# Patient Record
Sex: MALE | Race: WHITE | NOT HISPANIC OR LATINO
[De-identification: names, ages, dates, MRNs, and addresses within clinical notes are randomized per-mention and may not be internally consistent; named-entity substitution may affect disease eponyms.]

---

## 2017-01-04 ENCOUNTER — CHART COPY (OUTPATIENT)
Age: 67
End: 2017-01-04

## 2017-01-08 ENCOUNTER — FORM ENCOUNTER (OUTPATIENT)
Age: 67
End: 2017-01-08

## 2017-01-09 ENCOUNTER — OUTPATIENT (OUTPATIENT)
Dept: OUTPATIENT SERVICES | Facility: HOSPITAL | Age: 67
LOS: 1 days | End: 2017-01-09
Payer: MEDICARE

## 2017-01-09 ENCOUNTER — APPOINTMENT (OUTPATIENT)
Dept: CT IMAGING | Facility: HOSPITAL | Age: 67
End: 2017-01-09

## 2017-01-09 DIAGNOSIS — R91.1 SOLITARY PULMONARY NODULE: ICD-10-CM

## 2017-01-09 DIAGNOSIS — C18.9 MALIGNANT NEOPLASM OF COLON, UNSPECIFIED: ICD-10-CM

## 2017-01-09 DIAGNOSIS — C18.9 MALIGNANT NEOPLASM OF COLON, UNSPECIFIED: Chronic | ICD-10-CM

## 2017-01-09 DIAGNOSIS — Z98.89 OTHER SPECIFIED POSTPROCEDURAL STATES: Chronic | ICD-10-CM

## 2017-01-09 DIAGNOSIS — Z90.49 ACQUIRED ABSENCE OF OTHER SPECIFIED PARTS OF DIGESTIVE TRACT: Chronic | ICD-10-CM

## 2017-01-09 PROCEDURE — 71260 CT THORAX DX C+: CPT

## 2017-01-18 ENCOUNTER — APPOINTMENT (OUTPATIENT)
Dept: THORACIC SURGERY | Facility: CLINIC | Age: 67
End: 2017-01-18

## 2017-01-19 ENCOUNTER — OUTPATIENT (OUTPATIENT)
Dept: OUTPATIENT SERVICES | Facility: HOSPITAL | Age: 67
LOS: 1 days | End: 2017-01-19
Payer: MEDICARE

## 2017-01-19 ENCOUNTER — APPOINTMENT (OUTPATIENT)
Dept: NUCLEAR MEDICINE | Facility: CLINIC | Age: 67
End: 2017-01-19

## 2017-01-19 DIAGNOSIS — Z90.49 ACQUIRED ABSENCE OF OTHER SPECIFIED PARTS OF DIGESTIVE TRACT: Chronic | ICD-10-CM

## 2017-01-19 DIAGNOSIS — Z98.89 OTHER SPECIFIED POSTPROCEDURAL STATES: Chronic | ICD-10-CM

## 2017-01-19 DIAGNOSIS — Z00.8 ENCOUNTER FOR OTHER GENERAL EXAMINATION: ICD-10-CM

## 2017-01-19 DIAGNOSIS — C18.9 MALIGNANT NEOPLASM OF COLON, UNSPECIFIED: Chronic | ICD-10-CM

## 2017-01-19 PROCEDURE — 78815 PET IMAGE W/CT SKULL-THIGH: CPT | Mod: 26,PI

## 2017-01-19 PROCEDURE — 78815 PET IMAGE W/CT SKULL-THIGH: CPT

## 2017-01-19 PROCEDURE — A9552: CPT

## 2017-01-30 ENCOUNTER — APPOINTMENT (OUTPATIENT)
Dept: CARDIOLOGY | Facility: CLINIC | Age: 67
End: 2017-01-30

## 2017-02-06 ENCOUNTER — APPOINTMENT (OUTPATIENT)
Dept: CARDIOLOGY | Facility: CLINIC | Age: 67
End: 2017-02-06

## 2017-02-06 VITALS
SYSTOLIC BLOOD PRESSURE: 180 MMHG | BODY MASS INDEX: 22.33 KG/M2 | RESPIRATION RATE: 17 BRPM | HEIGHT: 65 IN | WEIGHT: 134 LBS | HEART RATE: 66 BPM | OXYGEN SATURATION: 95 % | DIASTOLIC BLOOD PRESSURE: 73 MMHG

## 2017-02-08 ENCOUNTER — APPOINTMENT (OUTPATIENT)
Dept: CARDIOLOGY | Facility: CLINIC | Age: 67
End: 2017-02-08

## 2017-03-06 ENCOUNTER — APPOINTMENT (OUTPATIENT)
Dept: HEMATOLOGY ONCOLOGY | Facility: CLINIC | Age: 67
End: 2017-03-06

## 2017-03-06 ENCOUNTER — OUTPATIENT (OUTPATIENT)
Dept: OUTPATIENT SERVICES | Facility: HOSPITAL | Age: 67
LOS: 1 days | End: 2017-03-06
Payer: MEDICARE

## 2017-03-06 ENCOUNTER — APPOINTMENT (OUTPATIENT)
Age: 67
End: 2017-03-06

## 2017-03-06 VITALS
BODY MASS INDEX: 22.8 KG/M2 | SYSTOLIC BLOOD PRESSURE: 124 MMHG | OXYGEN SATURATION: 97 % | WEIGHT: 137 LBS | HEART RATE: 58 BPM | TEMPERATURE: 97.5 F | DIASTOLIC BLOOD PRESSURE: 54 MMHG

## 2017-03-06 DIAGNOSIS — Z98.89 OTHER SPECIFIED POSTPROCEDURAL STATES: Chronic | ICD-10-CM

## 2017-03-06 DIAGNOSIS — Z90.49 ACQUIRED ABSENCE OF OTHER SPECIFIED PARTS OF DIGESTIVE TRACT: Chronic | ICD-10-CM

## 2017-03-06 DIAGNOSIS — C18.9 MALIGNANT NEOPLASM OF COLON, UNSPECIFIED: Chronic | ICD-10-CM

## 2017-03-06 DIAGNOSIS — C34.90 MALIGNANT NEOPLASM OF UNSPECIFIED PART OF UNSPECIFIED BRONCHUS OR LUNG: ICD-10-CM

## 2017-03-06 PROCEDURE — 96523 IRRIG DRUG DELIVERY DEVICE: CPT

## 2017-03-06 RX ORDER — ACETAMINOPHEN AND CODEINE 300; 30 MG/1; MG/1
300-30 TABLET ORAL
Qty: 30 | Refills: 0 | Status: COMPLETED | COMMUNITY
Start: 2017-02-14

## 2017-03-06 RX ORDER — PRAMOXINE HYDROCHLORIDE AND HYDROCORTISONE ACETATE 10; 25 MG/G; MG/G
2.5-1 CREAM TOPICAL
Qty: 30 | Refills: 0 | Status: COMPLETED | COMMUNITY
Start: 2017-01-13

## 2017-04-05 ENCOUNTER — RX RENEWAL (OUTPATIENT)
Age: 67
End: 2017-04-05

## 2017-04-12 ENCOUNTER — APPOINTMENT (OUTPATIENT)
Dept: MRI IMAGING | Facility: HOSPITAL | Age: 67
End: 2017-04-12

## 2017-04-12 ENCOUNTER — OUTPATIENT (OUTPATIENT)
Dept: OUTPATIENT SERVICES | Facility: HOSPITAL | Age: 67
LOS: 1 days | End: 2017-04-12
Payer: MEDICARE

## 2017-04-12 DIAGNOSIS — C18.9 MALIGNANT NEOPLASM OF COLON, UNSPECIFIED: Chronic | ICD-10-CM

## 2017-04-12 DIAGNOSIS — M51.26 OTHER INTERVERTEBRAL DISC DISPLACEMENT, LUMBAR REGION: ICD-10-CM

## 2017-04-12 DIAGNOSIS — Z90.49 ACQUIRED ABSENCE OF OTHER SPECIFIED PARTS OF DIGESTIVE TRACT: Chronic | ICD-10-CM

## 2017-04-12 DIAGNOSIS — Z98.89 OTHER SPECIFIED POSTPROCEDURAL STATES: Chronic | ICD-10-CM

## 2017-04-12 DIAGNOSIS — M51.37 OTHER INTERVERTEBRAL DISC DEGENERATION, LUMBOSACRAL REGION: ICD-10-CM

## 2017-04-12 PROCEDURE — 72158 MRI LUMBAR SPINE W/O & W/DYE: CPT

## 2017-05-13 ENCOUNTER — LABORATORY RESULT (OUTPATIENT)
Age: 67
End: 2017-05-13

## 2017-05-15 ENCOUNTER — APPOINTMENT (OUTPATIENT)
Dept: INFUSION THERAPY | Facility: HOSPITAL | Age: 67
End: 2017-05-15

## 2017-05-15 ENCOUNTER — OUTPATIENT (OUTPATIENT)
Dept: OUTPATIENT SERVICES | Facility: HOSPITAL | Age: 67
LOS: 1 days | End: 2017-05-15
Payer: MEDICARE

## 2017-05-15 ENCOUNTER — APPOINTMENT (OUTPATIENT)
Dept: HEMATOLOGY ONCOLOGY | Facility: CLINIC | Age: 67
End: 2017-05-15

## 2017-05-15 VITALS
DIASTOLIC BLOOD PRESSURE: 60 MMHG | BODY MASS INDEX: 22.99 KG/M2 | HEART RATE: 60 BPM | HEIGHT: 65 IN | WEIGHT: 138 LBS | RESPIRATION RATE: 16 BRPM | TEMPERATURE: 98.2 F | SYSTOLIC BLOOD PRESSURE: 148 MMHG

## 2017-05-15 DIAGNOSIS — C34.90 MALIGNANT NEOPLASM OF UNSPECIFIED PART OF UNSPECIFIED BRONCHUS OR LUNG: ICD-10-CM

## 2017-05-15 DIAGNOSIS — Z90.49 ACQUIRED ABSENCE OF OTHER SPECIFIED PARTS OF DIGESTIVE TRACT: Chronic | ICD-10-CM

## 2017-05-15 DIAGNOSIS — Z98.89 OTHER SPECIFIED POSTPROCEDURAL STATES: Chronic | ICD-10-CM

## 2017-05-15 DIAGNOSIS — C18.9 MALIGNANT NEOPLASM OF COLON, UNSPECIFIED: Chronic | ICD-10-CM

## 2017-05-15 PROCEDURE — 96523 IRRIG DRUG DELIVERY DEVICE: CPT

## 2017-06-01 ENCOUNTER — FORM ENCOUNTER (OUTPATIENT)
Age: 67
End: 2017-06-01

## 2017-06-02 ENCOUNTER — APPOINTMENT (OUTPATIENT)
Dept: CT IMAGING | Facility: HOSPITAL | Age: 67
End: 2017-06-02

## 2017-06-02 ENCOUNTER — APPOINTMENT (OUTPATIENT)
Dept: ULTRASOUND IMAGING | Facility: HOSPITAL | Age: 67
End: 2017-06-02

## 2017-06-02 ENCOUNTER — OUTPATIENT (OUTPATIENT)
Dept: OUTPATIENT SERVICES | Facility: HOSPITAL | Age: 67
LOS: 1 days | End: 2017-06-02
Payer: MEDICARE

## 2017-06-02 DIAGNOSIS — Z98.89 OTHER SPECIFIED POSTPROCEDURAL STATES: Chronic | ICD-10-CM

## 2017-06-02 DIAGNOSIS — C18.9 MALIGNANT NEOPLASM OF COLON, UNSPECIFIED: Chronic | ICD-10-CM

## 2017-06-02 DIAGNOSIS — K76.9 LIVER DISEASE, UNSPECIFIED: ICD-10-CM

## 2017-06-02 DIAGNOSIS — Z90.49 ACQUIRED ABSENCE OF OTHER SPECIFIED PARTS OF DIGESTIVE TRACT: Chronic | ICD-10-CM

## 2017-06-02 PROCEDURE — 71260 CT THORAX DX C+: CPT

## 2017-06-02 PROCEDURE — 76700 US EXAM ABDOM COMPLETE: CPT

## 2017-06-27 ENCOUNTER — OUTPATIENT (OUTPATIENT)
Dept: OUTPATIENT SERVICES | Facility: HOSPITAL | Age: 67
LOS: 1 days | End: 2017-06-27
Payer: MEDICARE

## 2017-06-27 ENCOUNTER — APPOINTMENT (OUTPATIENT)
Dept: INFUSION THERAPY | Facility: HOSPITAL | Age: 67
End: 2017-06-27

## 2017-06-27 ENCOUNTER — APPOINTMENT (OUTPATIENT)
Dept: HEMATOLOGY ONCOLOGY | Facility: CLINIC | Age: 67
End: 2017-06-27

## 2017-06-27 VITALS
DIASTOLIC BLOOD PRESSURE: 60 MMHG | TEMPERATURE: 98.4 F | HEART RATE: 68 BPM | BODY MASS INDEX: 22.8 KG/M2 | WEIGHT: 137 LBS | SYSTOLIC BLOOD PRESSURE: 122 MMHG

## 2017-06-27 DIAGNOSIS — C34.90 MALIGNANT NEOPLASM OF UNSPECIFIED PART OF UNSPECIFIED BRONCHUS OR LUNG: ICD-10-CM

## 2017-06-27 DIAGNOSIS — E55.9 VITAMIN D DEFICIENCY, UNSPECIFIED: ICD-10-CM

## 2017-06-27 DIAGNOSIS — C18.9 MALIGNANT NEOPLASM OF COLON, UNSPECIFIED: Chronic | ICD-10-CM

## 2017-06-27 DIAGNOSIS — Z98.89 OTHER SPECIFIED POSTPROCEDURAL STATES: Chronic | ICD-10-CM

## 2017-06-27 DIAGNOSIS — Z90.49 ACQUIRED ABSENCE OF OTHER SPECIFIED PARTS OF DIGESTIVE TRACT: Chronic | ICD-10-CM

## 2017-06-27 PROCEDURE — 96523 IRRIG DRUG DELIVERY DEVICE: CPT

## 2017-09-08 ENCOUNTER — OUTPATIENT (OUTPATIENT)
Dept: OUTPATIENT SERVICES | Facility: HOSPITAL | Age: 67
LOS: 1 days | End: 2017-09-08
Payer: MEDICARE

## 2017-09-08 ENCOUNTER — APPOINTMENT (OUTPATIENT)
Dept: INFUSION THERAPY | Facility: HOSPITAL | Age: 67
End: 2017-09-08

## 2017-09-08 DIAGNOSIS — Z90.49 ACQUIRED ABSENCE OF OTHER SPECIFIED PARTS OF DIGESTIVE TRACT: Chronic | ICD-10-CM

## 2017-09-08 DIAGNOSIS — Z98.89 OTHER SPECIFIED POSTPROCEDURAL STATES: Chronic | ICD-10-CM

## 2017-09-08 DIAGNOSIS — C34.90 MALIGNANT NEOPLASM OF UNSPECIFIED PART OF UNSPECIFIED BRONCHUS OR LUNG: ICD-10-CM

## 2017-09-08 DIAGNOSIS — C18.9 MALIGNANT NEOPLASM OF COLON, UNSPECIFIED: Chronic | ICD-10-CM

## 2017-09-08 PROCEDURE — 96523 IRRIG DRUG DELIVERY DEVICE: CPT

## 2017-10-07 ENCOUNTER — LABORATORY RESULT (OUTPATIENT)
Age: 67
End: 2017-10-07

## 2017-10-27 ENCOUNTER — APPOINTMENT (OUTPATIENT)
Dept: INFUSION THERAPY | Facility: HOSPITAL | Age: 67
End: 2017-10-27

## 2017-10-27 ENCOUNTER — OUTPATIENT (OUTPATIENT)
Dept: OUTPATIENT SERVICES | Facility: HOSPITAL | Age: 67
LOS: 1 days | End: 2017-10-27
Payer: MEDICARE

## 2017-10-27 DIAGNOSIS — Z98.89 OTHER SPECIFIED POSTPROCEDURAL STATES: Chronic | ICD-10-CM

## 2017-10-27 DIAGNOSIS — C18.9 MALIGNANT NEOPLASM OF COLON, UNSPECIFIED: Chronic | ICD-10-CM

## 2017-10-27 DIAGNOSIS — C34.90 MALIGNANT NEOPLASM OF UNSPECIFIED PART OF UNSPECIFIED BRONCHUS OR LUNG: ICD-10-CM

## 2017-10-27 DIAGNOSIS — Z90.49 ACQUIRED ABSENCE OF OTHER SPECIFIED PARTS OF DIGESTIVE TRACT: Chronic | ICD-10-CM

## 2017-11-09 ENCOUNTER — APPOINTMENT (OUTPATIENT)
Dept: HEMATOLOGY ONCOLOGY | Facility: CLINIC | Age: 67
End: 2017-11-09
Payer: MEDICARE

## 2017-11-09 VITALS
WEIGHT: 140 LBS | SYSTOLIC BLOOD PRESSURE: 140 MMHG | BODY MASS INDEX: 23.3 KG/M2 | HEART RATE: 68 BPM | DIASTOLIC BLOOD PRESSURE: 60 MMHG | TEMPERATURE: 97.8 F

## 2017-11-09 PROCEDURE — 96523 IRRIG DRUG DELIVERY DEVICE: CPT

## 2017-11-09 PROCEDURE — 99215 OFFICE O/P EST HI 40 MIN: CPT

## 2018-01-03 ENCOUNTER — APPOINTMENT (OUTPATIENT)
Dept: FAMILY MEDICINE | Facility: CLINIC | Age: 68
End: 2018-01-03
Payer: MEDICARE

## 2018-01-03 VITALS
DIASTOLIC BLOOD PRESSURE: 68 MMHG | WEIGHT: 138 LBS | SYSTOLIC BLOOD PRESSURE: 170 MMHG | OXYGEN SATURATION: 98 % | HEART RATE: 75 BPM | HEIGHT: 64 IN | BODY MASS INDEX: 23.56 KG/M2

## 2018-01-03 VITALS — SYSTOLIC BLOOD PRESSURE: 140 MMHG | DIASTOLIC BLOOD PRESSURE: 70 MMHG

## 2018-01-03 VITALS — RESPIRATION RATE: 16 BRPM

## 2018-01-03 PROCEDURE — 99214 OFFICE O/P EST MOD 30 MIN: CPT | Mod: 25

## 2018-01-03 PROCEDURE — 36415 COLL VENOUS BLD VENIPUNCTURE: CPT

## 2018-01-04 ENCOUNTER — APPOINTMENT (OUTPATIENT)
Dept: INFUSION THERAPY | Facility: HOSPITAL | Age: 68
End: 2018-01-04

## 2018-01-04 LAB
24R-OH-CALCIDIOL SERPL-MCNC: 62.3 PG/ML
25(OH)D3 SERPL-MCNC: 33.2 NG/ML
ALBUMIN SERPL ELPH-MCNC: 4.4 G/DL
ALP BLD-CCNC: 76 U/L
ALT SERPL-CCNC: 21 U/L
ANION GAP SERPL CALC-SCNC: 16 MMOL/L
AST SERPL-CCNC: 26 U/L
BASOPHILS # BLD AUTO: 0.01 K/UL
BASOPHILS NFR BLD AUTO: 0.1 %
BILIRUB SERPL-MCNC: 0.3 MG/DL
BUN SERPL-MCNC: 19 MG/DL
CALCIUM SERPL-MCNC: 9.7 MG/DL
CEA SERPL-MCNC: 1.4 NG/ML
CHLORIDE SERPL-SCNC: 103 MMOL/L
CHOLEST SERPL-MCNC: 154 MG/DL
CHOLEST/HDLC SERPL: 2.2 RATIO
CO2 SERPL-SCNC: 24 MMOL/L
CREAT SERPL-MCNC: 0.77 MG/DL
EOSINOPHIL # BLD AUTO: 0.26 K/UL
EOSINOPHIL NFR BLD AUTO: 3.3
GLUCOSE SERPL-MCNC: 85 MG/DL
HBA1C MFR BLD HPLC: 5.6 %
HCT VFR BLD CALC: 42.9 %
HDLC SERPL-MCNC: 71 MG/DL
HGB BLD-MCNC: 13.9 G/DL
IMM GRANULOCYTES NFR BLD AUTO: 0.3 %
LDLC SERPL CALC-MCNC: 61 MG/DL
LYMPHOCYTES # BLD AUTO: 2.22 K/UL
LYMPHOCYTES NFR BLD AUTO: 28.2 %
MAN DIFF?: NORMAL
MCHC RBC-ENTMCNC: 31.7 PG
MCHC RBC-ENTMCNC: 32.4 GM/DL
MCV RBC AUTO: 97.9 FL
MONOCYTES # BLD AUTO: 0.83 K/UL
MONOCYTES NFR BLD AUTO: 10.5 %
NEUTROPHILS # BLD AUTO: 4.54 K/UL
NEUTROPHILS NFR BLD AUTO: 57.6 %
PLATELET # BLD AUTO: 209 K/UL
POTASSIUM SERPL-SCNC: 4.1 MMOL/L
PROT SERPL-MCNC: 7.5 G/DL
PSA SERPL-MCNC: 0.34 NG/ML
RBC # BLD: 4.38 M/UL
RBC # FLD: 13.5 %
SODIUM SERPL-SCNC: 143 MMOL/L
TRIGL SERPL-MCNC: 111 MG/DL
WBC # FLD AUTO: 7.88 K/UL

## 2018-01-09 ENCOUNTER — OUTPATIENT (OUTPATIENT)
Dept: OUTPATIENT SERVICES | Facility: HOSPITAL | Age: 68
LOS: 1 days | End: 2018-01-09
Payer: MEDICARE

## 2018-01-09 ENCOUNTER — APPOINTMENT (OUTPATIENT)
Dept: INFUSION THERAPY | Facility: HOSPITAL | Age: 68
End: 2018-01-09

## 2018-01-09 DIAGNOSIS — C18.9 MALIGNANT NEOPLASM OF COLON, UNSPECIFIED: Chronic | ICD-10-CM

## 2018-01-09 DIAGNOSIS — C34.90 MALIGNANT NEOPLASM OF UNSPECIFIED PART OF UNSPECIFIED BRONCHUS OR LUNG: ICD-10-CM

## 2018-01-09 DIAGNOSIS — Z90.49 ACQUIRED ABSENCE OF OTHER SPECIFIED PARTS OF DIGESTIVE TRACT: Chronic | ICD-10-CM

## 2018-01-09 DIAGNOSIS — Z98.89 OTHER SPECIFIED POSTPROCEDURAL STATES: Chronic | ICD-10-CM

## 2018-01-09 PROCEDURE — 96523 IRRIG DRUG DELIVERY DEVICE: CPT

## 2018-02-03 ENCOUNTER — LABORATORY RESULT (OUTPATIENT)
Age: 68
End: 2018-02-03

## 2018-02-05 ENCOUNTER — APPOINTMENT (OUTPATIENT)
Dept: FAMILY MEDICINE | Facility: CLINIC | Age: 68
End: 2018-02-05
Payer: MEDICARE

## 2018-02-05 VITALS
SYSTOLIC BLOOD PRESSURE: 152 MMHG | HEIGHT: 64 IN | BODY MASS INDEX: 23.39 KG/M2 | OXYGEN SATURATION: 98 % | HEART RATE: 64 BPM | DIASTOLIC BLOOD PRESSURE: 70 MMHG | WEIGHT: 137 LBS

## 2018-02-05 VITALS — RESPIRATION RATE: 14 BRPM

## 2018-02-05 PROCEDURE — 99213 OFFICE O/P EST LOW 20 MIN: CPT

## 2018-02-13 ENCOUNTER — OUTPATIENT (OUTPATIENT)
Dept: OUTPATIENT SERVICES | Facility: HOSPITAL | Age: 68
LOS: 1 days | End: 2018-02-13
Payer: MEDICARE

## 2018-02-13 DIAGNOSIS — C34.90 MALIGNANT NEOPLASM OF UNSPECIFIED PART OF UNSPECIFIED BRONCHUS OR LUNG: ICD-10-CM

## 2018-02-13 DIAGNOSIS — C18.9 MALIGNANT NEOPLASM OF COLON, UNSPECIFIED: Chronic | ICD-10-CM

## 2018-02-13 DIAGNOSIS — Z98.89 OTHER SPECIFIED POSTPROCEDURAL STATES: Chronic | ICD-10-CM

## 2018-02-13 DIAGNOSIS — Z90.49 ACQUIRED ABSENCE OF OTHER SPECIFIED PARTS OF DIGESTIVE TRACT: Chronic | ICD-10-CM

## 2018-02-14 ENCOUNTER — FORM ENCOUNTER (OUTPATIENT)
Age: 68
End: 2018-02-14

## 2018-02-15 ENCOUNTER — OUTPATIENT (OUTPATIENT)
Dept: OUTPATIENT SERVICES | Facility: HOSPITAL | Age: 68
LOS: 1 days | End: 2018-02-15
Payer: MEDICARE

## 2018-02-15 ENCOUNTER — APPOINTMENT (OUTPATIENT)
Dept: CT IMAGING | Facility: HOSPITAL | Age: 68
End: 2018-02-15
Payer: MEDICARE

## 2018-02-15 DIAGNOSIS — C18.9 MALIGNANT NEOPLASM OF COLON, UNSPECIFIED: Chronic | ICD-10-CM

## 2018-02-15 DIAGNOSIS — Z85.038 PERSONAL HISTORY OF OTHER MALIGNANT NEOPLASM OF LARGE INTESTINE: ICD-10-CM

## 2018-02-15 DIAGNOSIS — Z98.89 OTHER SPECIFIED POSTPROCEDURAL STATES: Chronic | ICD-10-CM

## 2018-02-15 DIAGNOSIS — C18.9 MALIGNANT NEOPLASM OF COLON, UNSPECIFIED: ICD-10-CM

## 2018-02-15 DIAGNOSIS — C78.00 SECONDARY MALIGNANT NEOPLASM OF UNSPECIFIED LUNG: ICD-10-CM

## 2018-02-15 DIAGNOSIS — Z90.49 ACQUIRED ABSENCE OF OTHER SPECIFIED PARTS OF DIGESTIVE TRACT: Chronic | ICD-10-CM

## 2018-02-15 DIAGNOSIS — C78.7 SECONDARY MALIGNANT NEOPLASM OF LIVER AND INTRAHEPATIC BILE DUCT: ICD-10-CM

## 2018-02-15 PROCEDURE — 74177 CT ABD & PELVIS W/CONTRAST: CPT

## 2018-02-15 PROCEDURE — 71260 CT THORAX DX C+: CPT

## 2018-02-15 PROCEDURE — 74177 CT ABD & PELVIS W/CONTRAST: CPT | Mod: 26

## 2018-02-15 PROCEDURE — 71260 CT THORAX DX C+: CPT | Mod: 26

## 2018-02-22 ENCOUNTER — APPOINTMENT (OUTPATIENT)
Dept: INFUSION THERAPY | Facility: HOSPITAL | Age: 68
End: 2018-02-22

## 2018-02-22 ENCOUNTER — APPOINTMENT (OUTPATIENT)
Dept: HEMATOLOGY ONCOLOGY | Facility: CLINIC | Age: 68
End: 2018-02-22
Payer: MEDICARE

## 2018-02-22 VITALS
DIASTOLIC BLOOD PRESSURE: 60 MMHG | TEMPERATURE: 98.1 F | RESPIRATION RATE: 14 BRPM | SYSTOLIC BLOOD PRESSURE: 146 MMHG | BODY MASS INDEX: 23.69 KG/M2 | OXYGEN SATURATION: 99 % | WEIGHT: 138 LBS | HEART RATE: 60 BPM

## 2018-02-22 PROCEDURE — 96523 IRRIG DRUG DELIVERY DEVICE: CPT

## 2018-02-22 PROCEDURE — 99215 OFFICE O/P EST HI 40 MIN: CPT

## 2018-03-02 ENCOUNTER — MEDICATION RENEWAL (OUTPATIENT)
Age: 68
End: 2018-03-02

## 2018-04-12 ENCOUNTER — APPOINTMENT (OUTPATIENT)
Dept: FAMILY MEDICINE | Facility: CLINIC | Age: 68
End: 2018-04-12
Payer: MEDICARE

## 2018-04-12 VITALS
HEART RATE: 68 BPM | DIASTOLIC BLOOD PRESSURE: 66 MMHG | WEIGHT: 138 LBS | BODY MASS INDEX: 23.56 KG/M2 | OXYGEN SATURATION: 98 % | SYSTOLIC BLOOD PRESSURE: 154 MMHG | HEIGHT: 64 IN

## 2018-04-12 VITALS — DIASTOLIC BLOOD PRESSURE: 70 MMHG | RESPIRATION RATE: 14 BRPM | SYSTOLIC BLOOD PRESSURE: 138 MMHG

## 2018-04-12 PROCEDURE — 99213 OFFICE O/P EST LOW 20 MIN: CPT

## 2018-04-26 ENCOUNTER — OUTPATIENT (OUTPATIENT)
Dept: OUTPATIENT SERVICES | Facility: HOSPITAL | Age: 68
LOS: 1 days | End: 2018-04-26
Payer: MEDICARE

## 2018-04-26 ENCOUNTER — APPOINTMENT (OUTPATIENT)
Dept: INFUSION THERAPY | Facility: HOSPITAL | Age: 68
End: 2018-04-26

## 2018-04-26 DIAGNOSIS — Z98.89 OTHER SPECIFIED POSTPROCEDURAL STATES: Chronic | ICD-10-CM

## 2018-04-26 DIAGNOSIS — C18.9 MALIGNANT NEOPLASM OF COLON, UNSPECIFIED: Chronic | ICD-10-CM

## 2018-04-26 DIAGNOSIS — C34.90 MALIGNANT NEOPLASM OF UNSPECIFIED PART OF UNSPECIFIED BRONCHUS OR LUNG: ICD-10-CM

## 2018-04-26 DIAGNOSIS — Z90.49 ACQUIRED ABSENCE OF OTHER SPECIFIED PARTS OF DIGESTIVE TRACT: Chronic | ICD-10-CM

## 2018-04-26 PROCEDURE — 96523 IRRIG DRUG DELIVERY DEVICE: CPT

## 2018-06-14 DIAGNOSIS — C34.90 MALIGNANT NEOPLASM OF UNSPECIFIED PART OF UNSPECIFIED BRONCHUS OR LUNG: ICD-10-CM

## 2018-07-11 ENCOUNTER — APPOINTMENT (OUTPATIENT)
Dept: INFUSION THERAPY | Facility: HOSPITAL | Age: 68
End: 2018-07-11

## 2018-07-11 ENCOUNTER — OUTPATIENT (OUTPATIENT)
Dept: OUTPATIENT SERVICES | Facility: HOSPITAL | Age: 68
LOS: 1 days | End: 2018-07-11
Payer: MEDICARE

## 2018-07-11 DIAGNOSIS — C18.9 MALIGNANT NEOPLASM OF COLON, UNSPECIFIED: Chronic | ICD-10-CM

## 2018-07-11 DIAGNOSIS — Z98.89 OTHER SPECIFIED POSTPROCEDURAL STATES: Chronic | ICD-10-CM

## 2018-07-11 DIAGNOSIS — C34.90 MALIGNANT NEOPLASM OF UNSPECIFIED PART OF UNSPECIFIED BRONCHUS OR LUNG: ICD-10-CM

## 2018-07-11 DIAGNOSIS — Z90.49 ACQUIRED ABSENCE OF OTHER SPECIFIED PARTS OF DIGESTIVE TRACT: Chronic | ICD-10-CM

## 2018-07-11 PROCEDURE — 96523 IRRIG DRUG DELIVERY DEVICE: CPT

## 2018-07-12 ENCOUNTER — APPOINTMENT (OUTPATIENT)
Dept: FAMILY MEDICINE | Facility: CLINIC | Age: 68
End: 2018-07-12

## 2018-07-12 ENCOUNTER — RX RENEWAL (OUTPATIENT)
Age: 68
End: 2018-07-12

## 2018-08-04 ENCOUNTER — LABORATORY RESULT (OUTPATIENT)
Age: 68
End: 2018-08-04

## 2018-08-12 ENCOUNTER — FORM ENCOUNTER (OUTPATIENT)
Age: 68
End: 2018-08-12

## 2018-08-13 ENCOUNTER — OUTPATIENT (OUTPATIENT)
Dept: OUTPATIENT SERVICES | Facility: HOSPITAL | Age: 68
LOS: 1 days | End: 2018-08-13
Payer: MEDICARE

## 2018-08-13 ENCOUNTER — APPOINTMENT (OUTPATIENT)
Dept: CT IMAGING | Facility: HOSPITAL | Age: 68
End: 2018-08-13
Payer: MEDICARE

## 2018-08-13 DIAGNOSIS — Z90.49 ACQUIRED ABSENCE OF OTHER SPECIFIED PARTS OF DIGESTIVE TRACT: Chronic | ICD-10-CM

## 2018-08-13 DIAGNOSIS — Z98.89 OTHER SPECIFIED POSTPROCEDURAL STATES: Chronic | ICD-10-CM

## 2018-08-13 DIAGNOSIS — C18.9 MALIGNANT NEOPLASM OF COLON, UNSPECIFIED: Chronic | ICD-10-CM

## 2018-08-13 DIAGNOSIS — C78.02 SECONDARY MALIGNANT NEOPLASM OF LEFT LUNG: ICD-10-CM

## 2018-08-13 PROCEDURE — 74177 CT ABD & PELVIS W/CONTRAST: CPT

## 2018-08-13 PROCEDURE — 74177 CT ABD & PELVIS W/CONTRAST: CPT | Mod: 26

## 2018-08-13 PROCEDURE — 71260 CT THORAX DX C+: CPT

## 2018-08-13 PROCEDURE — 71260 CT THORAX DX C+: CPT | Mod: 26

## 2018-08-20 ENCOUNTER — APPOINTMENT (OUTPATIENT)
Dept: HEMATOLOGY ONCOLOGY | Facility: CLINIC | Age: 68
End: 2018-08-20
Payer: MEDICARE

## 2018-08-20 ENCOUNTER — APPOINTMENT (OUTPATIENT)
Dept: INFUSION THERAPY | Facility: HOSPITAL | Age: 68
End: 2018-08-20

## 2018-08-20 ENCOUNTER — OUTPATIENT (OUTPATIENT)
Dept: OUTPATIENT SERVICES | Facility: HOSPITAL | Age: 68
LOS: 1 days | End: 2018-08-20
Payer: MEDICARE

## 2018-08-20 VITALS
HEART RATE: 80 BPM | DIASTOLIC BLOOD PRESSURE: 60 MMHG | SYSTOLIC BLOOD PRESSURE: 170 MMHG | BODY MASS INDEX: 23.86 KG/M2 | WEIGHT: 139 LBS | TEMPERATURE: 96.8 F

## 2018-08-20 DIAGNOSIS — C78.02 SECONDARY MALIGNANT NEOPLASM OF LEFT LUNG: ICD-10-CM

## 2018-08-20 DIAGNOSIS — Z98.89 OTHER SPECIFIED POSTPROCEDURAL STATES: Chronic | ICD-10-CM

## 2018-08-20 DIAGNOSIS — Z90.49 ACQUIRED ABSENCE OF OTHER SPECIFIED PARTS OF DIGESTIVE TRACT: Chronic | ICD-10-CM

## 2018-08-20 DIAGNOSIS — C18.9 MALIGNANT NEOPLASM OF COLON, UNSPECIFIED: Chronic | ICD-10-CM

## 2018-08-20 PROCEDURE — 99215 OFFICE O/P EST HI 40 MIN: CPT

## 2018-08-20 PROCEDURE — 96523 IRRIG DRUG DELIVERY DEVICE: CPT

## 2018-10-09 ENCOUNTER — MEDICATION RENEWAL (OUTPATIENT)
Age: 68
End: 2018-10-09

## 2018-10-15 ENCOUNTER — OUTPATIENT (OUTPATIENT)
Dept: OUTPATIENT SERVICES | Facility: HOSPITAL | Age: 68
LOS: 1 days | End: 2018-10-15
Payer: MEDICARE

## 2018-10-15 ENCOUNTER — APPOINTMENT (OUTPATIENT)
Dept: INFUSION THERAPY | Facility: HOSPITAL | Age: 68
End: 2018-10-15

## 2018-10-15 DIAGNOSIS — C78.02 SECONDARY MALIGNANT NEOPLASM OF LEFT LUNG: ICD-10-CM

## 2018-10-15 DIAGNOSIS — Z98.89 OTHER SPECIFIED POSTPROCEDURAL STATES: Chronic | ICD-10-CM

## 2018-10-15 DIAGNOSIS — C18.9 MALIGNANT NEOPLASM OF COLON, UNSPECIFIED: Chronic | ICD-10-CM

## 2018-10-15 DIAGNOSIS — Z90.49 ACQUIRED ABSENCE OF OTHER SPECIFIED PARTS OF DIGESTIVE TRACT: Chronic | ICD-10-CM

## 2018-10-15 PROCEDURE — 96523 IRRIG DRUG DELIVERY DEVICE: CPT

## 2018-10-15 RX ADMIN — Medication 500 UNIT(S): at 15:28

## 2018-11-09 ENCOUNTER — RX RENEWAL (OUTPATIENT)
Age: 68
End: 2018-11-09

## 2018-11-09 ENCOUNTER — APPOINTMENT (OUTPATIENT)
Dept: FAMILY MEDICINE | Facility: CLINIC | Age: 68
End: 2018-11-09
Payer: MEDICARE

## 2018-11-09 VITALS
BODY MASS INDEX: 23.73 KG/M2 | SYSTOLIC BLOOD PRESSURE: 150 MMHG | OXYGEN SATURATION: 98 % | HEIGHT: 64 IN | WEIGHT: 139 LBS | HEART RATE: 76 BPM | DIASTOLIC BLOOD PRESSURE: 70 MMHG | TEMPERATURE: 98.2 F | RESPIRATION RATE: 14 BRPM

## 2018-11-09 DIAGNOSIS — R01.1 CARDIAC MURMUR, UNSPECIFIED: ICD-10-CM

## 2018-11-09 PROCEDURE — 99214 OFFICE O/P EST MOD 30 MIN: CPT

## 2018-11-09 NOTE — PHYSICAL EXAM
[No Acute Distress] : no acute distress [Well Nourished] : well nourished [Well Developed] : well developed [Well-Appearing] : well-appearing [Normal Sclera/Conjunctiva] : normal sclera/conjunctiva [PERRL] : pupils equal round and reactive to light [EOMI] : extraocular movements intact [Normal Outer Ear/Nose] : the outer ears and nose were normal in appearance [Normal Oropharynx] : the oropharynx was normal [No JVD] : no jugular venous distention [Supple] : supple [No Lymphadenopathy] : no lymphadenopathy [Thyroid Normal, No Nodules] : the thyroid was normal and there were no nodules present [No Respiratory Distress] : no respiratory distress  [Clear to Auscultation] : lungs were clear to auscultation bilaterally [No Accessory Muscle Use] : no accessory muscle use [Normal Rate] : normal rate  [Regular Rhythm] : with a regular rhythm [Normal S1, S2] : normal S1 and S2 [No Carotid Bruits] : no carotid bruits [No Abdominal Bruit] : a ~M bruit was not heard ~T in the abdomen [No Varicosities] : no varicosities [Pedal Pulses Present] : the pedal pulses are present [No Edema] : there was no peripheral edema [No Extremity Clubbing/Cyanosis] : no extremity clubbing/cyanosis [No Palpable Aorta] : no palpable aorta [Soft] : abdomen soft [Non Tender] : non-tender [Non-distended] : non-distended [No Masses] : no abdominal mass palpated [No HSM] : no HSM [Normal Bowel Sounds] : normal bowel sounds [Normal Supraclavicular Nodes] : no supraclavicular lymphadenopathy [Normal Posterior Cervical Nodes] : no posterior cervical lymphadenopathy [Normal Anterior Cervical Nodes] : no anterior cervical lymphadenopathy [No CVA Tenderness] : no CVA  tenderness [No Spinal Tenderness] : no spinal tenderness [No Joint Swelling] : no joint swelling [Grossly Normal Strength/Tone] : grossly normal strength/tone [No Rash] : no rash [Normal Gait] : normal gait [Coordination Grossly Intact] : coordination grossly intact [No Focal Deficits] : no focal deficits [Deep Tendon Reflexes (DTR)] : deep tendon reflexes were 2+ and symmetric [Speech Grossly Normal] : speech grossly normal [Memory Grossly Normal] : memory grossly normal [Normal Affect] : the affect was normal [Alert and Oriented x3] : oriented to person, place, and time [Normal Mood] : the mood was normal [Normal Insight/Judgement] : insight and judgment were intact [Comprehensive Foot Exam Normal] : Right and left foot were examined and both feet are normal. No ulcers in either foot. Toes are normal and with full ROM.  Normal tactile sensation with monofilament testing throughout both feet [de-identified] : Bilateral carotid bruit [de-identified] : Systolic ejection murmur 2/6 radiating to the neck

## 2018-11-09 NOTE — HISTORY OF PRESENT ILLNESS
[FreeTextEntry1] : Please see history of present illness [de-identified] : This is a 68-year-old gentleman, who presents today for followup disease management. Patient's medical history is significant for adenocarcinoma of colon, metastatic, hypertension, history of CVA, hyperlipidemia. Patient states it is in his usual state of health. Presently, the patient denies any chest pain, shortness of breath, nausea, vomiting

## 2018-11-09 NOTE — HEALTH RISK ASSESSMENT
[No falls in past year] : Patient reported no falls in the past year [0] : 2) Feeling down, depressed, or hopeless: Not at all (0) [] : No [VLX1Hfanm] : 0

## 2018-11-09 NOTE — ASSESSMENT
[FreeTextEntry1] : Assessment and plan:\par \par 1. Hypertension at times difficult to control. I will continue present medical management without change.\par \par 2. Systolic ejection murmur 2/6 and bilateral carotid bruit. Most likely transmitted murmur for completeness sake. Detailed discussion with patient. I would prefer cardiology evaluation 2-D echo and carotid ultrasound.\par \par 3. History of adeno CA of colon recommend followup colonoscopy. Referral given at\par \par 4. Influenza vaccine offered patient declined

## 2018-11-19 ENCOUNTER — APPOINTMENT (OUTPATIENT)
Dept: INFUSION THERAPY | Facility: HOSPITAL | Age: 68
End: 2018-11-19

## 2018-11-19 ENCOUNTER — OUTPATIENT (OUTPATIENT)
Dept: OUTPATIENT SERVICES | Facility: HOSPITAL | Age: 68
LOS: 1 days | End: 2018-11-19
Payer: MEDICARE

## 2018-11-19 DIAGNOSIS — Z90.49 ACQUIRED ABSENCE OF OTHER SPECIFIED PARTS OF DIGESTIVE TRACT: Chronic | ICD-10-CM

## 2018-11-19 DIAGNOSIS — C78.02 SECONDARY MALIGNANT NEOPLASM OF LEFT LUNG: ICD-10-CM

## 2018-11-19 DIAGNOSIS — Z98.89 OTHER SPECIFIED POSTPROCEDURAL STATES: Chronic | ICD-10-CM

## 2018-11-19 DIAGNOSIS — C18.9 MALIGNANT NEOPLASM OF COLON, UNSPECIFIED: Chronic | ICD-10-CM

## 2018-11-19 PROCEDURE — 96523 IRRIG DRUG DELIVERY DEVICE: CPT

## 2018-11-19 RX ADMIN — Medication 500 UNIT(S): at 15:07

## 2018-12-07 ENCOUNTER — NON-APPOINTMENT (OUTPATIENT)
Age: 68
End: 2018-12-07

## 2018-12-07 ENCOUNTER — APPOINTMENT (OUTPATIENT)
Dept: CARDIOLOGY | Facility: CLINIC | Age: 68
End: 2018-12-07
Payer: MEDICARE

## 2018-12-07 VITALS
WEIGHT: 137 LBS | HEART RATE: 63 BPM | SYSTOLIC BLOOD PRESSURE: 197 MMHG | OXYGEN SATURATION: 96 % | BODY MASS INDEX: 23.39 KG/M2 | DIASTOLIC BLOOD PRESSURE: 75 MMHG | TEMPERATURE: 97.9 F | HEIGHT: 64 IN

## 2018-12-07 VITALS — DIASTOLIC BLOOD PRESSURE: 70 MMHG | SYSTOLIC BLOOD PRESSURE: 180 MMHG

## 2018-12-07 PROCEDURE — 99215 OFFICE O/P EST HI 40 MIN: CPT

## 2018-12-07 PROCEDURE — 93000 ELECTROCARDIOGRAM COMPLETE: CPT

## 2018-12-07 NOTE — CARDIOLOGY SUMMARY
[No Ischemia] : no Ischemia [___] : [unfilled] [None] : normal LV function [Normal] : normal LA size [Moderate] : moderate mitral regurgitation

## 2018-12-07 NOTE — PHYSICAL EXAM
[General Appearance - Well Developed] : well developed [Normal Appearance] : normal appearance [Well Groomed] : well groomed [General Appearance - Well Nourished] : well nourished [No Deformities] : no deformities [General Appearance - In No Acute Distress] : no acute distress [Normal Conjunctiva] : the conjunctiva exhibited no abnormalities [Eyelids - No Xanthelasma] : the eyelids demonstrated no xanthelasmas [Normal Oral Mucosa] : normal oral mucosa [No Oral Pallor] : no oral pallor [No Oral Cyanosis] : no oral cyanosis [Normal Jugular Venous A Waves Present] : normal jugular venous A waves present [Normal Jugular Venous V Waves Present] : normal jugular venous V waves present [No Jugular Venous Rosado A Waves] : no jugular venous rosado A waves [Respiration, Rhythm And Depth] : normal respiratory rhythm and effort [Exaggerated Use Of Accessory Muscles For Inspiration] : no accessory muscle use [Auscultation Breath Sounds / Voice Sounds] : lungs were clear to auscultation bilaterally [Abdomen Soft] : soft [Abdomen Tenderness] : non-tender [Abdomen Mass (___ Cm)] : no abdominal mass palpated [Abnormal Walk] : normal gait [Gait - Sufficient For Exercise Testing] : the gait was sufficient for exercise testing [Nail Clubbing] : no clubbing of the fingernails [Cyanosis, Localized] : no localized cyanosis [Petechial Hemorrhages (___cm)] : no petechial hemorrhages [Skin Color & Pigmentation] : normal skin color and pigmentation [] : no rash [No Venous Stasis] : no venous stasis [Skin Lesions] : no skin lesions [No Skin Ulcers] : no skin ulcer [No Xanthoma] : no  xanthoma was observed [Oriented To Time, Place, And Person] : oriented to person, place, and time [Affect] : the affect was normal [Mood] : the mood was normal [No Anxiety] : not feeling anxious [Normal Rate] : normal [Normal S1] : normal S1 [Normal S2] : normal S2 [S3] : no S3 [S4] : no S4 [No Murmur] : no murmurs heard [II] : a grade 2 [Right Carotid Bruit] : right carotid bruit heard [Left Carotid Bruit] : left carotid bruit heard [Right Femoral Bruit] : no bruit heard over the right femoral artery [Left Femoral Bruit] : no bruit heard over the left femoral artery [2+] : left 2+ [No Abnormalities] : the abdominal aorta was not enlarged and no bruit was heard [No Pitting Edema] : no pitting edema present

## 2018-12-07 NOTE — REASON FOR VISIT
[Follow-Up - Clinic] : a clinic follow-up of [FreeTextEntry2] : pt last seen 2/17, now for f/u [FreeTextEntry1] : denies sscp,sob, palps or dizziness

## 2018-12-07 NOTE — DISCUSSION/SUMMARY
[Hypertension] : hypertension [Not Responding to Treatment] : not responding to treatment [Outpatient Evaluation] : outpatient evaluation [Ambulatory BP Monitoring] : ambulatory blood pressure monitoring [Medication Changes Per Orders] : as documented in orders [Echocardiogram] : echocardiogram [de-identified] : resume norvasc 5 [de-identified] : s/p left CEA, has bruits, has not followed with vascular [de-identified] : carotid dopplers

## 2018-12-18 ENCOUNTER — APPOINTMENT (OUTPATIENT)
Dept: CARDIOLOGY | Facility: CLINIC | Age: 68
End: 2018-12-18
Payer: MEDICARE

## 2018-12-18 PROCEDURE — 93880 EXTRACRANIAL BILAT STUDY: CPT

## 2018-12-19 ENCOUNTER — APPOINTMENT (OUTPATIENT)
Dept: CARDIOLOGY | Facility: CLINIC | Age: 68
End: 2018-12-19
Payer: MEDICARE

## 2018-12-19 VITALS
WEIGHT: 137 LBS | RESPIRATION RATE: 17 BRPM | SYSTOLIC BLOOD PRESSURE: 164 MMHG | HEART RATE: 58 BPM | OXYGEN SATURATION: 98 % | HEIGHT: 64 IN | BODY MASS INDEX: 23.39 KG/M2 | DIASTOLIC BLOOD PRESSURE: 76 MMHG

## 2018-12-19 PROCEDURE — 93306 TTE W/DOPPLER COMPLETE: CPT

## 2018-12-19 PROCEDURE — 99214 OFFICE O/P EST MOD 30 MIN: CPT

## 2018-12-20 NOTE — PHYSICAL EXAM
[General Appearance - Well Developed] : well developed [Normal Appearance] : normal appearance [Well Groomed] : well groomed [General Appearance - Well Nourished] : well nourished [No Deformities] : no deformities [General Appearance - In No Acute Distress] : no acute distress [Normal Conjunctiva] : the conjunctiva exhibited no abnormalities [Eyelids - No Xanthelasma] : the eyelids demonstrated no xanthelasmas [Normal Oral Mucosa] : normal oral mucosa [No Oral Pallor] : no oral pallor [No Oral Cyanosis] : no oral cyanosis [Normal Jugular Venous A Waves Present] : normal jugular venous A waves present [Normal Jugular Venous V Waves Present] : normal jugular venous V waves present [No Jugular Venous Rosado A Waves] : no jugular venous rosado A waves [Respiration, Rhythm And Depth] : normal respiratory rhythm and effort [Exaggerated Use Of Accessory Muscles For Inspiration] : no accessory muscle use [Auscultation Breath Sounds / Voice Sounds] : lungs were clear to auscultation bilaterally [Abdomen Soft] : soft [Abdomen Tenderness] : non-tender [Abdomen Mass (___ Cm)] : no abdominal mass palpated [Abnormal Walk] : normal gait [Gait - Sufficient For Exercise Testing] : the gait was sufficient for exercise testing [Nail Clubbing] : no clubbing of the fingernails [Cyanosis, Localized] : no localized cyanosis [Petechial Hemorrhages (___cm)] : no petechial hemorrhages [Skin Color & Pigmentation] : normal skin color and pigmentation [] : no rash [No Venous Stasis] : no venous stasis [Skin Lesions] : no skin lesions [No Skin Ulcers] : no skin ulcer [No Xanthoma] : no  xanthoma was observed [Oriented To Time, Place, And Person] : oriented to person, place, and time [Affect] : the affect was normal [Mood] : the mood was normal [No Anxiety] : not feeling anxious [Normal Rate] : normal [Normal S1] : normal S1 [Normal S2] : normal S2 [No Murmur] : no murmurs heard [II] : a grade 2 [Right Carotid Bruit] : right carotid bruit heard [Left Carotid Bruit] : left carotid bruit heard [2+] : left 2+ [No Abnormalities] : the abdominal aorta was not enlarged and no bruit was heard [No Pitting Edema] : no pitting edema present [S3] : no S3 [S4] : no S4 [Right Femoral Bruit] : no bruit heard over the right femoral artery [Left Femoral Bruit] : no bruit heard over the left femoral artery

## 2018-12-20 NOTE — DISCUSSION/SUMMARY
[Hypertension] : hypertension [Outpatient Evaluation] : outpatient evaluation [Ambulatory BP Monitoring] : ambulatory blood pressure monitoring [Medication Changes Per Orders] : as documented in orders [Echocardiogram] : echocardiogram [Responding to Treatment] : responding to treatment [de-identified] : inc norvasc 10 [de-identified] : s/p left CEA, has bruits, has not followed with vascular [de-identified] : carotid dopplers

## 2019-01-29 ENCOUNTER — APPOINTMENT (OUTPATIENT)
Age: 69
End: 2019-01-29
Payer: MEDICARE

## 2019-01-29 ENCOUNTER — LABORATORY RESULT (OUTPATIENT)
Age: 69
End: 2019-01-29

## 2019-01-29 VITALS — SYSTOLIC BLOOD PRESSURE: 173 MMHG | DIASTOLIC BLOOD PRESSURE: 68 MMHG | HEART RATE: 73 BPM | TEMPERATURE: 98.4 F

## 2019-01-29 DIAGNOSIS — Z87.898 PERSONAL HISTORY OF OTHER SPECIFIED CONDITIONS: ICD-10-CM

## 2019-01-29 DIAGNOSIS — R91.8 OTHER NONSPECIFIC ABNORMAL FINDING OF LUNG FIELD: ICD-10-CM

## 2019-01-29 DIAGNOSIS — Z87.09 PERSONAL HISTORY OF OTHER DISEASES OF THE RESPIRATORY SYSTEM: ICD-10-CM

## 2019-01-29 LAB
HCT VFR BLD CALC: 40.8 %
HGB BLD-MCNC: 13.5 G/DL
MCHC RBC-ENTMCNC: 32.1 PG
MCHC RBC-ENTMCNC: 32.9 GM/DL
MCV RBC AUTO: 97.5 FL
PLATELET # BLD AUTO: 164 K/UL
RBC # BLD: 4.19 M/UL
RBC # FLD: 12.5 %
WBC # FLD AUTO: 8.4 K/UL

## 2019-01-29 PROCEDURE — 96523 IRRIG DRUG DELIVERY DEVICE: CPT

## 2019-01-30 LAB
ALBUMIN SERPL ELPH-MCNC: 4.4 G/DL
ALP BLD-CCNC: 76 U/L
ALT SERPL-CCNC: 21 U/L
ANION GAP SERPL CALC-SCNC: 15 MMOL/L
AST SERPL-CCNC: 25 U/L
BILIRUB SERPL-MCNC: 0.4 MG/DL
BUN SERPL-MCNC: 17 MG/DL
CALCIUM SERPL-MCNC: 9.6 MG/DL
CEA SERPL-MCNC: 1.4 NG/ML
CHLORIDE SERPL-SCNC: 103 MMOL/L
CO2 SERPL-SCNC: 23 MMOL/L
CREAT SERPL-MCNC: 0.85 MG/DL
GLUCOSE SERPL-MCNC: 97 MG/DL
POTASSIUM SERPL-SCNC: 3.8 MMOL/L
PROT SERPL-MCNC: 7 G/DL
SODIUM SERPL-SCNC: 141 MMOL/L

## 2019-03-11 ENCOUNTER — APPOINTMENT (OUTPATIENT)
Dept: FAMILY MEDICINE | Facility: CLINIC | Age: 69
End: 2019-03-11
Payer: MEDICARE

## 2019-03-11 VITALS
RESPIRATION RATE: 16 BRPM | TEMPERATURE: 97.8 F | DIASTOLIC BLOOD PRESSURE: 70 MMHG | HEIGHT: 64 IN | OXYGEN SATURATION: 97 % | HEART RATE: 70 BPM | BODY MASS INDEX: 23.39 KG/M2 | WEIGHT: 137 LBS | SYSTOLIC BLOOD PRESSURE: 130 MMHG

## 2019-03-11 DIAGNOSIS — I65.29 OCCLUSION AND STENOSIS OF UNSPECIFIED CAROTID ARTERY: ICD-10-CM

## 2019-03-11 PROCEDURE — 99496 TRANSJ CARE MGMT HIGH F2F 7D: CPT

## 2019-03-11 RX ORDER — CYCLOBENZAPRINE HYDROCHLORIDE 5 MG/1
5 TABLET, FILM COATED ORAL
Qty: 30 | Refills: 3 | Status: ACTIVE | COMMUNITY
Start: 2019-03-11 | End: 1900-01-01

## 2019-03-11 NOTE — REVIEW OF SYSTEMS
[Joint Pain] : joint pain [Back Pain] : back pain [Negative] : Heme/Lymph [FreeTextEntry9] : shoulder pain

## 2019-03-11 NOTE — COUNSELING
[Behavioral health counseling provided] : behavioral health  [Fall prevention counseling provided] : fall prevention  [Sleep ___ hours/day] : Sleep [unfilled] hours/day [Engage in a relaxing activity] : Engage in a relaxing activity [Plan in advance] : Plan in advance [None] : None [Good understanding] : Patient has a good understanding of lifestyle changes and the steps needed to achieve self management goals

## 2019-03-11 NOTE — ASSESSMENT
[FreeTextEntry1] : Assessment and plan:\par \par Status post endarterectomy. Continue Plavix 75 mg p.o. daily. Hypertension is under good control. Continue present medications without change. Followup with cardiology and vascular surgery. Patient also has history of colon CA. Has been treated surgically and with chemotherapy. Patient still has port will be seeing hematology oncology for consultation regarding port removal

## 2019-03-11 NOTE — HISTORY OF PRESENT ILLNESS
[Post-hospitalization from ___ Hospital] : Post-hospitalization from [unfilled] Hospital [Admitted on: ___] : The patient was admitted on [unfilled] [Discharged on ___] : discharged on [unfilled] [Discharge Summary] : discharge summary [Pertinent Labs] : pertinent labs [Radiology Findings] : radiology findings [Discharge Med List] : discharge medication list [Med Reconciliation] : medication reconciliation has been completed [Patient Contacted By: ____] : and contacted by [unfilled] [FreeTextEntry2] : Patient is a 69-year-old gentleman, who presents today status post hospitalization at [facility]. Patient had an emergent admission subsequently required carotid endarterectomy, which was done on March 1, 2019 by Dr. Carcamo, patient subsequently discharged on March 2, 2019 oral medications remain the same except clopidogrel 75 mg daily was added, which the patient will be taken for the next 3 months.\par \par Patient is awake, alert, and oriented x3. He is in no acute distress. He is calm, cooperative, well-groomed, and nourished. Presently denies any chest pain, shortness of breath, nausea, vomiting, but does admit to residual right shoulder pain postop. Which may be secondary to position. The patient was placed and during surgery.

## 2019-03-11 NOTE — PHYSICAL EXAM
[No Acute Distress] : no acute distress [Well Nourished] : well nourished [Well Developed] : well developed [Well-Appearing] : well-appearing [Normal Sclera/Conjunctiva] : normal sclera/conjunctiva [PERRL] : pupils equal round and reactive to light [EOMI] : extraocular movements intact [Normal Outer Ear/Nose] : the outer ears and nose were normal in appearance [Normal Oropharynx] : the oropharynx was normal [No JVD] : no jugular venous distention [Supple] : supple [No Lymphadenopathy] : no lymphadenopathy [Thyroid Normal, No Nodules] : the thyroid was normal and there were no nodules present [No Respiratory Distress] : no respiratory distress  [Clear to Auscultation] : lungs were clear to auscultation bilaterally [No Accessory Muscle Use] : no accessory muscle use [Normal Rate] : normal rate  [Regular Rhythm] : with a regular rhythm [Normal S1, S2] : normal S1 and S2 [No Carotid Bruits] : no carotid bruits [No Abdominal Bruit] : a ~M bruit was not heard ~T in the abdomen [No Varicosities] : no varicosities [Pedal Pulses Present] : the pedal pulses are present [No Edema] : there was no peripheral edema [No Extremity Clubbing/Cyanosis] : no extremity clubbing/cyanosis [No Palpable Aorta] : no palpable aorta [Soft] : abdomen soft [Non Tender] : non-tender [Non-distended] : non-distended [No Masses] : no abdominal mass palpated [No HSM] : no HSM [Normal Bowel Sounds] : normal bowel sounds [Normal Supraclavicular Nodes] : no supraclavicular lymphadenopathy [Normal Posterior Cervical Nodes] : no posterior cervical lymphadenopathy [Normal Anterior Cervical Nodes] : no anterior cervical lymphadenopathy [No CVA Tenderness] : no CVA  tenderness [No Spinal Tenderness] : no spinal tenderness [No Joint Swelling] : no joint swelling [Grossly Normal Strength/Tone] : grossly normal strength/tone [No Rash] : no rash [Normal Gait] : normal gait [Coordination Grossly Intact] : coordination grossly intact [No Focal Deficits] : no focal deficits [Deep Tendon Reflexes (DTR)] : deep tendon reflexes were 2+ and symmetric [Speech Grossly Normal] : speech grossly normal [Memory Grossly Normal] : memory grossly normal [Normal Affect] : the affect was normal [Alert and Oriented x3] : oriented to person, place, and time [Normal Mood] : the mood was normal [Normal Insight/Judgement] : insight and judgment were intact [Comprehensive Foot Exam Normal] : Right and left foot were examined and both feet are normal. No ulcers in either foot. Toes are normal and with full ROM.  Normal tactile sensation with monofilament testing throughout both feet [de-identified] : Bilateral carotid ,Bruit,Status post right carotid endarterectomy healing well [de-identified] : Systolic ejection murmur 2/6 radiating to the neck [de-identified] : Right shoulder pain appears to be purely muscular

## 2019-03-11 NOTE — HEALTH RISK ASSESSMENT
[No falls in past year] : Patient reported no falls in the past year [0] : 2) Feeling down, depressed, or hopeless: Not at all (0) [HIV test declined] : HIV test declined [None] : None [With Significant Other] : lives with significant other [Employed] : employed [Less Than High School] : less than high school [] :  [Sexually Active] : sexually active [Feels Safe at Home] : Feels safe at home [Fully functional (bathing, dressing, toileting, transferring, walking, feeding)] : Fully functional (bathing, dressing, toileting, transferring, walking, feeding) [Fully functional (using the telephone, shopping, preparing meals, housekeeping, doing laundry, using] : Fully functional and needs no help or supervision to perform IADLs (using the telephone, shopping, preparing meals, housekeeping, doing laundry, using transportation, managing medications and managing finances) [Carbon Monoxide Detector] : carbon monoxide detector [Safety elements used in home] : safety elements used in home [Seat Belt] :  uses seat belt [Sunscreen] : uses sunscreen [Reviewed updated] : Reviewed updated [Designated Healthcare Proxy] : Designated healthcare proxy [Name: ___] : Health Care Proxy's Name: [unfilled]  [Relationship: ___] : Relationship: [unfilled] [Aggressive treatment] : aggressive treatment [I will adhere to the patient's wishes as expressed in the advance directive except as noted below.] : I will adhere to the patient's wishes as expressed in the advance directive except as noted below [] : No [de-identified] : Wine with meals [[id number]] : 0 [Change in mental status noted] : No change in mental status noted [Language] : denies difficulty with language [Behavior] : denies difficulty with behavior [Learning/Retaining New Information] : denies difficulty learning/retaining new information [Handling Complex Tasks] : denies difficulty handling complex tasks [Reasoning] : denies difficulty with reasoning [Spatial Ability and Orientation] : denies difficulty with spatial ability and orientation [High Risk Behavior] : no high risk behavior [Reports changes in hearing] : Reports no changes in hearing [Reports changes in vision] : Reports no changes in vision [Reports changes in dental health] : Reports no changes in dental health [Travel to Developing Areas] : does not  travel to developing areas [TB Exposure] : is not being exposed to tuberculosis [Caregiver Concerns] : does not have caregiver concerns [AdvancecareDate] : 03/19

## 2019-03-20 ENCOUNTER — RX CHANGE (OUTPATIENT)
Age: 69
End: 2019-03-20

## 2019-03-28 ENCOUNTER — APPOINTMENT (OUTPATIENT)
Age: 69
End: 2019-03-28
Payer: MEDICARE

## 2019-03-28 VITALS
BODY MASS INDEX: 23.56 KG/M2 | WEIGHT: 138 LBS | HEIGHT: 64 IN | DIASTOLIC BLOOD PRESSURE: 69 MMHG | TEMPERATURE: 98.1 F | HEART RATE: 86 BPM | SYSTOLIC BLOOD PRESSURE: 164 MMHG

## 2019-03-28 DIAGNOSIS — Z78.9 OTHER SPECIFIED HEALTH STATUS: ICD-10-CM

## 2019-03-28 PROCEDURE — 99214 OFFICE O/P EST MOD 30 MIN: CPT

## 2019-03-30 PROBLEM — Z78.9 CENTRAL VENOUS CATHETER IN PLACE: Status: ACTIVE | Noted: 2018-08-20

## 2019-03-30 NOTE — ASSESSMENT
[FreeTextEntry1] : Mr. MORILLO and his son 's questions were answered to their satisfaction. He  expressed his  understanding and willingness to comply with the above recommendations, and  will return to the office to review the results of the blood tests in 4 months.\par \par \par

## 2019-03-30 NOTE — PHYSICAL EXAM
[Restricted in physically strenuous activity but ambulatory and able to carry out work of a light or sedentary nature] : Status 1- Restricted in physically strenuous activity but ambulatory and able to carry out work of a light or sedentary nature, e.g., light house work, office work [Normal] : grossly intact [de-identified] : right side scar, s/p  right carotid thrombarterectomy

## 2019-03-30 NOTE — PHYSICAL EXAM
[Restricted in physically strenuous activity but ambulatory and able to carry out work of a light or sedentary nature] : Status 1- Restricted in physically strenuous activity but ambulatory and able to carry out work of a light or sedentary nature, e.g., light house work, office work [Normal] : grossly intact [de-identified] : right side scar, s/p  right carotid thrombarterectomy

## 2019-03-30 NOTE — REVIEW OF SYSTEMS
[Negative] : Heme/Lymph [FreeTextEntry9] : right arm pain radiating from neck, post endarterectomy procedure

## 2019-03-30 NOTE — HISTORY OF PRESENT ILLNESS
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 [de-identified] : Adenocarcinoma [de-identified] : CEA [FreeTextEntry1] : \par -status post left lung metastasectomy performed February 9, 2017\par -last chemotherapy with Capecitabine -  August 2015 [de-identified] : Patient last seen in office in August 2018; in interim, he continued to have [id number] flushed every 2 months. Last CT from August 2018 - showed no evidence of recurrent disease. In interim he followed up with cardiology, and had right carotid endarterectomy at [facility] ( Dr. Carcamo), and was started on Clopidogrel. He has recovered well post procedure, and has minimal discomfort at the site of incision. Remains active, and appears non-toxic. Appetite and weight preserved. Denies B symptoms. Off chemotherapy since August 2015.Accompanied by son, Wilberto.

## 2019-03-30 NOTE — HISTORY OF PRESENT ILLNESS
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 [de-identified] : Adenocarcinoma [de-identified] : CEA [FreeTextEntry1] : \par -status post left lung metastasectomy performed February 9, 2017\par -last chemotherapy with Capecitabine -  August 2015 [de-identified] : Patient last seen in office in August 2018; in interim, he continued to have [id number] flushed every 2 months. Last CT from August 2018 - showed no evidence of recurrent disease. In interim he followed up with cardiology, and had right carotid endarterectomy at [facility] ( Dr. Carcamo), and was started on Clopidogrel. He has recovered well post procedure, and has minimal discomfort at the site of incision. Remains active, and appears non-toxic. Appetite and weight preserved. Denies B symptoms. Off chemotherapy since August 2015.Accompanied by son, Wilberto.

## 2019-04-23 ENCOUNTER — RX RENEWAL (OUTPATIENT)
Age: 69
End: 2019-04-23

## 2019-05-02 ENCOUNTER — APPOINTMENT (OUTPATIENT)
Age: 69
End: 2019-05-02
Payer: MEDICARE

## 2019-05-02 VITALS
DIASTOLIC BLOOD PRESSURE: 64 MMHG | HEART RATE: 77 BPM | WEIGHT: 134 LBS | TEMPERATURE: 98 F | SYSTOLIC BLOOD PRESSURE: 115 MMHG | BODY MASS INDEX: 22.88 KG/M2 | HEIGHT: 64 IN

## 2019-05-02 PROCEDURE — 96523 IRRIG DRUG DELIVERY DEVICE: CPT

## 2019-05-23 ENCOUNTER — RX RENEWAL (OUTPATIENT)
Age: 69
End: 2019-05-23

## 2019-06-13 ENCOUNTER — APPOINTMENT (OUTPATIENT)
Age: 69
End: 2019-06-13
Payer: MEDICARE

## 2019-06-13 VITALS
SYSTOLIC BLOOD PRESSURE: 135 MMHG | HEART RATE: 67 BPM | DIASTOLIC BLOOD PRESSURE: 64 MMHG | RESPIRATION RATE: 16 BRPM | TEMPERATURE: 98 F

## 2019-06-13 PROCEDURE — 96523 IRRIG DRUG DELIVERY DEVICE: CPT

## 2019-06-20 ENCOUNTER — APPOINTMENT (OUTPATIENT)
Dept: FAMILY MEDICINE | Facility: CLINIC | Age: 69
End: 2019-06-20
Payer: MEDICARE

## 2019-06-20 VITALS
TEMPERATURE: 97.9 F | HEART RATE: 61 BPM | OXYGEN SATURATION: 97 % | SYSTOLIC BLOOD PRESSURE: 140 MMHG | DIASTOLIC BLOOD PRESSURE: 52 MMHG | WEIGHT: 130 LBS | BODY MASS INDEX: 22.2 KG/M2 | HEIGHT: 64 IN

## 2019-06-20 DIAGNOSIS — Z98.890 OTHER SPECIFIED POSTPROCEDURAL STATES: ICD-10-CM

## 2019-06-20 PROCEDURE — 36415 COLL VENOUS BLD VENIPUNCTURE: CPT

## 2019-06-20 PROCEDURE — 99214 OFFICE O/P EST MOD 30 MIN: CPT | Mod: 25

## 2019-06-20 NOTE — PHYSICAL EXAM
[Well Nourished] : well nourished [No Acute Distress] : no acute distress [Well Developed] : well developed [Well-Appearing] : well-appearing [Normal Sclera/Conjunctiva] : normal sclera/conjunctiva [PERRL] : pupils equal round and reactive to light [EOMI] : extraocular movements intact [Normal Outer Ear/Nose] : the outer ears and nose were normal in appearance [No JVD] : no jugular venous distention [Normal Oropharynx] : the oropharynx was normal [Supple] : supple [Thyroid Normal, No Nodules] : the thyroid was normal and there were no nodules present [No Lymphadenopathy] : no lymphadenopathy [No Respiratory Distress] : no respiratory distress  [Clear to Auscultation] : lungs were clear to auscultation bilaterally [No Accessory Muscle Use] : no accessory muscle use [Normal S1, S2] : normal S1 and S2 [Regular Rhythm] : with a regular rhythm [Normal Rate] : normal rate  [No Carotid Bruits] : no carotid bruits [No Abdominal Bruit] : a ~M bruit was not heard ~T in the abdomen [Pedal Pulses Present] : the pedal pulses are present [No Varicosities] : no varicosities [No Extremity Clubbing/Cyanosis] : no extremity clubbing/cyanosis [No Edema] : there was no peripheral edema [Soft] : abdomen soft [No Palpable Aorta] : no palpable aorta [Non Tender] : non-tender [Non-distended] : non-distended [No HSM] : no HSM [No Masses] : no abdominal mass palpated [Normal Bowel Sounds] : normal bowel sounds [Normal Supraclavicular Nodes] : no supraclavicular lymphadenopathy [Normal Posterior Cervical Nodes] : no posterior cervical lymphadenopathy [Normal Anterior Cervical Nodes] : no anterior cervical lymphadenopathy [No Joint Swelling] : no joint swelling [No Spinal Tenderness] : no spinal tenderness [No CVA Tenderness] : no CVA  tenderness [Grossly Normal Strength/Tone] : grossly normal strength/tone [Normal Gait] : normal gait [No Rash] : no rash [Coordination Grossly Intact] : coordination grossly intact [No Focal Deficits] : no focal deficits [Memory Grossly Normal] : memory grossly normal [Speech Grossly Normal] : speech grossly normal [Deep Tendon Reflexes (DTR)] : deep tendon reflexes were 2+ and symmetric [Normal Affect] : the affect was normal [Alert and Oriented x3] : oriented to person, place, and time [Normal Mood] : the mood was normal [Normal Insight/Judgement] : insight and judgment were intact [Comprehensive Foot Exam Normal] : Right and left foot were examined and both feet are normal. No ulcers in either foot. Toes are normal and with full ROM.  Normal tactile sensation with monofilament testing throughout both feet [de-identified] : Systolic ejection murmur 2/6 radiating to the neck [de-identified] : Bilateral carotid bruit

## 2019-06-20 NOTE — HISTORY OF PRESENT ILLNESS
[Spouse] : spouse [FreeTextEntry1] : Please see history of present illness [de-identified] : Patient is a 69-year-old gentleman accompanied by his wife, who presents for followup appointment. Patient states it is in his usual state of health. He admits to weight loss, but states that he is feeling well. Patient has been worked up for cancer because he does have a history of colon cancer status post resection status post chemotherapy in fact patient has had PET scan CAT scans and also followup colonoscopy. All of which report that he is doing well. Medical history is significant for ischemic heart disease, hypertension, hyperlipidemia, and status post carotid endarterectomy.\par \par Patient is awake, alert, and oriented x3 in no acute distress. He is calm and cooperative. He denies any chest pain, shortness of breath, nausea, or vomiting.

## 2019-06-20 NOTE — COUNSELING
[Behavioral health counseling provided] : behavioral health  [Fall prevention counseling provided] : fall prevention  [Sleep ___ hours/day] : Sleep [unfilled] hours/day [Engage in a relaxing activity] : Engage in a relaxing activity [Plan in advance] : Plan in advance [No throw rugs] : No throw rugs [Adequate lighting] : Adequate lighting [None] : None [Use proper foot wear] : Use proper foot wear [Good understanding] : Patient has a good understanding of lifestyle changes and the steps needed to achieve self management goals

## 2019-06-20 NOTE — HEALTH RISK ASSESSMENT
[Yes] : Yes [4 or more  times a week (4 pts)] : 4 or more  times a week (4 points) [Never (0 pts)] : Never (0 points) [1 or 2 (0 pts)] : 1 or 2 (0 points) [No falls in past year] : Patient reported no falls in the past year [No] : In the past 12 months have you used drugs other than those required for medical reasons? No [0] : 2) Feeling down, depressed, or hopeless: Not at all (0) [] : No [Audit-CScore] : 4 [EGI4Bopfw] : 0

## 2019-06-20 NOTE — REVIEW OF SYSTEMS
[Recent Change In Weight] : ~T recent weight change [Negative] : Heme/Lymph [Fever] : no fever [Fatigue] : no fatigue [Chills] : no chills [Night Sweats] : no night sweats [Hot Flashes] : no hot flashes

## 2019-06-20 NOTE — ASSESSMENT
[FreeTextEntry1] : Assessment and plan:\par \par 1. Hypertension. Continue amlodipine 5 mg with dialysis on hydrochlorothiazide and metoprolol tartrate 25 mg\par \par 2. Ischemic heart disease, excellent control. Patient doing well. Chest pain-free.\par \par 3. Carotid stenosis, status post bilateral carotid endarterectomy. Continue aspirin and Plavix.\par \par 4. Weight loss. Patient has had comprehensive workup by hematology, oncology recommended supplements.\par \par 5. Comprehensive blood work obtained

## 2019-06-21 LAB
ALBUMIN SERPL ELPH-MCNC: 4.7 G/DL
ALP BLD-CCNC: 65 U/L
ALT SERPL-CCNC: 20 U/L
ANION GAP SERPL CALC-SCNC: 13 MMOL/L
AST SERPL-CCNC: 26 U/L
BASOPHILS # BLD AUTO: 0.03 K/UL
BASOPHILS NFR BLD AUTO: 0.4 %
BILIRUB SERPL-MCNC: 0.4 MG/DL
BUN SERPL-MCNC: 19 MG/DL
CALCIUM SERPL-MCNC: 9.8 MG/DL
CHLORIDE SERPL-SCNC: 103 MMOL/L
CHOLEST SERPL-MCNC: 161 MG/DL
CHOLEST/HDLC SERPL: 2.5 RATIO
CO2 SERPL-SCNC: 25 MMOL/L
CREAT SERPL-MCNC: 1 MG/DL
EOSINOPHIL # BLD AUTO: 0.29 K/UL
EOSINOPHIL NFR BLD AUTO: 3.8 %
ESTIMATED AVERAGE GLUCOSE: 108 MG/DL
GLUCOSE SERPL-MCNC: 89 MG/DL
HBA1C MFR BLD HPLC: 5.4 %
HCT VFR BLD CALC: 43.1 %
HDLC SERPL-MCNC: 64 MG/DL
HGB BLD-MCNC: 13.4 G/DL
IMM GRANULOCYTES NFR BLD AUTO: 0.4 %
LDLC SERPL CALC-MCNC: 62 MG/DL
LYMPHOCYTES # BLD AUTO: 1.53 K/UL
LYMPHOCYTES NFR BLD AUTO: 20.2 %
MAN DIFF?: NORMAL
MCHC RBC-ENTMCNC: 31.1 GM/DL
MCHC RBC-ENTMCNC: 31.8 PG
MCV RBC AUTO: 102.4 FL
MONOCYTES # BLD AUTO: 0.79 K/UL
MONOCYTES NFR BLD AUTO: 10.4 %
NEUTROPHILS # BLD AUTO: 4.89 K/UL
NEUTROPHILS NFR BLD AUTO: 64.8 %
PLATELET # BLD AUTO: 194 K/UL
POTASSIUM SERPL-SCNC: 4.1 MMOL/L
PROT SERPL-MCNC: 7.2 G/DL
RBC # BLD: 4.21 M/UL
RBC # FLD: 13.8 %
SODIUM SERPL-SCNC: 141 MMOL/L
T4 FREE SERPL-MCNC: 1.3 NG/DL
TRIGL SERPL-MCNC: 175 MG/DL
TSH SERPL-ACNC: 1.46 UIU/ML
WBC # FLD AUTO: 7.56 K/UL

## 2019-07-23 PROBLEM — D75.89 MACROCYTOSIS: Status: ACTIVE | Noted: 2019-07-23

## 2019-07-24 ENCOUNTER — MEDICATION RENEWAL (OUTPATIENT)
Age: 69
End: 2019-07-24

## 2019-07-25 ENCOUNTER — APPOINTMENT (OUTPATIENT)
Age: 69
End: 2019-07-25
Payer: MEDICARE

## 2019-07-25 ENCOUNTER — LABORATORY RESULT (OUTPATIENT)
Age: 69
End: 2019-07-25

## 2019-07-25 VITALS — SYSTOLIC BLOOD PRESSURE: 148 MMHG | TEMPERATURE: 98.5 F | DIASTOLIC BLOOD PRESSURE: 54 MMHG | HEART RATE: 69 BPM

## 2019-07-25 DIAGNOSIS — D75.89 OTHER SPECIFIED DISEASES OF BLOOD AND BLOOD-FORMING ORGANS: ICD-10-CM

## 2019-07-25 LAB
HCT VFR BLD CALC: 38.6 %
HGB BLD-MCNC: 12.8 G/DL
MCHC RBC-ENTMCNC: 32.1 PG
MCHC RBC-ENTMCNC: 33.1 GM/DL
MCV RBC AUTO: 97 FL
PLATELET # BLD AUTO: 160 K/UL
RBC # BLD: 3.98 M/UL
RBC # FLD: 12.5 %
WBC # FLD AUTO: 7.4 K/UL

## 2019-07-25 PROCEDURE — 96523 IRRIG DRUG DELIVERY DEVICE: CPT

## 2019-07-26 LAB
ALBUMIN SERPL ELPH-MCNC: 4.5 G/DL
ALP BLD-CCNC: 58 U/L
ALT SERPL-CCNC: 14 U/L
ANION GAP SERPL CALC-SCNC: 14 MMOL/L
AST SERPL-CCNC: 25 U/L
BILIRUB SERPL-MCNC: 0.3 MG/DL
BUN SERPL-MCNC: 15 MG/DL
CALCIUM SERPL-MCNC: 9.3 MG/DL
CEA SERPL-MCNC: 1.5 NG/ML
CHLORIDE SERPL-SCNC: 104 MMOL/L
CO2 SERPL-SCNC: 22 MMOL/L
CREAT SERPL-MCNC: 0.88 MG/DL
FOLATE SERPL-MCNC: 12 NG/ML
GLUCOSE SERPL-MCNC: 92 MG/DL
POTASSIUM SERPL-SCNC: 4.1 MMOL/L
PROT SERPL-MCNC: 6.9 G/DL
SODIUM SERPL-SCNC: 140 MMOL/L
VIT B12 SERPL-MCNC: 876 PG/ML

## 2019-10-23 ENCOUNTER — OUTPATIENT (OUTPATIENT)
Dept: OUTPATIENT SERVICES | Facility: HOSPITAL | Age: 69
LOS: 1 days | End: 2019-10-23
Payer: MEDICARE

## 2019-10-23 VITALS
SYSTOLIC BLOOD PRESSURE: 153 MMHG | DIASTOLIC BLOOD PRESSURE: 68 MMHG | HEART RATE: 73 BPM | TEMPERATURE: 98 F | WEIGHT: 138.01 LBS

## 2019-10-23 DIAGNOSIS — Z98.89 OTHER SPECIFIED POSTPROCEDURAL STATES: Chronic | ICD-10-CM

## 2019-10-23 DIAGNOSIS — Z90.49 ACQUIRED ABSENCE OF OTHER SPECIFIED PARTS OF DIGESTIVE TRACT: Chronic | ICD-10-CM

## 2019-10-23 DIAGNOSIS — R69 ILLNESS, UNSPECIFIED: ICD-10-CM

## 2019-10-23 DIAGNOSIS — C18.9 MALIGNANT NEOPLASM OF COLON, UNSPECIFIED: Chronic | ICD-10-CM

## 2019-10-23 PROCEDURE — 96523 IRRIG DRUG DELIVERY DEVICE: CPT

## 2019-10-23 RX ORDER — SODIUM CHLORIDE 9 MG/ML
10 INJECTION INTRAMUSCULAR; INTRAVENOUS; SUBCUTANEOUS ONCE
Refills: 0 | Status: COMPLETED | OUTPATIENT
Start: 2019-10-23 | End: 2019-10-23

## 2019-10-23 RX ADMIN — Medication 5 MILLILITER(S): at 14:45

## 2019-10-23 RX ADMIN — SODIUM CHLORIDE 10 MILLILITER(S): 9 INJECTION INTRAMUSCULAR; INTRAVENOUS; SUBCUTANEOUS at 14:45

## 2019-10-24 DIAGNOSIS — Z45.2 ENCOUNTER FOR ADJUSTMENT AND MANAGEMENT OF VASCULAR ACCESS DEVICE: ICD-10-CM

## 2019-10-24 DIAGNOSIS — R69 ILLNESS, UNSPECIFIED: ICD-10-CM

## 2019-10-24 DIAGNOSIS — C18.9 MALIGNANT NEOPLASM OF COLON, UNSPECIFIED: ICD-10-CM

## 2019-10-28 ENCOUNTER — APPOINTMENT (OUTPATIENT)
Dept: FAMILY MEDICINE | Facility: CLINIC | Age: 69
End: 2019-10-28
Payer: MEDICARE

## 2019-10-28 VITALS
WEIGHT: 133 LBS | OXYGEN SATURATION: 96 % | DIASTOLIC BLOOD PRESSURE: 66 MMHG | TEMPERATURE: 98.2 F | SYSTOLIC BLOOD PRESSURE: 152 MMHG | HEART RATE: 59 BPM | BODY MASS INDEX: 22.71 KG/M2 | HEIGHT: 64 IN

## 2019-10-28 VITALS — RESPIRATION RATE: 14 BRPM

## 2019-10-28 DIAGNOSIS — H26.9 UNSPECIFIED CATARACT: ICD-10-CM

## 2019-10-28 PROCEDURE — 99214 OFFICE O/P EST MOD 30 MIN: CPT

## 2019-10-28 NOTE — PLAN
[FreeTextEntry1] : Assessment and plan:\par \par There is no medical contraindication for the proposed procedure.

## 2019-10-28 NOTE — ASSESSMENT
[High Risk Surgery - Intraperitoneal, Intrathoracic or Supringuinal Vascular Procedures] : High Risk Surgery - Intraperitoneal, Intrathoracic or Supringuinal Vascular Procedures - No (0) [Ischemic Heart Disease] : Ischemic Heart Disease  - Yes (1) [Congestive Heart Failure] : Congestive Heart Failure - No (0) [Prior Cerebrovascular Accident or TIA] : Prior Cerebrovascular Accident or TIA - Yes (1) [Creatinine >= 2mg/dL (1 Point)] : Creatinine >= 2mg/dL - No (0) [Insulin-dependent Diabetic (1 Point)] : Insulin-dependent Diabetic - No (0) [2] : 2 , RCRI Class: III, Risk of Post-Op Cardiac Complications: 6.6%, Procedure Risk: Elevated-Risk [Patient Optimized for Surgery] : Patient optimized for surgery [No Further Testing Recommended] : no further testing recommended [Continue medications as is] : Continue current medications [FreeTextEntry4] : Cardiac risk index. for  patient class III, procedure, itself, was low risk

## 2019-10-28 NOTE — HISTORY OF PRESENT ILLNESS
[Aortic Stenosis] : no aortic stenosis [Atrial Fibrillation] : no atrial fibrillation [Coronary Artery Disease] : coronary artery disease [Recent Myocardial Infarction] : no recent myocardial infarction [Implantable Device/Pacemaker] : no implantable device/pacemaker [No Pertinent Pulmonary History] : no history of asthma, COPD, sleep apnea, or smoking [Asthma] : no asthma [COPD] : no COPD [Sleep Apnea] : no sleep apnea [Smoker] : not a smoker [No Adverse Anesthesia Reaction] : no adverse anesthesia reaction in self or family member [Family Member] : no family member with adverse anesthesia reaction/sudden death [Self] : no previous adverse anesthesia reaction [Chronic Anticoagulation] : no chronic anticoagulation [Chronic Kidney Disease] : no chronic kidney disease [Diabetes] : no diabetes [(Patient denies any chest pain, claudication, dyspnea on exertion, orthopnea, palpitations or syncope)] : Patient denies any chest pain, claudication, dyspnea on exertion, orthopnea, palpitations or syncope [FreeTextEntry1] : Left cataract [FreeTextEntry2] : November 6, 2019 [FreeTextEntry3] : Dr. Kwame Munoz [FreeTextEntry4] : Patient is a 69-year-old gentleman, who presents today for preoperative clearance. He will be having cataract surgery of left eye on Wednesday, November 6, 2019, which will be done by Dr. Kwame Munoz. Patient's medical history is significant for hypertension, atherosclerosis, history of CVA, hyperlipidemia, history of colon cancer, status post resection and chemotherapy. Presently, the patient is awake, alert, and oriented x3. He is in no acute distress. He is calm, cooperative, well-groomed, and nourished. He denies any chest pain, shortness of breath, nausea, or vomiting. [FreeTextEntry7] : Patient had echocardiogram done in December of 2018, which was basically normal

## 2019-10-28 NOTE — PHYSICAL EXAM
[No Acute Distress] : no acute distress [Well Nourished] : well nourished [Well Developed] : well developed [Well-Appearing] : well-appearing [Normal Voice/Communication] : normal voice/communication [Normal Sclera/Conjunctiva] : normal sclera/conjunctiva [PERRL] : pupils equal round and reactive to light [EOMI] : extraocular movements intact [Normal Outer Ear/Nose] : the outer ears and nose were normal in appearance [Normal Oropharynx] : the oropharynx was normal [No JVD] : no jugular venous distention [No Lymphadenopathy] : no lymphadenopathy [Supple] : supple [Thyroid Normal, No Nodules] : the thyroid was normal and there were no nodules present [No Respiratory Distress] : no respiratory distress  [No Accessory Muscle Use] : no accessory muscle use [Clear to Auscultation] : lungs were clear to auscultation bilaterally [Normal Rate] : normal rate  [Regular Rhythm] : with a regular rhythm [Normal S1, S2] : normal S1 and S2 [No Murmur] : no murmur heard [No Carotid Bruits] : no carotid bruits [No Abdominal Bruit] : a ~M bruit was not heard ~T in the abdomen [No Varicosities] : no varicosities [Pedal Pulses Present] : the pedal pulses are present [No Edema] : there was no peripheral edema [No Palpable Aorta] : no palpable aorta [No Extremity Clubbing/Cyanosis] : no extremity clubbing/cyanosis [Soft] : abdomen soft [Non Tender] : non-tender [Non-distended] : non-distended [No Masses] : no abdominal mass palpated [No HSM] : no HSM [Normal Bowel Sounds] : normal bowel sounds [No Hernias] : no hernias [Normal Posterior Cervical Nodes] : no posterior cervical lymphadenopathy [Normal Anterior Cervical Nodes] : no anterior cervical lymphadenopathy [No CVA Tenderness] : no CVA  tenderness [No Spinal Tenderness] : no spinal tenderness [No Joint Swelling] : no joint swelling [Grossly Normal Strength/Tone] : grossly normal strength/tone [No Rash] : no rash [Coordination Grossly Intact] : coordination grossly intact [No Focal Deficits] : no focal deficits [Normal Gait] : normal gait [Deep Tendon Reflexes (DTR)] : deep tendon reflexes were 2+ and symmetric [Speech Grossly Normal] : speech grossly normal [Memory Grossly Normal] : memory grossly normal [Normal Affect] : the affect was normal [Alert and Oriented x3] : oriented to person, place, and time [Normal Mood] : the mood was normal [Normal Insight/Judgement] : insight and judgment were intact [de-identified] : Well-healed endarterectomy scars bilaterally

## 2019-11-03 RX ORDER — ACETAMINOPHEN 500 MG
650 TABLET ORAL EVERY 6 HOURS
Refills: 0 | Status: DISCONTINUED | OUTPATIENT
Start: 2019-11-06 | End: 2019-11-22

## 2019-11-05 ENCOUNTER — TRANSCRIPTION ENCOUNTER (OUTPATIENT)
Age: 69
End: 2019-11-05

## 2019-11-05 NOTE — ASU PATIENT PROFILE, ADULT - PSH
Colon cancer  [facility] 2014 for chemotherapy due to colon cancer with METS to liver  Colon cancer  diagnosed in March 2014 with subsequent colon cancer followed with chemotherapy  H/O resection of liver  2015  H/O skin graft  1984  S/P carotid endarterectomy  11/2013  left side

## 2019-11-05 NOTE — ASU PATIENT PROFILE, ADULT - PMH
Arthropathy  left hip > right hip  Carotid artery occlusion  left side in November 2013  Left CEA 11/2013  Clostridium difficile infection  on short term vancomyacin  Colon cancer  had surgery in March 2014 with mets to liver-- received chemotherapy  CVA (cerebral vascular accident)  2005 -- wife and patient state no residual  Depression  sts was depressed "I lost my brother while going to this"  Hyperlipidemia    Hypertension    Language barrier  native language Surinamese; comprehends and verbalizes English but needs assistance with any explainations that are complicated  Liver cancer  METS from the colon -- has received chemotherapy  Osteoarthritis    Reflux    Snoring  DREW precautions -- responds affirmatively to STOP BANG questionnaire -- admits to loud snoring; age > 50; h/o htn; gender, male; h/o htn Arthropathy  left hip > right hip  Carotid artery occlusion  left side in November 2013  Left CEA 11/2013  Clostridium difficile infection  on short term vancomyacin  Colon cancer  had surgery in March 2014 with mets to liver-- received chemotherapy  CVA (cerebral vascular accident)  2005 -- wife and patient state no residual  Depression  sts was depressed "I lost my brother while going to this"  Hyperlipidemia    Hypertension    Liver cancer  METS from the colon -- has received chemotherapy  Osteoarthritis    Reflux    Snoring  DREW precautions -- responds affirmatively to STOP BANG questionnaire -- admits to loud snoring; age > 50; h/o htn; gender, male; h/o htn Arthropathy  left hip > right hip  Atherosclerosis    Carotid artery occlusion  left side in November 2013  Left CEA 11/2013  Clostridium difficile infection  on short term vancomyacin  Colon cancer  had surgery in March 2014 with mets to liver-- received chemotherapy  CVA (cerebral vascular accident)  2005 -- wife and patient state no residual  Depression  sts was depressed "I lost my brother while going to this"  DJD (degenerative joint disease)    Hyperlipidemia    Hypertension    Liver cancer  METS from the colon -- has received chemotherapy  Lumbar stenosis    Osteoarthritis    Reflux    Snoring  DREW precautions -- responds affirmatively to STOP BANG questionnaire -- admits to loud snoring; age > 50; h/o htn; gender, male; h/o htn

## 2019-11-06 ENCOUNTER — OUTPATIENT (OUTPATIENT)
Dept: OUTPATIENT SERVICES | Facility: HOSPITAL | Age: 69
LOS: 1 days | Discharge: HOME | End: 2019-11-06
Payer: MEDICARE

## 2019-11-06 VITALS
HEART RATE: 60 BPM | RESPIRATION RATE: 14 BRPM | DIASTOLIC BLOOD PRESSURE: 60 MMHG | SYSTOLIC BLOOD PRESSURE: 130 MMHG | OXYGEN SATURATION: 97 % | TEMPERATURE: 97 F | HEIGHT: 64 IN | WEIGHT: 132.94 LBS

## 2019-11-06 VITALS
DIASTOLIC BLOOD PRESSURE: 64 MMHG | SYSTOLIC BLOOD PRESSURE: 106 MMHG | TEMPERATURE: 98 F | OXYGEN SATURATION: 97 % | RESPIRATION RATE: 14 BRPM | HEART RATE: 73 BPM

## 2019-11-06 DIAGNOSIS — C18.9 MALIGNANT NEOPLASM OF COLON, UNSPECIFIED: Chronic | ICD-10-CM

## 2019-11-06 DIAGNOSIS — H25.12 AGE-RELATED NUCLEAR CATARACT, LEFT EYE: ICD-10-CM

## 2019-11-06 DIAGNOSIS — Z98.89 OTHER SPECIFIED POSTPROCEDURAL STATES: Chronic | ICD-10-CM

## 2019-11-06 DIAGNOSIS — Z90.49 ACQUIRED ABSENCE OF OTHER SPECIFIED PARTS OF DIGESTIVE TRACT: Chronic | ICD-10-CM

## 2019-11-06 PROCEDURE — 66984 XCAPSL CTRC RMVL W/O ECP: CPT | Mod: LT

## 2019-11-06 PROCEDURE — V2632: CPT

## 2019-11-06 RX ORDER — ACETAMINOPHEN 500 MG
650 TABLET ORAL EVERY 6 HOURS
Refills: 0 | Status: DISCONTINUED | OUTPATIENT
Start: 2019-11-06 | End: 2019-11-22

## 2019-11-06 RX ORDER — CYCLOPENTOLATE HYDROCHLORIDE 10 MG/ML
1 SOLUTION/ DROPS OPHTHALMIC
Refills: 0 | Status: COMPLETED | OUTPATIENT
Start: 2019-11-06 | End: 2019-11-06

## 2019-11-06 RX ORDER — TROPICAMIDE 1 %
1 DROPS OPHTHALMIC (EYE)
Refills: 0 | Status: COMPLETED | OUTPATIENT
Start: 2019-11-06 | End: 2019-11-06

## 2019-11-06 RX ORDER — SODIUM CHLORIDE 9 MG/ML
1000 INJECTION, SOLUTION INTRAVENOUS
Refills: 0 | Status: DISCONTINUED | OUTPATIENT
Start: 2019-11-06 | End: 2019-11-06

## 2019-11-06 RX ORDER — CYCLOBENZAPRINE HYDROCHLORIDE 10 MG/1
1 TABLET, FILM COATED ORAL
Qty: 0 | Refills: 0 | DISCHARGE

## 2019-11-06 RX ORDER — KETOROLAC TROMETHAMINE 0.5 %
1 DROPS OPHTHALMIC (EYE)
Refills: 0 | Status: COMPLETED | OUTPATIENT
Start: 2019-11-06 | End: 2019-11-06

## 2019-11-06 RX ORDER — PHENYLEPHRINE HCL 2.5 %
1 DROPS OPHTHALMIC (EYE)
Refills: 0 | Status: COMPLETED | OUTPATIENT
Start: 2019-11-06 | End: 2019-11-06

## 2019-11-06 RX ORDER — CLOPIDOGREL BISULFATE 75 MG/1
1 TABLET, FILM COATED ORAL
Qty: 0 | Refills: 0 | DISCHARGE

## 2019-11-06 RX ADMIN — CYCLOPENTOLATE HYDROCHLORIDE 1 DROP(S): 10 SOLUTION/ DROPS OPHTHALMIC at 09:01

## 2019-11-06 RX ADMIN — CYCLOPENTOLATE HYDROCHLORIDE 1 DROP(S): 10 SOLUTION/ DROPS OPHTHALMIC at 08:57

## 2019-11-06 RX ADMIN — Medication 1 DROP(S): at 08:51

## 2019-11-06 RX ADMIN — CYCLOPENTOLATE HYDROCHLORIDE 1 DROP(S): 10 SOLUTION/ DROPS OPHTHALMIC at 08:52

## 2019-11-06 RX ADMIN — Medication 1 DROP(S): at 08:56

## 2019-11-06 RX ADMIN — Medication 1 DROP(S): at 08:52

## 2019-11-06 RX ADMIN — Medication 1 DROP(S): at 09:00

## 2019-11-06 RX ADMIN — Medication 1 DROP(S): at 09:01

## 2019-11-06 RX ADMIN — Medication 1 DROP(S): at 08:57

## 2019-11-06 RX ADMIN — SODIUM CHLORIDE 40 MILLILITER(S): 9 INJECTION, SOLUTION INTRAVENOUS at 09:11

## 2019-11-06 NOTE — ASU DISCHARGE PLAN (ADULT/PEDIATRIC) - ASU DC SPECIAL INSTRUCTIONSFT
Keep shield on eye until office visit today at 2:15 pm  Any problems call office at [phone]    OFFICE VISIT TODAY AT 2:15 pm  Bring Drops

## 2019-11-06 NOTE — ASU DISCHARGE PLAN (ADULT/PEDIATRIC) - NURSING INSTRUCTIONS
All discharge information reviewed with patient and wife including pain, safety, medication and follow up care

## 2019-12-03 ENCOUNTER — MEDICATION RENEWAL (OUTPATIENT)
Age: 69
End: 2019-12-03

## 2019-12-10 PROBLEM — I70.90 UNSPECIFIED ATHEROSCLEROSIS: Chronic | Status: ACTIVE | Noted: 2019-11-06

## 2019-12-10 PROBLEM — M48.061 SPINAL STENOSIS, LUMBAR REGION WITHOUT NEUROGENIC CLAUDICATION: Chronic | Status: ACTIVE | Noted: 2019-11-06

## 2019-12-10 PROBLEM — M19.90 UNSPECIFIED OSTEOARTHRITIS, UNSPECIFIED SITE: Chronic | Status: ACTIVE | Noted: 2019-11-06

## 2019-12-12 ENCOUNTER — OUTPATIENT (OUTPATIENT)
Dept: OUTPATIENT SERVICES | Facility: HOSPITAL | Age: 69
LOS: 1 days | End: 2019-12-12
Payer: MEDICARE

## 2019-12-12 ENCOUNTER — APPOINTMENT (OUTPATIENT)
Age: 69
End: 2019-12-12

## 2019-12-12 VITALS
TEMPERATURE: 98 F | WEIGHT: 136.69 LBS | SYSTOLIC BLOOD PRESSURE: 168 MMHG | HEART RATE: 67 BPM | DIASTOLIC BLOOD PRESSURE: 73 MMHG

## 2019-12-12 DIAGNOSIS — C18.9 MALIGNANT NEOPLASM OF COLON, UNSPECIFIED: Chronic | ICD-10-CM

## 2019-12-12 DIAGNOSIS — Z98.89 OTHER SPECIFIED POSTPROCEDURAL STATES: Chronic | ICD-10-CM

## 2019-12-12 DIAGNOSIS — C18.9 MALIGNANT NEOPLASM OF COLON, UNSPECIFIED: ICD-10-CM

## 2019-12-12 DIAGNOSIS — Z90.49 ACQUIRED ABSENCE OF OTHER SPECIFIED PARTS OF DIGESTIVE TRACT: Chronic | ICD-10-CM

## 2019-12-12 DIAGNOSIS — R69 ILLNESS, UNSPECIFIED: ICD-10-CM

## 2019-12-12 LAB
ALBUMIN SERPL ELPH-MCNC: 4 G/DL — SIGNIFICANT CHANGE UP (ref 3.3–5)
ALP SERPL-CCNC: 74 U/L — SIGNIFICANT CHANGE UP (ref 40–120)
ALT FLD-CCNC: 31 U/L — SIGNIFICANT CHANGE UP (ref 12–78)
ANION GAP SERPL CALC-SCNC: 6 MMOL/L — SIGNIFICANT CHANGE UP (ref 5–17)
AST SERPL-CCNC: 26 U/L — SIGNIFICANT CHANGE UP (ref 15–37)
BASOPHILS # BLD AUTO: 0.02 K/UL — SIGNIFICANT CHANGE UP (ref 0–0.2)
BASOPHILS NFR BLD AUTO: 0.2 % — SIGNIFICANT CHANGE UP (ref 0–2)
BILIRUB SERPL-MCNC: 0.4 MG/DL — SIGNIFICANT CHANGE UP (ref 0.2–1.2)
BUN SERPL-MCNC: 19 MG/DL — SIGNIFICANT CHANGE UP (ref 7–23)
CALCIUM SERPL-MCNC: 9.1 MG/DL — SIGNIFICANT CHANGE UP (ref 8.5–10.1)
CEA SERPL-MCNC: 1.2 NG/ML — SIGNIFICANT CHANGE UP (ref 0–3.8)
CHLORIDE SERPL-SCNC: 107 MMOL/L — SIGNIFICANT CHANGE UP (ref 96–108)
CO2 SERPL-SCNC: 26 MMOL/L — SIGNIFICANT CHANGE UP (ref 22–31)
CREAT SERPL-MCNC: 0.9 MG/DL — SIGNIFICANT CHANGE UP (ref 0.5–1.3)
EOSINOPHIL # BLD AUTO: 0.14 K/UL — SIGNIFICANT CHANGE UP (ref 0–0.5)
EOSINOPHIL NFR BLD AUTO: 1.7 % — SIGNIFICANT CHANGE UP (ref 0–6)
GLUCOSE SERPL-MCNC: 94 MG/DL — SIGNIFICANT CHANGE UP (ref 70–99)
HCT VFR BLD CALC: 41.4 % — SIGNIFICANT CHANGE UP (ref 39–50)
HGB BLD-MCNC: 13.9 G/DL — SIGNIFICANT CHANGE UP (ref 13–17)
IMM GRANULOCYTES NFR BLD AUTO: 0.2 % — SIGNIFICANT CHANGE UP (ref 0–1.5)
LYMPHOCYTES # BLD AUTO: 1.6 K/UL — SIGNIFICANT CHANGE UP (ref 1–3.3)
LYMPHOCYTES # BLD AUTO: 19.9 % — SIGNIFICANT CHANGE UP (ref 13–44)
MCHC RBC-ENTMCNC: 32.6 PG — SIGNIFICANT CHANGE UP (ref 27–34)
MCHC RBC-ENTMCNC: 33.6 GM/DL — SIGNIFICANT CHANGE UP (ref 32–36)
MCV RBC AUTO: 97.2 FL — SIGNIFICANT CHANGE UP (ref 80–100)
MONOCYTES # BLD AUTO: 0.85 K/UL — SIGNIFICANT CHANGE UP (ref 0–0.9)
MONOCYTES NFR BLD AUTO: 10.6 % — SIGNIFICANT CHANGE UP (ref 2–14)
NEUTROPHILS # BLD AUTO: 5.41 K/UL — SIGNIFICANT CHANGE UP (ref 1.8–7.4)
NEUTROPHILS NFR BLD AUTO: 67.4 % — SIGNIFICANT CHANGE UP (ref 43–77)
PLATELET # BLD AUTO: 210 K/UL — SIGNIFICANT CHANGE UP (ref 150–400)
POTASSIUM SERPL-MCNC: 3.8 MMOL/L — SIGNIFICANT CHANGE UP (ref 3.5–5.3)
POTASSIUM SERPL-SCNC: 3.8 MMOL/L — SIGNIFICANT CHANGE UP (ref 3.5–5.3)
PROT SERPL-MCNC: 7.6 GM/DL — SIGNIFICANT CHANGE UP (ref 6–8.3)
RBC # BLD: 4.26 M/UL — SIGNIFICANT CHANGE UP (ref 4.2–5.8)
RBC # FLD: 13.1 % — SIGNIFICANT CHANGE UP (ref 10.3–14.5)
SODIUM SERPL-SCNC: 139 MMOL/L — SIGNIFICANT CHANGE UP (ref 135–145)
WBC # BLD: 8.04 K/UL — SIGNIFICANT CHANGE UP (ref 3.8–10.5)
WBC # FLD AUTO: 8.04 K/UL — SIGNIFICANT CHANGE UP (ref 3.8–10.5)

## 2019-12-12 PROCEDURE — 96523 IRRIG DRUG DELIVERY DEVICE: CPT

## 2019-12-12 PROCEDURE — 36415 COLL VENOUS BLD VENIPUNCTURE: CPT

## 2019-12-12 PROCEDURE — 80053 COMPREHEN METABOLIC PANEL: CPT

## 2019-12-12 PROCEDURE — 82378 CARCINOEMBRYONIC ANTIGEN: CPT

## 2019-12-12 PROCEDURE — 85025 COMPLETE CBC W/AUTO DIFF WBC: CPT

## 2019-12-12 RX ORDER — SODIUM CHLORIDE 9 MG/ML
10 INJECTION INTRAMUSCULAR; INTRAVENOUS; SUBCUTANEOUS ONCE
Refills: 0 | Status: COMPLETED | OUTPATIENT
Start: 2019-12-12 | End: 2019-12-12

## 2019-12-12 RX ADMIN — SODIUM CHLORIDE 10 MILLILITER(S): 9 INJECTION INTRAMUSCULAR; INTRAVENOUS; SUBCUTANEOUS at 14:25

## 2019-12-13 DIAGNOSIS — Z45.2 ENCOUNTER FOR ADJUSTMENT AND MANAGEMENT OF VASCULAR ACCESS DEVICE: ICD-10-CM

## 2020-01-23 ENCOUNTER — OUTPATIENT (OUTPATIENT)
Dept: OUTPATIENT SERVICES | Facility: HOSPITAL | Age: 70
LOS: 1 days | Discharge: HOME | End: 2020-01-23

## 2020-01-23 DIAGNOSIS — Z90.49 ACQUIRED ABSENCE OF OTHER SPECIFIED PARTS OF DIGESTIVE TRACT: Chronic | ICD-10-CM

## 2020-01-23 DIAGNOSIS — Z98.89 OTHER SPECIFIED POSTPROCEDURAL STATES: Chronic | ICD-10-CM

## 2020-01-23 DIAGNOSIS — C18.9 MALIGNANT NEOPLASM OF COLON, UNSPECIFIED: Chronic | ICD-10-CM

## 2020-01-23 DIAGNOSIS — C18.9 MALIGNANT NEOPLASM OF COLON, UNSPECIFIED: ICD-10-CM

## 2020-01-24 DIAGNOSIS — Z01.818 ENCOUNTER FOR OTHER PREPROCEDURAL EXAMINATION: ICD-10-CM

## 2020-01-28 ENCOUNTER — APPOINTMENT (OUTPATIENT)
Age: 70
End: 2020-01-28
Payer: MEDICARE

## 2020-01-28 VITALS
SYSTOLIC BLOOD PRESSURE: 170 MMHG | WEIGHT: 139 LBS | DIASTOLIC BLOOD PRESSURE: 65 MMHG | BODY MASS INDEX: 23.73 KG/M2 | HEART RATE: 73 BPM | TEMPERATURE: 97.5 F | RESPIRATION RATE: 14 BRPM | HEIGHT: 64 IN

## 2020-01-28 PROCEDURE — 99214 OFFICE O/P EST MOD 30 MIN: CPT

## 2020-01-28 NOTE — REASON FOR VISIT
[Follow-Up Visit] : a follow-up [Family Member] : family member [Spouse] : spouse [FreeTextEntry2] : colon cancer

## 2020-01-28 NOTE — HISTORY OF PRESENT ILLNESS
[Disease: _____________________] : Disease: [unfilled] [M: ___] : M[unfilled] [AJCC Stage: ____] : AJCC Stage: [unfilled] [de-identified] : 69 M diagnosed with colon adenocarcinoma, oligo- metastatic to liver since March 2014 and metastatic to lung since 2016. [FreeTextEntry1] : \par -status post left lung metastasectomy performed February 9, 2017\par -last chemotherapy with Capecitabine -  August 2015 [de-identified] : Adenocarcinoma [de-identified] : CEA [de-identified] : Here for follow up; last seen in the office in March 2019. It has been almost 6 years since he was diagnosed with metastatic disease to the liver (March 2014) and 4 years since diagnosis of metastases to the lung. Last restaging scan from August 13, 2018 showed no evidence of recurrent disease. He continues to work, maintaining normal appetite and weight, off systemic chemotherapy since August 2015. Accompanied by his spouse, and daughter Talia

## 2020-01-28 NOTE — PHYSICAL EXAM
[Restricted in physically strenuous activity but ambulatory and able to carry out work of a light or sedentary nature] : Status 1- Restricted in physically strenuous activity but ambulatory and able to carry out work of a light or sedentary nature, e.g., light house work, office work [Normal] : grossly intact [de-identified] : right side scar, s/p  right carotid thrombarterectomy

## 2020-01-28 NOTE — ASSESSMENT
[FreeTextEntry1] : Mr. MORILLO 's questions were answered to his satisfaction. He  expressed his  understanding and willingness to comply with the above recommendations, and  will return to the office in  months.\par \par \par \par

## 2020-03-17 ENCOUNTER — OUTPATIENT (OUTPATIENT)
Dept: OUTPATIENT SERVICES | Facility: HOSPITAL | Age: 70
LOS: 1 days | Discharge: HOME | End: 2020-03-17

## 2020-03-17 DIAGNOSIS — Z98.89 OTHER SPECIFIED POSTPROCEDURAL STATES: Chronic | ICD-10-CM

## 2020-03-17 DIAGNOSIS — C18.9 MALIGNANT NEOPLASM OF COLON, UNSPECIFIED: Chronic | ICD-10-CM

## 2020-03-17 DIAGNOSIS — C18.9 MALIGNANT NEOPLASM OF COLON, UNSPECIFIED: ICD-10-CM

## 2020-03-17 DIAGNOSIS — Z90.49 ACQUIRED ABSENCE OF OTHER SPECIFIED PARTS OF DIGESTIVE TRACT: Chronic | ICD-10-CM

## 2020-03-20 ENCOUNTER — APPOINTMENT (OUTPATIENT)
Dept: INFUSION THERAPY | Facility: CLINIC | Age: 70
End: 2020-03-20

## 2020-05-14 ENCOUNTER — OUTPATIENT (OUTPATIENT)
Dept: OUTPATIENT SERVICES | Facility: HOSPITAL | Age: 70
LOS: 1 days | Discharge: HOME | End: 2020-05-14

## 2020-05-14 DIAGNOSIS — C18.9 MALIGNANT NEOPLASM OF COLON, UNSPECIFIED: ICD-10-CM

## 2020-05-14 DIAGNOSIS — C18.9 MALIGNANT NEOPLASM OF COLON, UNSPECIFIED: Chronic | ICD-10-CM

## 2020-05-14 DIAGNOSIS — Z98.89 OTHER SPECIFIED POSTPROCEDURAL STATES: Chronic | ICD-10-CM

## 2020-05-14 DIAGNOSIS — Z90.49 ACQUIRED ABSENCE OF OTHER SPECIFIED PARTS OF DIGESTIVE TRACT: Chronic | ICD-10-CM

## 2020-05-20 ENCOUNTER — RESULT REVIEW (OUTPATIENT)
Age: 70
End: 2020-05-20

## 2020-05-20 ENCOUNTER — APPOINTMENT (OUTPATIENT)
Age: 70
End: 2020-05-20

## 2020-05-20 VITALS
HEART RATE: 65 BPM | BODY MASS INDEX: 23.46 KG/M2 | WEIGHT: 136.7 LBS | SYSTOLIC BLOOD PRESSURE: 169 MMHG | TEMPERATURE: 97.8 F | DIASTOLIC BLOOD PRESSURE: 66 MMHG

## 2020-05-20 LAB
BASOPHILS # BLD AUTO: 0.04 K/UL — SIGNIFICANT CHANGE UP (ref 0–0.2)
BASOPHILS NFR BLD AUTO: 0.5 % — SIGNIFICANT CHANGE UP (ref 0–2)
EOSINOPHIL # BLD AUTO: 0.15 K/UL — SIGNIFICANT CHANGE UP (ref 0–0.5)
EOSINOPHIL NFR BLD AUTO: 2 % — SIGNIFICANT CHANGE UP (ref 0–6)
HCT VFR BLD CALC: 40.4 % — SIGNIFICANT CHANGE UP (ref 39–50)
HGB BLD-MCNC: 13.5 G/DL — SIGNIFICANT CHANGE UP (ref 13–17)
IMM GRANULOCYTES NFR BLD AUTO: 0.5 % — SIGNIFICANT CHANGE UP (ref 0–1.5)
LYMPHOCYTES # BLD AUTO: 1.77 K/UL — SIGNIFICANT CHANGE UP (ref 1–3.3)
LYMPHOCYTES # BLD AUTO: 23.8 % — SIGNIFICANT CHANGE UP (ref 13–44)
MCHC RBC-ENTMCNC: 31.9 PG — SIGNIFICANT CHANGE UP (ref 27–34)
MCHC RBC-ENTMCNC: 33.4 GM/DL — SIGNIFICANT CHANGE UP (ref 32–36)
MCV RBC AUTO: 95.5 FL — SIGNIFICANT CHANGE UP (ref 80–100)
MONOCYTES # BLD AUTO: 0.79 K/UL — SIGNIFICANT CHANGE UP (ref 0–0.9)
MONOCYTES NFR BLD AUTO: 10.6 % — SIGNIFICANT CHANGE UP (ref 2–14)
NEUTROPHILS # BLD AUTO: 4.66 K/UL — SIGNIFICANT CHANGE UP (ref 1.8–7.4)
NEUTROPHILS NFR BLD AUTO: 62.6 % — SIGNIFICANT CHANGE UP (ref 43–77)
NRBC # BLD: 0 /100 WBCS — SIGNIFICANT CHANGE UP (ref 0–0)
PLATELET # BLD AUTO: 182 K/UL — SIGNIFICANT CHANGE UP (ref 150–400)
RBC # BLD: 4.23 M/UL — SIGNIFICANT CHANGE UP (ref 4.2–5.8)
RBC # FLD: 13.2 % — SIGNIFICANT CHANGE UP (ref 10.3–14.5)
WBC # BLD: 7.45 K/UL — SIGNIFICANT CHANGE UP (ref 3.8–10.5)
WBC # FLD AUTO: 7.45 K/UL — SIGNIFICANT CHANGE UP (ref 3.8–10.5)

## 2020-05-21 ENCOUNTER — RX RENEWAL (OUTPATIENT)
Age: 70
End: 2020-05-21

## 2020-05-21 DIAGNOSIS — C78.7 SECONDARY MALIGNANT NEOPLASM OF LIVER AND INTRAHEPATIC BILE DUCT: ICD-10-CM

## 2020-05-21 LAB
ALBUMIN SERPL ELPH-MCNC: 4.5 G/DL — SIGNIFICANT CHANGE UP (ref 3.3–5)
ALP SERPL-CCNC: 68 U/L — SIGNIFICANT CHANGE UP (ref 40–120)
ALT FLD-CCNC: 21 U/L — SIGNIFICANT CHANGE UP (ref 10–45)
ANION GAP SERPL CALC-SCNC: 13 MMOL/L — SIGNIFICANT CHANGE UP (ref 5–17)
AST SERPL-CCNC: 28 U/L — SIGNIFICANT CHANGE UP (ref 10–40)
BILIRUB SERPL-MCNC: 0.4 MG/DL — SIGNIFICANT CHANGE UP (ref 0.2–1.2)
BUN SERPL-MCNC: 16 MG/DL — SIGNIFICANT CHANGE UP (ref 7–23)
CALCIUM SERPL-MCNC: 9.5 MG/DL — SIGNIFICANT CHANGE UP (ref 8.4–10.5)
CEA SERPL-MCNC: 1.3 NG/ML — SIGNIFICANT CHANGE UP (ref 0–3.8)
CHLORIDE SERPL-SCNC: 105 MMOL/L — SIGNIFICANT CHANGE UP (ref 96–108)
CO2 SERPL-SCNC: 25 MMOL/L — SIGNIFICANT CHANGE UP (ref 22–31)
CREAT SERPL-MCNC: 0.85 MG/DL — SIGNIFICANT CHANGE UP (ref 0.5–1.3)
GLUCOSE SERPL-MCNC: 94 MG/DL — SIGNIFICANT CHANGE UP (ref 70–99)
POTASSIUM SERPL-MCNC: 4.1 MMOL/L — SIGNIFICANT CHANGE UP (ref 3.5–5.3)
POTASSIUM SERPL-SCNC: 4.1 MMOL/L — SIGNIFICANT CHANGE UP (ref 3.5–5.3)
PROT SERPL-MCNC: 6.8 G/DL — SIGNIFICANT CHANGE UP (ref 6–8.3)
SODIUM SERPL-SCNC: 143 MMOL/L — SIGNIFICANT CHANGE UP (ref 135–145)

## 2020-06-23 ENCOUNTER — APPOINTMENT (OUTPATIENT)
Dept: CT IMAGING | Facility: HOSPITAL | Age: 70
End: 2020-06-23
Payer: MEDICARE

## 2020-06-23 ENCOUNTER — RESULT REVIEW (OUTPATIENT)
Age: 70
End: 2020-06-23

## 2020-06-23 ENCOUNTER — OUTPATIENT (OUTPATIENT)
Dept: OUTPATIENT SERVICES | Facility: HOSPITAL | Age: 70
LOS: 1 days | End: 2020-06-23
Payer: MEDICARE

## 2020-06-23 DIAGNOSIS — C18.9 MALIGNANT NEOPLASM OF COLON, UNSPECIFIED: ICD-10-CM

## 2020-06-23 DIAGNOSIS — C18.9 MALIGNANT NEOPLASM OF COLON, UNSPECIFIED: Chronic | ICD-10-CM

## 2020-06-23 DIAGNOSIS — Z98.89 OTHER SPECIFIED POSTPROCEDURAL STATES: Chronic | ICD-10-CM

## 2020-06-23 DIAGNOSIS — Z90.49 ACQUIRED ABSENCE OF OTHER SPECIFIED PARTS OF DIGESTIVE TRACT: Chronic | ICD-10-CM

## 2020-06-23 PROCEDURE — 74177 CT ABD & PELVIS W/CONTRAST: CPT | Mod: 26

## 2020-06-23 PROCEDURE — 71260 CT THORAX DX C+: CPT

## 2020-06-23 PROCEDURE — 71260 CT THORAX DX C+: CPT | Mod: 26

## 2020-06-23 PROCEDURE — 74177 CT ABD & PELVIS W/CONTRAST: CPT

## 2020-07-11 ENCOUNTER — OUTPATIENT (OUTPATIENT)
Dept: OUTPATIENT SERVICES | Facility: HOSPITAL | Age: 70
LOS: 1 days | Discharge: HOME | End: 2020-07-11

## 2020-07-11 DIAGNOSIS — C18.9 MALIGNANT NEOPLASM OF COLON, UNSPECIFIED: ICD-10-CM

## 2020-07-11 DIAGNOSIS — Z98.89 OTHER SPECIFIED POSTPROCEDURAL STATES: Chronic | ICD-10-CM

## 2020-07-11 DIAGNOSIS — C18.9 MALIGNANT NEOPLASM OF COLON, UNSPECIFIED: Chronic | ICD-10-CM

## 2020-07-11 DIAGNOSIS — C78.7 SECONDARY MALIGNANT NEOPLASM OF LIVER AND INTRAHEPATIC BILE DUCT: ICD-10-CM

## 2020-07-11 DIAGNOSIS — Z90.49 ACQUIRED ABSENCE OF OTHER SPECIFIED PARTS OF DIGESTIVE TRACT: Chronic | ICD-10-CM

## 2020-07-13 DIAGNOSIS — Z86.79 PERSONAL HISTORY OF OTHER DISEASES OF THE CIRCULATORY SYSTEM: ICD-10-CM

## 2020-07-13 DIAGNOSIS — M62.838 OTHER MUSCLE SPASM: ICD-10-CM

## 2020-07-14 ENCOUNTER — APPOINTMENT (OUTPATIENT)
Age: 70
End: 2020-07-14

## 2020-07-14 ENCOUNTER — APPOINTMENT (OUTPATIENT)
Age: 70
End: 2020-07-14
Payer: MEDICARE

## 2020-07-14 VITALS
RESPIRATION RATE: 16 BRPM | HEART RATE: 63 BPM | WEIGHT: 139.13 LBS | TEMPERATURE: 98 F | BODY MASS INDEX: 23.75 KG/M2 | HEIGHT: 64 IN | DIASTOLIC BLOOD PRESSURE: 71 MMHG | SYSTOLIC BLOOD PRESSURE: 167 MMHG

## 2020-07-14 PROCEDURE — 99214 OFFICE O/P EST MOD 30 MIN: CPT

## 2020-07-14 NOTE — ASSESSMENT
[FreeTextEntry1] : Mr. MORILLO 's questions were answered to his satisfaction. He  expressed his  understanding and willingness to comply with the above recommendations, and  will return to the office in  6 months.\par \par \par \par

## 2020-07-14 NOTE — REASON FOR VISIT
[Follow-Up Visit] : a follow-up [Spouse] : spouse [Family Member] : family member [FreeTextEntry2] : colon cancer

## 2020-07-14 NOTE — HISTORY OF PRESENT ILLNESS
[Disease: _____________________] : Disease: [unfilled] [M: ___] : M[unfilled] [AJCC Stage: ____] : AJCC Stage: [unfilled] [de-identified] : 70 M diagnosed with colon adenocarcinoma, oligo- metastatic to liver since March 2014 and metastatic to lung since 2016. [de-identified] : Adenocarcinoma [de-identified] : CEA [FreeTextEntry1] : \par -status post left lung metastasectomy performed February 9, 2017\par -last chemotherapy with Capecitabine -  August 2015 [de-identified] : Returning for follow up; last seen in office in January 2020. Mr. MORILLO has been clinically stable during this interval, and reports no new constitutional symptoms. Denies recent hospitalizations. Appears non toxic. Medication list reviewed. Reports no recent infections, fevers, no changes in weight; appetite preserved. Last CT C/A/P performed 6/23/2020 consistent with stable exam, and a slightly enlarged 3 mm left upper lobe nodule.Hematologic picture normal; CEA 1.3. Reports no recent infections, fevers, no changes in weight; appetite preserved. Continues to work 80 hours a week. He is here to have [id number] flushed; considering removal. Accompanied by spouse.

## 2020-07-14 NOTE — PHYSICAL EXAM
[Restricted in physically strenuous activity but ambulatory and able to carry out work of a light or sedentary nature] : Status 1- Restricted in physically strenuous activity but ambulatory and able to carry out work of a light or sedentary nature, e.g., light house work, office work [Normal] : normal appearance, no rash, nodules, vesicles, ulcers, erythema [de-identified] : right side scar, s/p  right carotid thrombarterectomy

## 2020-08-07 ENCOUNTER — RX RENEWAL (OUTPATIENT)
Age: 70
End: 2020-08-07

## 2020-08-26 ENCOUNTER — RX RENEWAL (OUTPATIENT)
Age: 70
End: 2020-08-26

## 2020-09-21 ENCOUNTER — APPOINTMENT (OUTPATIENT)
Dept: FAMILY MEDICINE | Facility: CLINIC | Age: 70
End: 2020-09-21
Payer: MEDICARE

## 2020-09-21 VITALS
HEART RATE: 63 BPM | SYSTOLIC BLOOD PRESSURE: 154 MMHG | DIASTOLIC BLOOD PRESSURE: 62 MMHG | RESPIRATION RATE: 16 BRPM | WEIGHT: 140 LBS | BODY MASS INDEX: 23.9 KG/M2 | HEIGHT: 64 IN | TEMPERATURE: 96.2 F | OXYGEN SATURATION: 98 %

## 2020-09-21 PROCEDURE — 99214 OFFICE O/P EST MOD 30 MIN: CPT

## 2020-09-21 NOTE — COUNSELING
[Fall prevention counseling provided] : Fall prevention counseling provided [Adequate lighting] : Adequate lighting [No throw rugs] : No throw rugs [Use proper foot wear] : Use proper foot wear [Behavioral health counseling provided] : Behavioral health counseling provided [Sleep ___ hours/day] : Sleep [unfilled] hours/day [Engage in a relaxing activity] : Engage in a relaxing activity [Plan in advance] : Plan in advance [AUDIT-C Screening administered and reviewed] : AUDIT-C Screening administered and reviewed

## 2020-09-23 NOTE — HEALTH RISK ASSESSMENT
[Yes] : Yes [4 or more  times a week (4 pts)] : 4 or more  times a week (4 points) [1 or 2 (0 pts)] : 1 or 2 (0 points) [Never (0 pts)] : Never (0 points) [No] : In the past 12 months have you used drugs other than those required for medical reasons? No [No falls in past year] : Patient reported no falls in the past year [0] : 2) Feeling down, depressed, or hopeless: Not at all (0) [] : No [YearQuit] : 2005 [Audit-CScore] : 4 [YIS0Yzldj] : 0

## 2020-09-23 NOTE — HISTORY OF PRESENT ILLNESS
[FreeTextEntry1] : Please see HPI. [de-identified] : Is a 70-year-old gentleman who presents today for follow-up and also for disease management.  Patient will be having in the near future extraction of [id number].  Medical history is significant for ischemic heart disease, hypertension, carotid stenosis status post carotid endarterectomy, CVA without residuals, hyperlipidemia and history of colon CA.\par \par Patient is awake alert and oriented x3 in no acute distress calm and cooperative.

## 2020-09-23 NOTE — ASSESSMENT
[FreeTextEntry1] : Assessment and plan:\par \par 1.  Patient will be having extraction of [id number] which is no longer needed there is no medical contraindication for the proposed procedure and there is no medical contraindication to hold anticoagulation for 5 days prior to the procedure.\par \par 2.  Hypertension patient does have blood pressure which is slightly difficult to control he is presently on amlodipine 5 mg twice a day, metoprolol tartrate 25 mg p.o. twice a day and valsartan hydrochlorothiazide daily.\par \par 3.  Ischemic heart disease patient is chest pain-free continue present medical management.\par \par 4.  Influenza and Pneumovax offered patient declines.

## 2020-10-03 ENCOUNTER — OUTPATIENT (OUTPATIENT)
Dept: OUTPATIENT SERVICES | Facility: HOSPITAL | Age: 70
LOS: 1 days | End: 2020-10-03
Payer: MEDICARE

## 2020-10-03 DIAGNOSIS — Z98.89 OTHER SPECIFIED POSTPROCEDURAL STATES: Chronic | ICD-10-CM

## 2020-10-03 DIAGNOSIS — Z11.59 ENCOUNTER FOR SCREENING FOR OTHER VIRAL DISEASES: ICD-10-CM

## 2020-10-03 DIAGNOSIS — C18.9 MALIGNANT NEOPLASM OF COLON, UNSPECIFIED: Chronic | ICD-10-CM

## 2020-10-03 DIAGNOSIS — Z90.49 ACQUIRED ABSENCE OF OTHER SPECIFIED PARTS OF DIGESTIVE TRACT: Chronic | ICD-10-CM

## 2020-10-03 PROCEDURE — U0003: CPT

## 2020-10-04 DIAGNOSIS — Z11.59 ENCOUNTER FOR SCREENING FOR OTHER VIRAL DISEASES: ICD-10-CM

## 2020-10-05 RX ORDER — LOSARTAN/HYDROCHLOROTHIAZIDE 100MG-25MG
1 TABLET ORAL
Qty: 0 | Refills: 0 | DISCHARGE

## 2020-10-05 RX ORDER — AMLODIPINE BESYLATE 2.5 MG/1
1 TABLET ORAL
Qty: 0 | Refills: 0 | DISCHARGE

## 2020-10-05 NOTE — ASU PATIENT PROFILE, ADULT - PMH
Arthropathy  left hip > right hip  Atherosclerosis    Carotid artery occlusion  left side in November 2013  Left CEA 11/2013  Cataract of left eye    Clostridium difficile infection  on short term vancomyacin  Colon cancer  had surgery in March 2014 with mets to liver-- received chemotherapy  CVA (cerebral vascular accident)  2005 -- wife and patient state no residual  Depression  sts was depressed "I lost my brother while going to this"  DJD (degenerative joint disease)    Hyperlipidemia    Hypertension    Liver cancer  METS from the colon -- has received chemotherapy  Lumbar stenosis    Lung metastasis    Osteoarthritis    Reflux    Snoring  DREW precautions -- responds affirmatively to STOP BANG questionnaire -- admits to loud snoring; age > 50; h/o htn; gender, male; h/o htn

## 2020-10-05 NOTE — ASU PATIENT PROFILE, ADULT - PSH
Colon cancer  [facility] 2014 for chemotherapy due to colon cancer with METS to liver  Colon cancer  diagnosed in March 2014 with subsequent colon cancer followed with chemotherapy  H/O resection of liver  2015  H/O skin graft  1984  S/P carotid endarterectomy  11/2013  left side

## 2020-10-06 ENCOUNTER — OUTPATIENT (OUTPATIENT)
Dept: INPATIENT UNIT | Facility: HOSPITAL | Age: 70
LOS: 1 days | Discharge: HOME | End: 2020-10-06
Payer: MEDICARE

## 2020-10-06 ENCOUNTER — RESULT REVIEW (OUTPATIENT)
Age: 70
End: 2020-10-06

## 2020-10-06 VITALS
RESPIRATION RATE: 17 BRPM | HEART RATE: 89 BPM | DIASTOLIC BLOOD PRESSURE: 67 MMHG | TEMPERATURE: 97 F | SYSTOLIC BLOOD PRESSURE: 153 MMHG | OXYGEN SATURATION: 99 %

## 2020-10-06 VITALS
DIASTOLIC BLOOD PRESSURE: 59 MMHG | SYSTOLIC BLOOD PRESSURE: 157 MMHG | HEIGHT: 66 IN | OXYGEN SATURATION: 100 % | HEART RATE: 59 BPM | TEMPERATURE: 98 F | WEIGHT: 139.99 LBS | RESPIRATION RATE: 16 BRPM

## 2020-10-06 DIAGNOSIS — Z98.89 OTHER SPECIFIED POSTPROCEDURAL STATES: Chronic | ICD-10-CM

## 2020-10-06 DIAGNOSIS — Z90.49 ACQUIRED ABSENCE OF OTHER SPECIFIED PARTS OF DIGESTIVE TRACT: Chronic | ICD-10-CM

## 2020-10-06 DIAGNOSIS — C18.9 MALIGNANT NEOPLASM OF COLON, UNSPECIFIED: Chronic | ICD-10-CM

## 2020-10-06 DIAGNOSIS — C18.9 MALIGNANT NEOPLASM OF COLON, UNSPECIFIED: ICD-10-CM

## 2020-10-06 LAB
ANION GAP SERPL CALC-SCNC: 5 MMOL/L — SIGNIFICANT CHANGE UP (ref 5–17)
BUN SERPL-MCNC: 19 MG/DL — SIGNIFICANT CHANGE UP (ref 7–23)
CALCIUM SERPL-MCNC: 9 MG/DL — SIGNIFICANT CHANGE UP (ref 8.5–10.1)
CHLORIDE SERPL-SCNC: 110 MMOL/L — HIGH (ref 96–108)
CO2 SERPL-SCNC: 27 MMOL/L — SIGNIFICANT CHANGE UP (ref 22–31)
CREAT SERPL-MCNC: 1.01 MG/DL — SIGNIFICANT CHANGE UP (ref 0.5–1.3)
GLUCOSE SERPL-MCNC: 111 MG/DL — HIGH (ref 70–99)
HCT VFR BLD CALC: 43.2 % — SIGNIFICANT CHANGE UP (ref 39–50)
HGB BLD-MCNC: 14.1 G/DL — SIGNIFICANT CHANGE UP (ref 13–17)
INR BLD: 1.1 RATIO — SIGNIFICANT CHANGE UP (ref 0.88–1.16)
MCHC RBC-ENTMCNC: 32 PG — SIGNIFICANT CHANGE UP (ref 27–34)
MCHC RBC-ENTMCNC: 32.6 GM/DL — SIGNIFICANT CHANGE UP (ref 32–36)
MCV RBC AUTO: 98.2 FL — SIGNIFICANT CHANGE UP (ref 80–100)
PLATELET # BLD AUTO: 186 K/UL — SIGNIFICANT CHANGE UP (ref 150–400)
POTASSIUM SERPL-MCNC: 4 MMOL/L — SIGNIFICANT CHANGE UP (ref 3.5–5.3)
POTASSIUM SERPL-SCNC: 4 MMOL/L — SIGNIFICANT CHANGE UP (ref 3.5–5.3)
PROTHROM AB SERPL-ACNC: 12.8 SEC — SIGNIFICANT CHANGE UP (ref 10.6–13.6)
RBC # BLD: 4.4 M/UL — SIGNIFICANT CHANGE UP (ref 4.2–5.8)
RBC # FLD: 13.1 % — SIGNIFICANT CHANGE UP (ref 10.3–14.5)
SODIUM SERPL-SCNC: 142 MMOL/L — SIGNIFICANT CHANGE UP (ref 135–145)
WBC # BLD: 6.45 K/UL — SIGNIFICANT CHANGE UP (ref 3.8–10.5)
WBC # FLD AUTO: 6.45 K/UL — SIGNIFICANT CHANGE UP (ref 3.8–10.5)

## 2020-10-06 PROCEDURE — 80048 BASIC METABOLIC PNL TOTAL CA: CPT

## 2020-10-06 PROCEDURE — 36415 COLL VENOUS BLD VENIPUNCTURE: CPT

## 2020-10-06 PROCEDURE — 85027 COMPLETE CBC AUTOMATED: CPT

## 2020-10-06 PROCEDURE — 93005 ELECTROCARDIOGRAM TRACING: CPT | Mod: XU

## 2020-10-06 PROCEDURE — 85610 PROTHROMBIN TIME: CPT

## 2020-10-06 PROCEDURE — 36590 REMOVAL TUNNELED CV CATH: CPT

## 2020-10-06 PROCEDURE — 93010 ELECTROCARDIOGRAM REPORT: CPT

## 2020-10-06 RX ORDER — CLOPIDOGREL BISULFATE 75 MG/1
1 TABLET, FILM COATED ORAL
Qty: 0 | Refills: 0 | DISCHARGE

## 2020-10-07 DIAGNOSIS — C18.9 MALIGNANT NEOPLASM OF COLON, UNSPECIFIED: ICD-10-CM

## 2020-10-12 DIAGNOSIS — I10 ESSENTIAL (PRIMARY) HYPERTENSION: ICD-10-CM

## 2020-10-12 DIAGNOSIS — Z86.73 PERSONAL HISTORY OF TRANSIENT ISCHEMIC ATTACK (TIA), AND CEREBRAL INFARCTION WITHOUT RESIDUAL DEFICITS: ICD-10-CM

## 2020-10-12 DIAGNOSIS — Z45.2 ENCOUNTER FOR ADJUSTMENT AND MANAGEMENT OF VASCULAR ACCESS DEVICE: ICD-10-CM

## 2020-10-12 DIAGNOSIS — Z79.82 LONG TERM (CURRENT) USE OF ASPIRIN: ICD-10-CM

## 2020-10-12 DIAGNOSIS — Z85.038 PERSONAL HISTORY OF OTHER MALIGNANT NEOPLASM OF LARGE INTESTINE: ICD-10-CM

## 2020-10-12 DIAGNOSIS — E78.5 HYPERLIPIDEMIA, UNSPECIFIED: ICD-10-CM

## 2020-10-22 DIAGNOSIS — Z82.49 FAMILY HISTORY OF ISCHEMIC HEART DISEASE AND OTHER DISEASES OF THE CIRCULATORY SYSTEM: ICD-10-CM

## 2020-11-29 ENCOUNTER — RX RENEWAL (OUTPATIENT)
Age: 70
End: 2020-11-29

## 2020-12-13 ENCOUNTER — RX RENEWAL (OUTPATIENT)
Age: 70
End: 2020-12-13

## 2020-12-21 ENCOUNTER — APPOINTMENT (OUTPATIENT)
Dept: FAMILY MEDICINE | Facility: CLINIC | Age: 70
End: 2020-12-21
Payer: MEDICARE

## 2020-12-21 VITALS
BODY MASS INDEX: 23.73 KG/M2 | HEIGHT: 64 IN | SYSTOLIC BLOOD PRESSURE: 142 MMHG | TEMPERATURE: 98.3 F | DIASTOLIC BLOOD PRESSURE: 62 MMHG | WEIGHT: 139 LBS | OXYGEN SATURATION: 96 % | RESPIRATION RATE: 16 BRPM | HEART RATE: 66 BPM

## 2020-12-21 DIAGNOSIS — Z00.00 ENCOUNTER FOR GENERAL ADULT MEDICAL EXAMINATION W/OUT ABNORMAL FINDINGS: ICD-10-CM

## 2020-12-21 PROBLEM — C78.00 SECONDARY MALIGNANT NEOPLASM OF UNSPECIFIED LUNG: Chronic | Status: ACTIVE | Noted: 2020-10-05

## 2020-12-21 PROBLEM — H26.9 UNSPECIFIED CATARACT: Chronic | Status: ACTIVE | Noted: 2020-10-05

## 2020-12-21 PROCEDURE — 36415 COLL VENOUS BLD VENIPUNCTURE: CPT

## 2020-12-21 PROCEDURE — 99214 OFFICE O/P EST MOD 30 MIN: CPT | Mod: 25

## 2020-12-21 RX ORDER — CLOPIDOGREL BISULFATE 75 MG/1
75 TABLET, FILM COATED ORAL DAILY
Qty: 30 | Refills: 3 | Status: COMPLETED | COMMUNITY
End: 2020-12-21

## 2020-12-21 NOTE — PHYSICAL EXAM
Results   PAP TEST WITH HIGH RISK HPV   2017 12:01 AM  * pap wnl and HPV (-), 2017  -   PAP WITH HIGH RISK HPV  Name: CLEO MOROCHO            MRN:     [id number]    :  1981                     Visit#:  [id number]                            Gynecologic Cytology Consultation Report        Client:  [facility] [address]        Date Specimen Collected: 17            Accession #:  [id number]    Date Specimen Received:  17            Requisition    #:[id number]    Date Reported:           2017 08:57    Location:     [facility]                            * Addendum Present *    ______________________________________________________________________________    Cytologic Interpretation :        Negative for intraepithelial lesion or malignancy.          Satisfactory for evaluation.  Presence of endocervical/transformation zone    component.            CAITLIN Fields(ASCP)    ** Electronic Signature (AXF) 2017   08:57 **        Educational note:  The Pap test is a screening test with a well-recognized    false negative rate.  The best means available to lower the false negative    rate and to detect early cervical lesions is a Pap test at regular intervals.     All ThinPrep Paps will be reviewed with the aid of the ThinPrep Imaging    System, unless otherwise specified.        ______________________________________________________________________________    Clinical Information:    Menstrual Hx:  Regular Menses    Other Clinical Conditions:Pap source: Cervical    REASON FOR COLLECTION::LOW RISK-;ENCOUNTER FOR SCREENING FOR MALIGNANT    NEOPLASM OF CERVIX Z12.4    SOURCE::CERVICAL        Specimen(s) Submitted:     PAP with High Risk HPV        Procedures/Addenda:    HPV, High Risk_IL:    Date Ordered:     2017     Date Reported:2017        Interpretation    Aptimae HPV HIGH RISK (TYPES 16,18,31,33,35,39,45,51,52,56,58,59,66,68):     NEGATIVE        The APTIMA HPV Assay is an FDA approved in vitro nucleic acid amplification    test for the qualitative detection of E6/E7 viral messenger RNA (mRNA) from 14    high-risk types of human papillomavirus (HPV) in cervical specimens. The    high-risk HPV types detected by the assay include: 16, 18, 31, 33, 35, 39, 45,    51, 52, 56, 58, 59, 66, and 68. The Aptima HPV Assay does not discriminate    between the 14 high-risk types. Cervical specimens in the Thin Prep Pap Test    vials containing PreservCyt Solution and collected with broom-type or    cytobrush/spatula collection devices may be tested with this assay using the    [employer] System.         The APTIMA HPV Assay is intended to screen women 21 years and older with    atypical squamous cells of undetermined significance (ASC-US) cervical    cytology results to determine the need for referral to colposcopy. Test    results are not intended to prevent women from proceeding to colposcopy.         In women 30 years and older, the above assay can be used with cervical    cytology to adjunctively screen to assess the presence or absence of high-risk    HPV types. This information, with the physician's assessment of cytology    history, other risk factors, and guidelines, may be used to guide patient    management.                                         ** Electronic Signature ** () 1/4/2017 13:58 **                ICD Codes:     Z00.00        Fee Codes:     A: [id number]     HPV_IL: [id number]        Performing Lab Location (Unless otherwise specified):    [facility] [address] [address]  Flag: ANA Yanes,  Your pap test is normal and your HPV test is negative.  This puts you at lower risk for cervical cancer.  If there is no change in your gynecological health your pap test can be repeated in 2-3 years.  Sincerely,  Dr. Ro Larose.   [No Acute Distress] : no acute distress [Well Nourished] : well nourished [Well Developed] : well developed [Well-Appearing] : well-appearing [Normal Sclera/Conjunctiva] : normal sclera/conjunctiva [PERRL] : pupils equal round and reactive to light [EOMI] : extraocular movements intact [Normal Outer Ear/Nose] : the outer ears and nose were normal in appearance [Normal Oropharynx] : the oropharynx was normal [No JVD] : no jugular venous distention [No Lymphadenopathy] : no lymphadenopathy [Supple] : supple [Thyroid Normal, No Nodules] : the thyroid was normal and there were no nodules present [No Respiratory Distress] : no respiratory distress  [No Accessory Muscle Use] : no accessory muscle use [Clear to Auscultation] : lungs were clear to auscultation bilaterally [Normal Rate] : normal rate  [Regular Rhythm] : with a regular rhythm [Normal S1, S2] : normal S1 and S2 [No Murmur] : no murmur heard [No Carotid Bruits] : no carotid bruits [No Abdominal Bruit] : a ~M bruit was not heard ~T in the abdomen [No Varicosities] : no varicosities [Pedal Pulses Present] : the pedal pulses are present [No Edema] : there was no peripheral edema [No Palpable Aorta] : no palpable aorta [No Extremity Clubbing/Cyanosis] : no extremity clubbing/cyanosis [Soft] : abdomen soft [Non Tender] : non-tender [Non-distended] : non-distended [No Masses] : no abdominal mass palpated [No HSM] : no HSM [Normal Bowel Sounds] : normal bowel sounds [Normal Posterior Cervical Nodes] : no posterior cervical lymphadenopathy [Normal Anterior Cervical Nodes] : no anterior cervical lymphadenopathy [No CVA Tenderness] : no CVA  tenderness [No Spinal Tenderness] : no spinal tenderness [No Joint Swelling] : no joint swelling [Grossly Normal Strength/Tone] : grossly normal strength/tone [No Rash] : no rash [Coordination Grossly Intact] : coordination grossly intact [No Focal Deficits] : no focal deficits [Normal Gait] : normal gait [Normal Affect] : the affect was normal [Normal Insight/Judgement] : insight and judgment were intact

## 2020-12-21 NOTE — HISTORY OF PRESENT ILLNESS
[FreeTextEntry1] : Please see HPI [de-identified] : Rodríguez is a 70-year-old gentleman who presents today for follow-up and disease management.  Patient states that he is in his usual state of health without complaints he denies any chest pain shortness of breath nausea or vomiting.\par \par We will be addressing ischemic heart disease, hypertension, history of cerebrovascular accident without residuals, hyperlipidemia and metastatic colon cancer to liver and lungs status post resection status post chemotherapy patient recently had port removed and is followed very closely by hematology oncology patient presently in remission.  Patient completed chemotherapy in 2015.

## 2020-12-21 NOTE — ASSESSMENT
[FreeTextEntry1] : Assessment and plan:\par \par 1.  History of CVA without residuals patient doing well continue present medical management.\par \par 2.  Hypertension patient does have blood pressure which is slightly difficult to control he is presently on amlodipine 5 mg twice a day, metoprolol tartrate 25 mg p.o. twice a day and valsartan hydrochlorothiazide daily.\par \par 3.  Ischemic heart disease patient is chest pain-free continue present medical management.\par \par 4.  Influenza and Pneumovax offered patient declines.\par \par 5.  Hyperlipidemia continue low-fat low-cholesterol diet\par \par 6.  History of metastatic colon cancer to liver and lung status post resection status post chemotherapy last session 2015 patient has been in remission and Amhptv-j-[id number] has been discontinued.\par \par 7.  At this visit comprehensive blood work drawn by examiner.

## 2020-12-21 NOTE — HEALTH RISK ASSESSMENT
[] : No [Yes] : Yes [4 or more  times a week (4 pts)] : 4 or more  times a week (4 points) [1 or 2 (0 pts)] : 1 or 2 (0 points) [Never (0 pts)] : Never (0 points) [No] : In the past 12 months have you used drugs other than those required for medical reasons? No [No falls in past year] : Patient reported no falls in the past year [0] : 2) Feeling down, depressed, or hopeless: Not at all (0) [Audit-CScore] : 4 [[id number]] : 0

## 2020-12-22 LAB
24R-OH-CALCIDIOL SERPL-MCNC: 31 PG/ML
25(OH)D3 SERPL-MCNC: 59.1 NG/ML
ALBUMIN SERPL ELPH-MCNC: 4.5 G/DL
ALP BLD-CCNC: 84 U/L
ALT SERPL-CCNC: 24 U/L
ANION GAP SERPL CALC-SCNC: 10 MMOL/L
AST SERPL-CCNC: 25 U/L
BASOPHILS # BLD AUTO: 0.02 K/UL
BASOPHILS NFR BLD AUTO: 0.4 %
BILIRUB SERPL-MCNC: 0.4 MG/DL
BUN SERPL-MCNC: 24 MG/DL
CALCIUM SERPL-MCNC: 9.5 MG/DL
CEA SERPL-MCNC: 1.4 NG/ML
CHLORIDE SERPL-SCNC: 105 MMOL/L
CHOLEST SERPL-MCNC: 174 MG/DL
CO2 SERPL-SCNC: 25 MMOL/L
CREAT SERPL-MCNC: 1.02 MG/DL
EOSINOPHIL # BLD AUTO: 0.09 K/UL
EOSINOPHIL NFR BLD AUTO: 1.6 %
ESTIMATED AVERAGE GLUCOSE: 111 MG/DL
GLUCOSE SERPL-MCNC: 113 MG/DL
HBA1C MFR BLD HPLC: 5.5 %
HCT VFR BLD CALC: 43.1 %
HCV AB SER QL: NONREACTIVE
HCV S/CO RATIO: 0.07 S/CO
HDLC SERPL-MCNC: 70 MG/DL
HGB BLD-MCNC: 14.2 G/DL
IMM GRANULOCYTES NFR BLD AUTO: 0.4 %
LDLC SERPL CALC-MCNC: 93 MG/DL
LYMPHOCYTES # BLD AUTO: 1.53 K/UL
LYMPHOCYTES NFR BLD AUTO: 26.8 %
MAN DIFF?: NORMAL
MCHC RBC-ENTMCNC: 32.5 PG
MCHC RBC-ENTMCNC: 32.9 GM/DL
MCV RBC AUTO: 98.6 FL
MONOCYTES # BLD AUTO: 0.64 K/UL
MONOCYTES NFR BLD AUTO: 11.2 %
NEUTROPHILS # BLD AUTO: 3.41 K/UL
NEUTROPHILS NFR BLD AUTO: 59.6 %
NONHDLC SERPL-MCNC: 104 MG/DL
PLATELET # BLD AUTO: 221 K/UL
POTASSIUM SERPL-SCNC: 4.6 MMOL/L
PROT SERPL-MCNC: 6.8 G/DL
PSA SERPL-MCNC: 0.43 NG/ML
RBC # BLD: 4.37 M/UL
RBC # FLD: 12.6 %
SODIUM SERPL-SCNC: 140 MMOL/L
T4 FREE SERPL-MCNC: 1.2 NG/DL
TRIGL SERPL-MCNC: 56 MG/DL
TSH SERPL-ACNC: 1.19 UIU/ML
WBC # FLD AUTO: 5.71 K/UL

## 2021-01-05 ENCOUNTER — APPOINTMENT (OUTPATIENT)
Age: 71
End: 2021-01-05
Payer: MEDICARE

## 2021-01-05 ENCOUNTER — OUTPATIENT (OUTPATIENT)
Dept: OUTPATIENT SERVICES | Facility: HOSPITAL | Age: 71
LOS: 1 days | Discharge: HOME | End: 2021-01-05

## 2021-01-05 VITALS
RESPIRATION RATE: 16 BRPM | SYSTOLIC BLOOD PRESSURE: 127 MMHG | BODY MASS INDEX: 23.05 KG/M2 | DIASTOLIC BLOOD PRESSURE: 69 MMHG | TEMPERATURE: 98.5 F | HEART RATE: 93 BPM | WEIGHT: 135 LBS | HEIGHT: 64 IN

## 2021-01-05 DIAGNOSIS — C78.7 SECONDARY MALIGNANT NEOPLASM OF LIVER AND INTRAHEPATIC BILE DUCT: ICD-10-CM

## 2021-01-05 DIAGNOSIS — C18.9 MALIGNANT NEOPLASM OF COLON, UNSPECIFIED: ICD-10-CM

## 2021-01-05 DIAGNOSIS — C18.9 MALIGNANT NEOPLASM OF COLON, UNSPECIFIED: Chronic | ICD-10-CM

## 2021-01-05 DIAGNOSIS — Z98.89 OTHER SPECIFIED POSTPROCEDURAL STATES: Chronic | ICD-10-CM

## 2021-01-05 DIAGNOSIS — Z90.49 ACQUIRED ABSENCE OF OTHER SPECIFIED PARTS OF DIGESTIVE TRACT: Chronic | ICD-10-CM

## 2021-01-05 PROCEDURE — 99214 OFFICE O/P EST MOD 30 MIN: CPT

## 2021-01-05 NOTE — PHYSICAL EXAM
[Restricted in physically strenuous activity but ambulatory and able to carry out work of a light or sedentary nature] : Status 1- Restricted in physically strenuous activity but ambulatory and able to carry out work of a light or sedentary nature, e.g., light house work, office work [Normal] : grossly intact [de-identified] : right side scar, s/p  right carotid thrombarterectomy

## 2021-01-05 NOTE — HISTORY OF PRESENT ILLNESS
[Disease: _____________________] : Disease: [unfilled] [M: ___] : M[unfilled] [AJCC Stage: ____] : AJCC Stage: [unfilled] [de-identified] : 70 M diagnosed with colon adenocarcinoma, oligo- metastatic to liver since March 2014 and metastatic to lung since 2016. [de-identified] : Adenocarcinoma [de-identified] : CEA [FreeTextEntry1] : \par -status post left lung metastasectomy performed February 9, 2017\par -last chemotherapy with Capecitabine -  August 2015 [de-identified] : Here for follow-up, 6 months after last visit in July 2020.  Patient remains clinically in excellent condition; retired from work, but continues to be active around the house.  During coronavirus pandemic he has practice social distancing, denies recent hospitalizations or infections.  Continued follow-up with Dr. Figueroa (PCP; medication list reviewed.  Most recent CT C/A/P from 6/23/2020 showed no evidence of metastatic disease and a stable 3 mm GIOVANY lung nodule.  Had Siiwqv-v-Sywt removed in October 2020.\par \par

## 2021-01-07 ENCOUNTER — INPATIENT (INPATIENT)
Facility: HOSPITAL | Age: 71
LOS: 11 days | Discharge: HOME | DRG: 871 | End: 2021-01-19
Attending: STUDENT IN AN ORGANIZED HEALTH CARE EDUCATION/TRAINING PROGRAM | Admitting: STUDENT IN AN ORGANIZED HEALTH CARE EDUCATION/TRAINING PROGRAM
Payer: MEDICARE

## 2021-01-07 VITALS
HEART RATE: 101 BPM | RESPIRATION RATE: 16 BRPM | HEIGHT: 66 IN | SYSTOLIC BLOOD PRESSURE: 97 MMHG | WEIGHT: 147.93 LBS | OXYGEN SATURATION: 85 % | TEMPERATURE: 102 F | DIASTOLIC BLOOD PRESSURE: 53 MMHG

## 2021-01-07 DIAGNOSIS — Z98.89 OTHER SPECIFIED POSTPROCEDURAL STATES: Chronic | ICD-10-CM

## 2021-01-07 DIAGNOSIS — U07.1 COVID-19: ICD-10-CM

## 2021-01-07 DIAGNOSIS — C18.9 MALIGNANT NEOPLASM OF COLON, UNSPECIFIED: Chronic | ICD-10-CM

## 2021-01-07 DIAGNOSIS — Z90.49 ACQUIRED ABSENCE OF OTHER SPECIFIED PARTS OF DIGESTIVE TRACT: Chronic | ICD-10-CM

## 2021-01-07 LAB
ALBUMIN SERPL ELPH-MCNC: 3.3 G/DL — SIGNIFICANT CHANGE UP (ref 3.3–5)
ALBUMIN SERPL ELPH-MCNC: 3.3 G/DL — SIGNIFICANT CHANGE UP (ref 3.3–5)
ALP SERPL-CCNC: 55 U/L — SIGNIFICANT CHANGE UP (ref 40–120)
ALP SERPL-CCNC: 55 U/L — SIGNIFICANT CHANGE UP (ref 40–120)
ALT FLD-CCNC: 31 U/L — SIGNIFICANT CHANGE UP (ref 10–45)
ALT FLD-CCNC: 32 U/L — SIGNIFICANT CHANGE UP (ref 10–45)
ANION GAP SERPL CALC-SCNC: 11 MMOL/L — SIGNIFICANT CHANGE UP (ref 5–17)
APTT BLD: 29.5 SEC — SIGNIFICANT CHANGE UP (ref 27.5–35.5)
AST SERPL-CCNC: 38 U/L — SIGNIFICANT CHANGE UP (ref 10–40)
AST SERPL-CCNC: 48 U/L — HIGH (ref 10–40)
BASOPHILS # BLD AUTO: 0 K/UL — SIGNIFICANT CHANGE UP (ref 0–0.2)
BASOPHILS NFR BLD AUTO: 0 % — SIGNIFICANT CHANGE UP (ref 0–2)
BILIRUB DIRECT SERPL-MCNC: 0.2 MG/DL — SIGNIFICANT CHANGE UP (ref 0–0.2)
BILIRUB INDIRECT FLD-MCNC: 0.4 MG/DL — SIGNIFICANT CHANGE UP (ref 0.2–1)
BILIRUB SERPL-MCNC: 0.6 MG/DL — SIGNIFICANT CHANGE UP (ref 0.2–1.2)
BILIRUB SERPL-MCNC: 0.6 MG/DL — SIGNIFICANT CHANGE UP (ref 0.2–1.2)
BUN SERPL-MCNC: 40 MG/DL — HIGH (ref 7–23)
CALCIUM SERPL-MCNC: 8.9 MG/DL — SIGNIFICANT CHANGE UP (ref 8.4–10.5)
CHLORIDE SERPL-SCNC: 98 MMOL/L — SIGNIFICANT CHANGE UP (ref 96–108)
CK SERPL-CCNC: 123 U/L — SIGNIFICANT CHANGE UP (ref 30–200)
CO2 SERPL-SCNC: 24 MMOL/L — SIGNIFICANT CHANGE UP (ref 22–31)
CREAT SERPL-MCNC: 1.82 MG/DL — HIGH (ref 0.5–1.3)
CREAT SERPL-MCNC: 1.84 MG/DL — HIGH (ref 0.5–1.3)
D DIMER BLD IA.RAPID-MCNC: 172 NG/ML DDU — SIGNIFICANT CHANGE UP
EOSINOPHIL # BLD AUTO: 0.15 K/UL — SIGNIFICANT CHANGE UP (ref 0–0.5)
EOSINOPHIL NFR BLD AUTO: 2.4 % — SIGNIFICANT CHANGE UP (ref 0–6)
FIBRINOGEN PPP-MCNC: 803 MG/DL — HIGH (ref 290–520)
GLUCOSE SERPL-MCNC: 120 MG/DL — HIGH (ref 70–99)
HCT VFR BLD CALC: 41.2 % — SIGNIFICANT CHANGE UP (ref 39–50)
HGB BLD-MCNC: 13.8 G/DL — SIGNIFICANT CHANGE UP (ref 13–17)
IMM GRANULOCYTES NFR BLD AUTO: 0.3 % — SIGNIFICANT CHANGE UP (ref 0–1.5)
INR BLD: 1.29 RATIO — HIGH (ref 0.88–1.16)
LACTATE SERPL-SCNC: 2.6 MMOL/L — HIGH (ref 0.7–2)
LYMPHOCYTES # BLD AUTO: 1.21 K/UL — SIGNIFICANT CHANGE UP (ref 1–3.3)
LYMPHOCYTES # BLD AUTO: 19.1 % — SIGNIFICANT CHANGE UP (ref 13–44)
MCHC RBC-ENTMCNC: 31.6 PG — SIGNIFICANT CHANGE UP (ref 27–34)
MCHC RBC-ENTMCNC: 33.5 GM/DL — SIGNIFICANT CHANGE UP (ref 32–36)
MCV RBC AUTO: 94.3 FL — SIGNIFICANT CHANGE UP (ref 80–100)
MONOCYTES # BLD AUTO: 0.75 K/UL — SIGNIFICANT CHANGE UP (ref 0–0.9)
MONOCYTES NFR BLD AUTO: 11.8 % — SIGNIFICANT CHANGE UP (ref 2–14)
NEUTROPHILS # BLD AUTO: 4.2 K/UL — SIGNIFICANT CHANGE UP (ref 1.8–7.4)
NEUTROPHILS NFR BLD AUTO: 66.4 % — SIGNIFICANT CHANGE UP (ref 43–77)
NRBC # BLD: 0 /100 WBCS — SIGNIFICANT CHANGE UP (ref 0–0)
NT-PROBNP SERPL-SCNC: 332 PG/ML — HIGH (ref 0–300)
PLATELET # BLD AUTO: 195 K/UL — SIGNIFICANT CHANGE UP (ref 150–400)
POTASSIUM SERPL-MCNC: 3.2 MMOL/L — LOW (ref 3.5–5.3)
POTASSIUM SERPL-SCNC: 3.2 MMOL/L — LOW (ref 3.5–5.3)
PROCALCITONIN SERPL-MCNC: 0.15 NG/ML — HIGH
PROT SERPL-MCNC: 7.9 G/DL — SIGNIFICANT CHANGE UP (ref 6–8.3)
PROT SERPL-MCNC: 7.9 G/DL — SIGNIFICANT CHANGE UP (ref 6–8.3)
PROTHROM AB SERPL-ACNC: 15.4 SEC — HIGH (ref 10.6–13.6)
RBC # BLD: 4.37 M/UL — SIGNIFICANT CHANGE UP (ref 4.2–5.8)
RBC # FLD: 12.5 % — SIGNIFICANT CHANGE UP (ref 10.3–14.5)
SARS-COV-2 RNA SPEC QL NAA+PROBE: DETECTED
SODIUM SERPL-SCNC: 133 MMOL/L — LOW (ref 135–145)
TROPONIN I SERPL-MCNC: 0.02 NG/ML — SIGNIFICANT CHANGE UP (ref 0.02–0.06)
WBC # BLD: 6.33 K/UL — SIGNIFICANT CHANGE UP (ref 3.8–10.5)
WBC # FLD AUTO: 6.33 K/UL — SIGNIFICANT CHANGE UP (ref 3.8–10.5)

## 2021-01-07 PROCEDURE — 71045 X-RAY EXAM CHEST 1 VIEW: CPT | Mod: 26

## 2021-01-07 PROCEDURE — 99223 1ST HOSP IP/OBS HIGH 75: CPT | Mod: CS

## 2021-01-07 PROCEDURE — 99285 EMERGENCY DEPT VISIT HI MDM: CPT | Mod: CS

## 2021-01-07 PROCEDURE — 93010 ELECTROCARDIOGRAM REPORT: CPT

## 2021-01-07 RX ORDER — REMDESIVIR 5 MG/ML
200 INJECTION INTRAVENOUS EVERY 24 HOURS
Refills: 0 | Status: COMPLETED | OUTPATIENT
Start: 2021-01-07 | End: 2021-01-07

## 2021-01-07 RX ORDER — AMLODIPINE BESYLATE 2.5 MG/1
5 TABLET ORAL DAILY
Refills: 0 | Status: DISCONTINUED | OUTPATIENT
Start: 2021-01-07 | End: 2021-01-19

## 2021-01-07 RX ORDER — AZITHROMYCIN 500 MG/1
500 TABLET, FILM COATED ORAL ONCE
Refills: 0 | Status: COMPLETED | OUTPATIENT
Start: 2021-01-07 | End: 2021-01-07

## 2021-01-07 RX ORDER — REMDESIVIR 5 MG/ML
100 INJECTION INTRAVENOUS EVERY 24 HOURS
Refills: 0 | Status: COMPLETED | OUTPATIENT
Start: 2021-01-08 | End: 2021-01-11

## 2021-01-07 RX ORDER — POTASSIUM CHLORIDE 20 MEQ
20 PACKET (EA) ORAL ONCE
Refills: 0 | Status: COMPLETED | OUTPATIENT
Start: 2021-01-07 | End: 2021-01-07

## 2021-01-07 RX ORDER — DEXAMETHASONE 0.5 MG/5ML
6 ELIXIR ORAL DAILY
Refills: 0 | Status: COMPLETED | OUTPATIENT
Start: 2021-01-08 | End: 2021-01-16

## 2021-01-07 RX ORDER — ASPIRIN/CALCIUM CARB/MAGNESIUM 324 MG
81 TABLET ORAL DAILY
Refills: 0 | Status: DISCONTINUED | OUTPATIENT
Start: 2021-01-07 | End: 2021-01-19

## 2021-01-07 RX ORDER — ENOXAPARIN SODIUM 100 MG/ML
40 INJECTION SUBCUTANEOUS DAILY
Refills: 0 | Status: DISCONTINUED | OUTPATIENT
Start: 2021-01-07 | End: 2021-01-19

## 2021-01-07 RX ORDER — CYCLOBENZAPRINE HYDROCHLORIDE 10 MG/1
1 TABLET, FILM COATED ORAL
Qty: 0 | Refills: 0 | DISCHARGE

## 2021-01-07 RX ORDER — DEXAMETHASONE 0.5 MG/5ML
6 ELIXIR ORAL ONCE
Refills: 0 | Status: COMPLETED | OUTPATIENT
Start: 2021-01-07 | End: 2021-01-07

## 2021-01-07 RX ORDER — SODIUM CHLORIDE 9 MG/ML
500 INJECTION INTRAMUSCULAR; INTRAVENOUS; SUBCUTANEOUS ONCE
Refills: 0 | Status: COMPLETED | OUTPATIENT
Start: 2021-01-07 | End: 2021-01-07

## 2021-01-07 RX ORDER — ACETAMINOPHEN 500 MG
975 TABLET ORAL ONCE
Refills: 0 | Status: COMPLETED | OUTPATIENT
Start: 2021-01-07 | End: 2021-01-07

## 2021-01-07 RX ORDER — ACETAMINOPHEN 500 MG
650 TABLET ORAL EVERY 6 HOURS
Refills: 0 | Status: DISCONTINUED | OUTPATIENT
Start: 2021-01-07 | End: 2021-01-19

## 2021-01-07 RX ORDER — SODIUM CHLORIDE 9 MG/ML
1000 INJECTION INTRAMUSCULAR; INTRAVENOUS; SUBCUTANEOUS
Refills: 0 | Status: DISCONTINUED | OUTPATIENT
Start: 2021-01-07 | End: 2021-01-12

## 2021-01-07 RX ORDER — METOPROLOL TARTRATE 50 MG
25 TABLET ORAL
Refills: 0 | Status: DISCONTINUED | OUTPATIENT
Start: 2021-01-07 | End: 2021-01-19

## 2021-01-07 RX ORDER — REMDESIVIR 5 MG/ML
INJECTION INTRAVENOUS
Refills: 0 | Status: COMPLETED | OUTPATIENT
Start: 2021-01-07 | End: 2021-01-11

## 2021-01-07 RX ORDER — PYRIDOXINE HCL (VITAMIN B6) 100 MG
1 TABLET ORAL
Qty: 0 | Refills: 0 | DISCHARGE

## 2021-01-07 RX ORDER — INFLUENZA VIRUS VACCINE 15; 15; 15; 15 UG/.5ML; UG/.5ML; UG/.5ML; UG/.5ML
0.5 SUSPENSION INTRAMUSCULAR ONCE
Refills: 0 | Status: DISCONTINUED | OUTPATIENT
Start: 2021-01-07 | End: 2021-01-19

## 2021-01-07 RX ORDER — HYDROCHLOROTHIAZIDE 25 MG
25 TABLET ORAL DAILY
Refills: 0 | Status: DISCONTINUED | OUTPATIENT
Start: 2021-01-07 | End: 2021-01-08

## 2021-01-07 RX ADMIN — Medication 20 MILLIEQUIVALENT(S): at 15:53

## 2021-01-07 RX ADMIN — Medication 6 MILLIGRAM(S): at 16:59

## 2021-01-07 RX ADMIN — REMDESIVIR 500 MILLIGRAM(S): 5 INJECTION INTRAVENOUS at 19:59

## 2021-01-07 RX ADMIN — SODIUM CHLORIDE 500 MILLILITER(S): 9 INJECTION INTRAMUSCULAR; INTRAVENOUS; SUBCUTANEOUS at 15:10

## 2021-01-07 RX ADMIN — SODIUM CHLORIDE 500 MILLILITER(S): 9 INJECTION INTRAMUSCULAR; INTRAVENOUS; SUBCUTANEOUS at 15:53

## 2021-01-07 RX ADMIN — Medication 975 MILLIGRAM(S): at 14:47

## 2021-01-07 RX ADMIN — AZITHROMYCIN 255 MILLIGRAM(S): 500 TABLET, FILM COATED ORAL at 16:59

## 2021-01-07 NOTE — H&P ADULT - NSHPPHYSICALEXAM_GEN_ALL_CORE
T(C): 36.9 (01-07-21 @ 16:45), Max: 38.9 (01-07-21 @ 14:15)  HR: 79 (01-07-21 @ 16:45) (72 - 101)  BP: 107/46 (01-07-21 @ 16:45) (97/53 - 123/52)  RR: 24 (01-07-21 @ 16:45) (16 - 26)  SpO2: 95% (01-07-21 @ 16:45) (85% - 98%)  Wt(kg): --Vital Signs Last 24 Hrs  T(C): 36.9 (07 Jan 2021 16:45), Max: 38.9 (07 Jan 2021 14:15)  T(F): 98.5 (07 Jan 2021 16:45), Max: 102 (07 Jan 2021 14:15)  HR: 79 (07 Jan 2021 16:45) (72 - 101)  BP: 107/46 (07 Jan 2021 16:45) (97/53 - 123/52)  BP(mean): 62 (07 Jan 2021 16:45) (56 - 69)  RR: 24 (07 Jan 2021 16:45) (16 - 26)  SpO2: 95% (07 Jan 2021 16:45) (85% - 98%)    PHYSICAL EXAM:  GENERAL: elderly, NAD  HEAD:  Atraumatic, Normocephalic  EYES: EOMI, PERRLA, conjunctiva and sclera clear  ENMT: No tonsillar erythema, exudates, or enlargement; Moist mucous membranes, Good dentition, No lesions  NECK: Supple, No JVD, Normal thyroid  NERVOUS SYSTEM:  Alert & Oriented X3, Good concentration; Motor Strength 4/5 B/L upper and lower extremities  CHEST/LUNG: mildly labored breathing on RA, improved with NC. upper airways CTA b/l, +rales mid lung fields, diminished at bases  HEART: Regular rate and rhythm; No murmurs, rubs, or gallops  ABDOMEN: Soft, Nontender, Nondistended; Bowel sounds present  EXTREMITIES:  2+ Peripheral Pulses, No clubbing, cyanosis, or edema  LYMPH: No lymphadenopathy noted  SKIN: No rashes or lesions

## 2021-01-07 NOTE — ED ADULT NURSE REASSESSMENT NOTE - NS ED NURSE REASSESS COMMENT FT1
Pt jean-paul Rivera  [phone]
Plan for admission. Pt awaiting bed assignment. Pt and family made aware as to plan of care and rationale. Pt on bedside monitor, call bell within reach, will continue to monitor.

## 2021-01-07 NOTE — ED PROVIDER NOTE - ATTENDING CONTRIBUTION TO CARE
71 y/o M with PMH of lung cancer, colon cancer with mets, HLD, CVA, HTN presents to the ED with c/o fever x today, decreased appetite, fatigue, bodyaches x 1 week. Pt denies CP or SOB, abd pain, n/v/d, urinary symptoms, cough or sick contacts. On arrival Spo2 is 85% on RA, pt placed on 4L NC with improvement. Pt is not on home O2. PE as noted above. F-102, spo2- 85%on RA, +tachypnea. CXR results reviewed-- b/l infiltrates c/w COVID. labs ordered. Sepsis IVF held, given pt likely has Covid pneumonia. labs reviewed. pt will be admitted. Spoke with Dr. mccoy, decadron, azithromycin and remdesivir ordered  Dr. Martinez:  I have reviewed and discussed with the PA/ resident the case specifics, including the history, physical assessment, evaluation, conclusion, laboratory results, and medical plan. I agree with the contents, and conclusions. I have personally examined, and interviewed the patient.

## 2021-01-07 NOTE — PATIENT PROFILE ADULT - HAS THE PATIENT RECEIVED THE INFLUENZA VACCINE THIS SEASON?

## 2021-01-07 NOTE — H&P ADULT - NSHPSOCIALHISTORY_GEN_ALL_CORE
Lives in home with wife. Previously ambulates without assistance, independent with ADLs and IADLs. not on home oxygen. former smoker. denies EtOH or illicit drug use

## 2021-01-07 NOTE — H&P ADULT - HISTORY OF PRESENT ILLNESS
69 y/o M PMH colon adenocarcinoma, oligo-metastatic to liver (3/2014), and metastatic to lung (2016), s/p lung metastaectomy (2/2017), last chemo with Capecitabine (8/2015), HTN, HLD,  c/o SOB, progressive BRYAN, fatigue, malaise, poor PO x 2-3 days. Live at home with wife, unsure of covid positive contacts. ED course significant for hypoxia O2Sat 85% on RA at rest improved to 92-93% on 4L NC. Denies cp, palpitations, nausea, vomiting, abd pain, numbness/tingling in the extremities.  Fam hx - unknown

## 2021-01-07 NOTE — ED PROVIDER NOTE - OBJECTIVE STATEMENT
#[id number]  71 y/o M with PMH of lung cancer, colon cancer with mets, HLD, CVA, HTN presents to the ED with c/o fever x today, decreased appetite, fatigue, bodyaches x 1 week. Pt denies CP or SOB, abd pain, n/v/d, urinary symptoms, cough or sick contacts. On arrival Spo2 is 85% on RA, pt placed on 4L NC with improvement. Pt is not on home O2

## 2021-01-07 NOTE — PROVIDER CONTACT NOTE (CRITICAL VALUE NOTIFICATION) - ACTION/TREATMENT ORDERED:
DIEGO Ribera made aware as to pt lactate level. Pt receiving 500mL fluid bolus due to unknown covid status. Pt remains on bedside monitor, will continue to monitor. As per DIEGO Ribera, lactate level not to be repeated.

## 2021-01-07 NOTE — ED PROVIDER NOTE - PSH
Colon cancer  [facility] 2014 for chemotherapy due to colon cancer with METS to liver  Colon cancer  diagnosed in March 2014 with subsequent colon cancer followed with chemotherapy  H/O resection of liver  2015  H/O skin graft  1984  S/P carotid endarterectomy  11/2013  left side

## 2021-01-07 NOTE — ED ADULT TRIAGE NOTE - CHIEF COMPLAINT QUOTE
Patient with chills and decreased po intake x approx 1 week Patient with chills, SOB and decreased po intake x approx 1 week

## 2021-01-07 NOTE — H&P ADULT - NSHPLABSRESULTS_GEN_ALL_CORE
LABS:                        13.8   6.33  )-----------( 195      ( 07 Jan 2021 14:30 )             41.2     01-07    133<L>  |  98  |  40<H>  ----------------------------<  120<H>  3.2<L>   |  24  |  1.82<H>    Ca    8.9      07 Jan 2021 14:30    TPro  7.9  /  Alb  3.3  /  TBili  0.6  /  DBili  0.2  /  AST  38  /  ALT  32  /  AlkPhos  55  01-07    PT/INR - ( 07 Jan 2021 14:30 )   PT: 15.4 sec;   INR: 1.29 ratio         PTT - ( 07 Jan 2021 14:30 )  PTT:29.5 sec    CAPILLARY BLOOD GLUCOSE    RADIOLOGY & ADDITIONAL TESTS:   < from: Xray Chest 1 View AP/PA (01.07.21 @ 14:29) >    INTERPRETATION:  AP chest on January 7, 2021 at 2:18 PM. Patient has fever. 2 images. There is history of left upper lobe wedge resection. Patient has history of carotid stenosis and hypertension. There is history of colon cancer with metastatic involvement of liver and lung.    Clips in the neck again seen. Heart magnified by technique.    There are scattered mid and lower lung field infiltrates increased from CAT scan of June23, 2020. Covid virus is in the differential.    IMPRESSION: Bilateral lung infiltrates suspect for Covid virus.    < end of copied text >    Consultant(s) Notes Reviewed:  [x ] YES  [ ] NO  Care Discussed with Consultants/Other Providers [ x] YES  [ ] NO  Imaging Personally Reviewed:  [x ] YES  [ ] NO

## 2021-01-07 NOTE — ED ADULT NURSE NOTE - PSH
Colon cancer  [facility] 2014 for chemotherapy due to colon cancer with METS to liver  Colon cancer  diagnosed in March 2014 with subsequent colon cancer followed with chemotherapy  H/O resection of liver  2015  H/O skin graft  1984  S/P carotid endarterectomy  11/2013  left side

## 2021-01-07 NOTE — ED ADULT NURSE NOTE - OBJECTIVE STATEMENT
69 y/o M PMH HTN ambulatory to ED a&ox3 c/o fever/chills, SOB, decreased PO intake x1 week. Pt presents to ED febriles, tachypneic with O2sat 85% on RA. O2 nasal cannula with improvement. SOB worse with exertion. Pt denies cp, cough, sick contacts, n/v/d.

## 2021-01-07 NOTE — ED PROVIDER NOTE - CLINICAL SUMMARY MEDICAL DECISION MAKING FREE TEXT BOX
71 y/o M with PMH of lung cancer, colon cancer with mets, HLD, CVA, HTN presents to the ED with c/o fever x today, decreased appetite, fatigue, bodyaches x 1 week. Pt denies CP or SOB, abd pain, n/v/d, urinary symptoms, cough or sick contacts. On arrival Spo2 is 85% on RA, pt placed on 4L NC with improvement. Pt is not on home O2. PE as noted above. F-102, spo2- 85%on RA, +tachypnea. CXR results reviewed-- b/l infiltrates c/w COVID. labs ordered. Sepsis IVF held, given pt likely has Covid pneumonia. labs pending for admission 71 y/o M with PMH of lung cancer, colon cancer with mets, HLD, CVA, HTN presents to the ED with c/o fever x today, decreased appetite, fatigue, bodyaches x 1 week. Pt denies CP or SOB, abd pain, n/v/d, urinary symptoms, cough or sick contacts. On arrival Spo2 is 85% on RA, pt placed on 4L NC with improvement. Pt is not on home O2. PE as noted above. F-102, spo2- 85%on RA, +tachypnea. CXR results reviewed-- b/l infiltrates c/w COVID. labs ordered. Sepsis IVF held, given pt likely has Covid pneumonia. labs reviewed. pt will be admitted. Spoke with Dr. mccoy, decadron, azithromycin and remdesivir ordered

## 2021-01-07 NOTE — H&P ADULT - NSHPREVIEWOFSYSTEMS_GEN_ALL_CORE
REVIEW OF SYSTEMS:  CONSTITUTIONAL: +malaise, weakness, fever, fatigue, poor PO  EYES: No eye pain, visual disturbances, or discharge  ENMT:  No difficulty hearing, tinnitus, vertigo; No sinus or throat pain  NECK: No pain or stiffness  RESPIRATORY: +dry cough, SOB, BRYAN  CARDIOVASCULAR: No chest pain, palpitations, dizziness, or leg swelling  GASTROINTESTINAL: No abdominal or epigastric pain. No nausea, vomiting, or hematemesis; No diarrhea or constipation. No melena or hematochezia.  GENITOURINARY: No dysuria, frequency, hematuria, or incontinence  NEUROLOGICAL: +mild headache. No memory loss, loss of strength, numbness, or tremors  SKIN: No itching, burning, rashes, or lesions   LYMPH NODES: No enlarged glands  ENDOCRINE: No heat or cold intolerance; No hair loss  MUSCULOSKELETAL: No joint pain or swelling; No muscle, back, or extremity pain  PSYCHIATRIC: No depression, anxiety, mood swings, or difficulty sleeping  HEME/LYMPH: No easy bruising, or bleeding gums  ALLERGY AND IMMUNOLOGIC: No hives or eczema    ALL ROS REVIEWED AND NORMAL EXCEPT AS STATED ABOVE 16

## 2021-01-07 NOTE — H&P ADULT - ASSESSMENT
69 y/o M PMH colon adenocarcinoma, oligo-metastatic to liver (3/2014), and metastatic to lung (2016), s/p lung metastaectomy (2/2017), last chemo with Capecitabine (8/2015), HTN, HLD,  c/o SOB, progressive BRYAN, fatigue, malaise, poor PO x 2-3 days admitted for acute hypoxic respiratory failure due to COVID-19 and acute kidney injury and electrolyte abnormalities.    # Acute Hypoxic Respiratory Failure due to COVID-19 infection  - COVID-19 PCR positive 1/7/21. CXR with mid-lower lung infiltrates c/w COVID infection  - Continue isolation precautions  - Monitor respiratory status closely. Oxygen supplementation as needed to maintain saturation >90%  - Remdesivir 5 day course per protocol  - Decadron 6mg for 10 days  - Continue IC, prone positioning, OOB as tolerated. Monitor closely, if deteriorates consult Pulm/ICU  - Monitor fever curve, CBC, CMP    #Lactic acidosis  - Lactic 2.6, follow repeat lactate     #KARYNA, likely due to dehydration  - BUN/Cr 40/1.82, baseline BUN/CR 19/1.01 Oct 2020  - s/p IVF in ED  - Cont gentle IVF NS @50cc/hr x 4 hrs    #Hyponatremia Na 133   - s/p IVF in ED, monitor BMP     #Hypokalemia   - K 3.2, replete and monitor    #HTN, CAD  -ASA 81mg  -Norvasc 5mg qd  -Metoprolol 25mg bid  -Valsartan 160mg qd  -Hctz 25mg qd    #Colon adenocarcinoma with mets to liver and lung  -Per chart review, in remission with no evidence of recurrent disease  -Follows with heme/onc, last visit 1/5/21     Vitals q8h  Diet: DASH  Activity: Bedrest   PT/OT as tolerated  DVT ppx: Lovenox  GI ppx: PPI   Standard precautions: Aspiration, fall, safety, seizure, skin  Code Status - Full Code  HCP - Wife, son Miguel Cell  updated     IMPROVE VTE Individual Risk Assessment          RISK                                                          Points  [  ] Previous VTE                                                3  [  ] Thrombophilia                                             2  [  ] Lower limb paralysis                                   2        (unable to hold up >15 seconds)    [ x ] Current Cancer                                             2         (within 6 months)  [  ] Immobilization > 24 hrs                              1  [  ] ICU/CCU stay > 24 hours                             1  [ x ] Age > 60                                                         1    IMPROVE VTE Score:         [   2      ]   71 y/o M PMH colon adenocarcinoma, oligo-metastatic to liver (3/2014), and metastatic to lung (2016), s/p lung metastaectomy (2/2017), last chemo with Capecitabine (8/2015), HTN, HLD,  c/o SOB, progressive BRYAN, fatigue, malaise, poor PO x 2-3 days admitted for acute hypoxic respiratory failure due to COVID-19 and acute kidney injury and electrolyte abnormalities.    # Acute Hypoxic Respiratory Failure due to COVID-19 infection  - COVID-19 PCR positive 1/7/21. CXR with mid-lower lung infiltrates c/w COVID infection  - Continue isolation precautions  - Monitor respiratory status closely. Oxygen supplementation as needed to maintain saturation >90%  - Remdesivir 5 day course per protocol  - Decadron 6mg for 10 days  - Continue IC, prone positioning, OOB as tolerated. Monitor closely, if deteriorates consult Pulm/ICU  - Monitor fever curve, CBC, CMP    #Lactic acidosis  - Lactic 2.6, follow repeat lactate     #KARYNA, likely due to dehydration  - BUN/Cr 40/1.82, baseline BUN/CR 19/1.01 Oct 2020  - s/p IVF in ED  - Cont gentle IVF NS @50cc/hr x 4 hrs    #Hyponatremia Na 133   - s/p IVF in ED, monitor BMP     #Hypokalemia   - K 3.2, replete and monitor    #HTN, CAD  -ASA 81mg  -Norvasc 5mg qd  -Metoprolol 25mg bid  -Valsartan 160mg qd - hold for KARYNA   -Hctz 25mg qd    #Colon adenocarcinoma with mets to liver and lung  -Per chart review, in remission with no evidence of recurrent disease  -Follows with heme/onc, last visit 1/5/21     Vitals q8h  Diet: DASH  Activity: Bedrest   PT/OT as tolerated  DVT ppx: Lovenox  GI ppx: PPI   Standard precautions: Aspiration, fall, safety, seizure, skin  Code Status - Full Code  HCP - Wife, son Miguel Cell  updated     IMPROVE VTE Individual Risk Assessment          RISK                                                          Points  [  ] Previous VTE                                                3  [  ] Thrombophilia                                             2  [  ] Lower limb paralysis                                   2        (unable to hold up >15 seconds)    [ x ] Current Cancer                                             2         (within 6 months)  [  ] Immobilization > 24 hrs                              1  [  ] ICU/CCU stay > 24 hours                             1  [ x ] Age > 60                                                         1    IMPROVE VTE Score:         [   2      ]

## 2021-01-08 LAB
ALBUMIN SERPL ELPH-MCNC: 2.8 G/DL — LOW (ref 3.3–5)
ALP SERPL-CCNC: 54 U/L — SIGNIFICANT CHANGE UP (ref 40–120)
ALT FLD-CCNC: 26 U/L — SIGNIFICANT CHANGE UP (ref 10–45)
ANION GAP SERPL CALC-SCNC: 13 MMOL/L — SIGNIFICANT CHANGE UP (ref 5–17)
APPEARANCE UR: CLEAR — SIGNIFICANT CHANGE UP
AST SERPL-CCNC: 36 U/L — SIGNIFICANT CHANGE UP (ref 10–40)
BACTERIA # UR AUTO: NEGATIVE /HPF — SIGNIFICANT CHANGE UP
BILIRUB DIRECT SERPL-MCNC: 0.2 MG/DL — SIGNIFICANT CHANGE UP (ref 0–0.2)
BILIRUB INDIRECT FLD-MCNC: 0.2 MG/DL — SIGNIFICANT CHANGE UP (ref 0.2–1)
BILIRUB SERPL-MCNC: 0.4 MG/DL — SIGNIFICANT CHANGE UP (ref 0.2–1.2)
BILIRUB UR-MCNC: NEGATIVE — SIGNIFICANT CHANGE UP
BUN SERPL-MCNC: 35 MG/DL — HIGH (ref 7–23)
CALCIUM SERPL-MCNC: 8.9 MG/DL — SIGNIFICANT CHANGE UP (ref 8.4–10.5)
CHLORIDE SERPL-SCNC: 103 MMOL/L — SIGNIFICANT CHANGE UP (ref 96–108)
CO2 SERPL-SCNC: 23 MMOL/L — SIGNIFICANT CHANGE UP (ref 22–31)
COLOR SPEC: YELLOW — SIGNIFICANT CHANGE UP
CREAT SERPL-MCNC: 1.06 MG/DL — SIGNIFICANT CHANGE UP (ref 0.5–1.3)
CREAT SERPL-MCNC: 1.13 MG/DL — SIGNIFICANT CHANGE UP (ref 0.5–1.3)
DIFF PNL FLD: NEGATIVE — SIGNIFICANT CHANGE UP
EPI CELLS # UR: SIGNIFICANT CHANGE UP
GLUCOSE SERPL-MCNC: 158 MG/DL — HIGH (ref 70–99)
GLUCOSE UR QL: 50 MG/DL
HCT VFR BLD CALC: 39.2 % — SIGNIFICANT CHANGE UP (ref 39–50)
HCV AB S/CO SERPL IA: 0.09 S/CO — SIGNIFICANT CHANGE UP (ref 0–0.99)
HCV AB SERPL-IMP: SIGNIFICANT CHANGE UP
HGB BLD-MCNC: 13.2 G/DL — SIGNIFICANT CHANGE UP (ref 13–17)
HYALINE CASTS # UR AUTO: ABNORMAL (ref 0–7)
KETONES UR-MCNC: NEGATIVE — SIGNIFICANT CHANGE UP
LACTATE SERPL-SCNC: 1.1 MMOL/L — SIGNIFICANT CHANGE UP (ref 0.7–2)
LEUKOCYTE ESTERASE UR-ACNC: NEGATIVE — SIGNIFICANT CHANGE UP
MCHC RBC-ENTMCNC: 31.6 PG — SIGNIFICANT CHANGE UP (ref 27–34)
MCHC RBC-ENTMCNC: 33.7 GM/DL — SIGNIFICANT CHANGE UP (ref 32–36)
MCV RBC AUTO: 93.8 FL — SIGNIFICANT CHANGE UP (ref 80–100)
NITRITE UR-MCNC: NEGATIVE — SIGNIFICANT CHANGE UP
NRBC # BLD: 0 /100 WBCS — SIGNIFICANT CHANGE UP (ref 0–0)
PH UR: 5 — SIGNIFICANT CHANGE UP (ref 5–8)
PLATELET # BLD AUTO: 182 K/UL — SIGNIFICANT CHANGE UP (ref 150–400)
POTASSIUM SERPL-MCNC: 3.5 MMOL/L — SIGNIFICANT CHANGE UP (ref 3.5–5.3)
POTASSIUM SERPL-SCNC: 3.5 MMOL/L — SIGNIFICANT CHANGE UP (ref 3.5–5.3)
PROT SERPL-MCNC: 7.3 G/DL — SIGNIFICANT CHANGE UP (ref 6–8.3)
PROT UR-MCNC: 15
RBC # BLD: 4.18 M/UL — LOW (ref 4.2–5.8)
RBC # FLD: 12.4 % — SIGNIFICANT CHANGE UP (ref 10.3–14.5)
RBC CASTS # UR COMP ASSIST: ABNORMAL /HPF (ref 0–4)
SODIUM SERPL-SCNC: 139 MMOL/L — SIGNIFICANT CHANGE UP (ref 135–145)
SP GR SPEC: 1.01 — SIGNIFICANT CHANGE UP (ref 1.01–1.02)
TROPONIN I SERPL-MCNC: <.017 NG/ML — LOW (ref 0.02–0.06)
UROBILINOGEN FLD QL: NEGATIVE — SIGNIFICANT CHANGE UP
WBC # BLD: 3.41 K/UL — LOW (ref 3.8–10.5)
WBC # FLD AUTO: 3.41 K/UL — LOW (ref 3.8–10.5)
WBC UR QL: SIGNIFICANT CHANGE UP /HPF (ref 0–5)

## 2021-01-08 PROCEDURE — 99233 SBSQ HOSP IP/OBS HIGH 50: CPT | Mod: CS

## 2021-01-08 RX ORDER — ASCORBIC ACID 60 MG
500 TABLET,CHEWABLE ORAL DAILY
Refills: 0 | Status: DISCONTINUED | OUTPATIENT
Start: 2021-01-08 | End: 2021-01-19

## 2021-01-08 RX ORDER — ZINC SULFATE TAB 220 MG (50 MG ZINC EQUIVALENT) 220 (50 ZN) MG
220 TAB ORAL DAILY
Refills: 0 | Status: DISCONTINUED | OUTPATIENT
Start: 2021-01-08 | End: 2021-01-19

## 2021-01-08 RX ORDER — CHOLECALCIFEROL (VITAMIN D3) 125 MCG
1000 CAPSULE ORAL DAILY
Refills: 0 | Status: DISCONTINUED | OUTPATIENT
Start: 2021-01-08 | End: 2021-01-19

## 2021-01-08 RX ADMIN — ZINC SULFATE TAB 220 MG (50 MG ZINC EQUIVALENT) 220 MILLIGRAM(S): 220 (50 ZN) TAB at 14:36

## 2021-01-08 RX ADMIN — Medication 500 MILLIGRAM(S): at 14:36

## 2021-01-08 RX ADMIN — REMDESIVIR 500 MILLIGRAM(S): 5 INJECTION INTRAVENOUS at 18:08

## 2021-01-08 RX ADMIN — Medication 6 MILLIGRAM(S): at 05:23

## 2021-01-08 RX ADMIN — Medication 81 MILLIGRAM(S): at 14:36

## 2021-01-08 RX ADMIN — Medication 25 MILLIGRAM(S): at 18:08

## 2021-01-08 RX ADMIN — Medication 25 MILLIGRAM(S): at 05:23

## 2021-01-08 RX ADMIN — AMLODIPINE BESYLATE 5 MILLIGRAM(S): 2.5 TABLET ORAL at 05:23

## 2021-01-08 RX ADMIN — Medication 1000 UNIT(S): at 14:36

## 2021-01-08 RX ADMIN — ENOXAPARIN SODIUM 40 MILLIGRAM(S): 100 INJECTION SUBCUTANEOUS at 14:36

## 2021-01-08 NOTE — PROGRESS NOTE ADULT - SUBJECTIVE AND OBJECTIVE BOX
Patient is a 70y old  Male who presents with a chief complaint of Acute respiratory failure due to COVID (07 Jan 2021 17:31)    Patient seen and examined at bedside.  no acute events overnight    ALLERGIES:  No Known Allergies        Vital Signs Last 24 Hrs  T(F): 98 (08 Jan 2021 11:15), Max: 102 (07 Jan 2021 14:15)  HR: 80 (08 Jan 2021 11:15) (66 - 101)  BP: 120/60 (08 Jan 2021 11:15) (97/53 - 132/61)  RR: 18 (08 Jan 2021 11:30) (16 - 26)  SpO2: 95% (08 Jan 2021 11:30) (82% - 98%)  I&O's Summary    08 Jan 2021 07:01  -  08 Jan 2021 12:35  --------------------------------------------------------  IN: 620 mL / OUT: 0 mL / NET: 620 mL      MEDICATIONS:  acetaminophen   Tablet .. 650 milliGRAM(s) Oral every 6 hours PRN  amLODIPine   Tablet 5 milliGRAM(s) Oral daily  ascorbic acid 500 milliGRAM(s) Oral daily  aspirin enteric coated 81 milliGRAM(s) Oral daily  cholecalciferol 1000 Unit(s) Oral daily  dexAMETHasone     Tablet 6 milliGRAM(s) Oral daily  enoxaparin Injectable 40 milliGRAM(s) SubCutaneous daily  hydrochlorothiazide 25 milliGRAM(s) Oral daily  influenza   Vaccine 0.5 milliLiter(s) IntraMuscular once  metoprolol tartrate 25 milliGRAM(s) Oral two times a day  remdesivir  IVPB   IV Intermittent   remdesivir  IVPB 100 milliGRAM(s) IV Intermittent every 24 hours  sodium chloride 0.9%. 1000 milliLiter(s) IV Continuous <Continuous>  zinc sulfate 220 milliGRAM(s) Oral daily      PHYSICAL EXAM:  General: NAD, elderly  ENT: MMM, no oral thrush  Neck: Supple, No JVD  Lungs: Diminished at bases, bilateral air entry  Cardio: S1S2 regular  Abdomen: Soft, Nontender  Extremities: No cyanosis, No edema    LABS:                        13.2   3.41  )-----------( 182      ( 08 Jan 2021 07:15 )             39.2     01-08    139  |  103  |  35  ----------------------------<  158  3.5   |  23  |  1.13    Ca    8.9      08 Jan 2021 07:15    TPro  7.3  /  Alb  2.8  /  TBili  0.4  /  DBili  0.2  /  AST  36  /  ALT  26  /  AlkPhos  54  01-08    eGFR if Non African American: 65 mL/min/1.73M2 (01-08-21 @ 07:15)  eGFR if African American: 75 mL/min/1.73M2 (01-08-21 @ 07:15)    PT/INR - ( 07 Jan 2021 14:30 )   PT: 15.4 sec;   INR: 1.29 ratio         PTT - ( 07 Jan 2021 14:30 )  PTT:29.5 sec  Lactate, Blood: 1.1 mmol/L (01-07 @ 22:45)  Lactate, Blood: 2.6 mmol/L (01-07 @ 14:30)    CARDIAC MARKERS ( 08 Jan 2021 07:15 )  <.017 ng/mL / x     / x     / x     / x      CARDIAC MARKERS ( 07 Jan 2021 22:45 )  <.017 ng/mL / x     / x     / x     / x      CARDIAC MARKERS ( 07 Jan 2021 14:30 )  .017 ng/mL / x     / 123 U/L / x     / x                  RADIOLOGY & ADDITIONAL TESTS:    < from: Xray Chest 1 View AP/PA (01.07.21 @ 14:29) >  IMPRESSION: Bilateral lung infiltrates suspect for Covid virus.              DOLORES BERNARD M.D., ATTENDING RADIOLOGIST  This document has been electronically signed. Jan 7 2021  2:38PM    < end of copied text >      Care Discussed with Consultants/Other Providers:    Patient is a 70y old  Male who presents with a chief complaint of Acute respiratory failure due to COVID (07 Jan 2021 17:31)    Patient seen and examined at bedside.  no acute events overnight    ALLERGIES:  No Known Allergies        Vital Signs Last 24 Hrs  T(F): 98 (08 Jan 2021 11:15), Max: 102 (07 Jan 2021 14:15)  HR: 80 (08 Jan 2021 11:15) (66 - 101)  BP: 120/60 (08 Jan 2021 11:15) (97/53 - 132/61)  RR: 18 (08 Jan 2021 11:30) (16 - 26)  SpO2: 95% (08 Jan 2021 11:30) (82% - 98%)  I&O's Summary    08 Jan 2021 07:01  -  08 Jan 2021 12:35  --------------------------------------------------------  IN: 620 mL / OUT: 0 mL / NET: 620 mL      MEDICATIONS:  acetaminophen   Tablet .. 650 milliGRAM(s) Oral every 6 hours PRN  amLODIPine   Tablet 5 milliGRAM(s) Oral daily  ascorbic acid 500 milliGRAM(s) Oral daily  aspirin enteric coated 81 milliGRAM(s) Oral daily  cholecalciferol 1000 Unit(s) Oral daily  dexAMETHasone     Tablet 6 milliGRAM(s) Oral daily  enoxaparin Injectable 40 milliGRAM(s) SubCutaneous daily  hydrochlorothiazide 25 milliGRAM(s) Oral daily  influenza   Vaccine 0.5 milliLiter(s) IntraMuscular once  metoprolol tartrate 25 milliGRAM(s) Oral two times a day  remdesivir  IVPB   IV Intermittent   remdesivir  IVPB 100 milliGRAM(s) IV Intermittent every 24 hours  sodium chloride 0.9%. 1000 milliLiter(s) IV Continuous <Continuous>  zinc sulfate 220 milliGRAM(s) Oral daily      PHYSICAL EXAM:  General: NAD, elderly  ENT: MMM, no oral thrush  Neck: Supple, No JVD  Lungs: Diminished at bases, bilateral air entry  Cardio: S1S2 regular  Abdomen: Soft, Nontender  Extremities: No cyanosis, No edema    LABS:                        13.2   3.41  )-----------( 182      ( 08 Jan 2021 07:15 )             39.2     01-08    139  |  103  |  35  ----------------------------<  158  3.5   |  23  |  1.13    Ca    8.9      08 Jan 2021 07:15    TPro  7.3  /  Alb  2.8  /  TBili  0.4  /  DBili  0.2  /  AST  36  /  ALT  26  /  AlkPhos  54  01-08    eGFR if Non African American: 65 mL/min/1.73M2 (01-08-21 @ 07:15)  eGFR if African American: 75 mL/min/1.73M2 (01-08-21 @ 07:15)    PT/INR - ( 07 Jan 2021 14:30 )   PT: 15.4 sec;   INR: 1.29 ratio         PTT - ( 07 Jan 2021 14:30 )  PTT:29.5 sec  Lactate, Blood: 1.1 mmol/L (01-07 @ 22:45)  Lactate, Blood: 2.6 mmol/L (01-07 @ 14:30)    CARDIAC MARKERS ( 08 Jan 2021 07:15 )  <.017 ng/mL / x     / x     / x     / x      CARDIAC MARKERS ( 07 Jan 2021 22:45 )  <.017 ng/mL / x     / x     / x     / x      CARDIAC MARKERS ( 07 Jan 2021 14:30 )  .017 ng/mL / x     / 123 U/L / x     / x        RADIOLOGY & ADDITIONAL TESTS:    < from: Xray Chest 1 View AP/PA (01.07.21 @ 14:29) >  IMPRESSION: Bilateral lung infiltrates suspect for Covid virus.              DOLORES BERNARD M.D., ATTENDING RADIOLOGIST  This document has been electronically signed. Jan 7 2021  2:38PM    < end of copied text >  r   No

## 2021-01-08 NOTE — PROGRESS NOTE ADULT - ATTENDING COMMENTS
70 male w/ significant metastatic cancer history, including w/ h/o lung metastaectomy (2/2017), admitted with Severe sepsis and AHRF secondary to Covid pneumonia, KARYNA, and dehydration. He is on treatment with  remdesivir and decadron and overall feels improved from admission w/o shortness of breath or fever.   He is not short of breath at the moment and not wheezy - if develops either sx, will start albuterol inhaler.    I have personally seen and examined patient on the above date.  I discussed the case with DIANE Sanchez and I agree with findings and plan as detailed per note above, which I have amended where appropriate. 70 male w/ significant metastatic cancer history, including w/ h/o lung metastaectomy (2/2017), admitted with Severe sepsis and AHRF secondary to Covid pneumonia, KARYNA, and dehydration. He is on treatment with  remdesivir and decadron and overall feels improved from admission w/o shortness of breath or fever.   He is not short of breath at the moment and not wheezy - if develops either sx, will start albuterol inhaler. CXR as read by me with  b/l infiltrates.     I have personally seen and examined patient on the above date.  I discussed the case with DIANE Sanchez and I agree with findings and plan as detailed per note above, which I have amended where appropriate.

## 2021-01-08 NOTE — DIETITIAN INITIAL EVALUATION ADULT. - ADD RECOMMEND
Obtain and honor food preferences as able. Encourage PO intake at meal times. Monitor acceptance of oral nutrition supplements.

## 2021-01-08 NOTE — DIETITIAN INITIAL EVALUATION ADULT. - ORAL INTAKE PTA/DIET HISTORY
Diet history obtained from pt's son. He reports that pt typically has a good appetite; consumes 3 meals/day prepared by pt's wife. Did not use nutritional supplements PTA. Denies food allergies/intolerances, difficulty chewing/swallowing.

## 2021-01-08 NOTE — PROGRESS NOTE ADULT - ASSESSMENT
70 male with pmh colon adenocarcinoma, oligo-metastatic to liver (3/2014), and metastatic to lung (2016), s/p lung metastaectomy (2/2017), last chemo with capecitabine (8/2015), htn, hld, presents with sob, progressive BRYAN, fatigue, malaise, poor po intake admitted with Covid pneumonia with hypoxia, KARYNA and electrolyte abnormalities.    # Covid pneumonia with hypoxia  Covid pcr positive 1/7/21  CXR done on 1/7/21 shows bilateral infiltrates  plan to continue remdesivir day 2/5, decadron 2/10  currently on 5 liters NC 96%  monitor respiratory status  D-dimer 172 on 1/7  monitor fever curve    # Lactic Acidosis  resolved  initial lactate 2.6, repeat 1.1  continue ns @ 50 for now    # KARYNA  likely due to dehydration  baseline bun/Cr 19/1.01 10/20  Cr today 1.13  continue normal saline  monitor renal indices    # Hyponatremia  Na today 139 - resolved  s/p IVF in ED  monitor BMP    # Hypokalemia  K 3.5 today  replete prn  monitor    # CAD  # HTN  chronic conditions  continue asa 81 daily  norvasc 5 daily, metoprolol 25 BID, HCTZ 25 daily  holding valsartan 2/2 KARYNA    # H/O Colon Adenocarcinoma with mets  in remission with no evidence of recurrent dz  follows heme/onc as outpatient, last visit 1/5/21    # [facility]  full code  spoke with son Miguel [phone] who was updated           70 male with pmh colon adenocarcinoma, oligo-metastatic to liver (3/2014), and metastatic to lung (2016), s/p lung metastaectomy (2/2017), last chemo with capecitabine (8/2015), htn, hld, presents with sob, progressive BRYAN, fatigue, malaise, poor po intake admitted with Covid pneumonia with hypoxia, KARYNA and electrolyte abnormalities.    # Severe sepsis and acute hypoxic respiratory failure 2/2 Covid pneumonia   SIRS criteria: fever of 102 on arrival to ER, tachypnea. End organ damage: KARYNA, hypoxia, elevated lactate   Covid pcr positive 1/7/21  CXR done on 1/7/21 shows bilateral infiltrates  plan to continue remdesivir day 2/5, decadron 2/10  currently on 5 liters NC 96%  monitor respiratory status  D-dimer 172 on 1/7  monitor fever curve  encourage IS and prone positioning  vitamin supplementation    # Lactic Acidosis  resolved  initial lactate 2.6, repeat 1.1    # KARYNA  likely due to dehydration, s/p IVF.   baseline bun/Cr 19/1.01 (10/20)  Cr today 1.13  monitor renal indices  hold diuretics and ARB as below for now.    # Hyponatremia  Na today 139 - resolved  s/p IVF in ED  monitor BMP    # Hypokalemia  K 3.5 today  replete prn  monitor    # CAD  # HTN  chronic conditions  continue asa 81 daily  norvasc 5 daily, metoprolol 25 BID  holding valsartan and HCTZ 25 mg qd 2/2 KARYNA.  Likely can Restart tomorrow if renal function permits.     # H/O Colon Adenocarcinoma with mets  in remission with no evidence of recurrent dz  follows heme/onc as outpatient, last visit 1/5/21    # [facility]  full code  spoke with son Miguel [phone] who was updated           70 male with pmh colon adenocarcinoma, oligo-metastatic to liver (3/2014), and metastatic to lung (2016), s/p lung metastaectomy (2/2017), last chemo with capecitabine (8/2015), htn, hld, presents with sob, progressive BRYAN, fatigue, malaise, poor po intake admitted with Covid pneumonia with hypoxia, KARYNA and electrolyte abnormalities.    # Severe sepsis and acute hypoxic respiratory failure 2/2 Covid pneumonia   SIRS criteria: fever of 102 on arrival to ER, tachypnea. End organ damage: KARYNA, hypoxia, elevated lactate   Covid pcr positive 1/7/21  CXR done on 1/7/21 shows bilateral infiltrates  plan to continue remdesivir day 2/5, decadron 2/10  currently on 5 liters NC 96%  monitor respiratory status  D-dimer 172 on 1/7  monitor fever curve  encourage IS and prone positioning  add vitamin supplementation    # Lactic Acidosis  resolved  initial lactate 2.6, repeat 1.1    # KARYNA  likely due to dehydration, s/p IVF.   baseline bun/Cr 19/1.01 (10/20)  Cr today 1.13  monitor renal indices  hold diuretics and ARB as below for now.    # Hyponatremia  Na today 139 - resolved  s/p IVF in ED  monitor BMP    # Hypokalemia  K 3.5 today  replete prn  monitor    # CAD  # HTN  chronic conditions  continue asa 81 daily  norvasc 5 daily, metoprolol 25 BID  holding valsartan and HCTZ 25 mg qd 2/2 KARYNA.  Likely can Restart tomorrow if renal function permits.     # H/O Colon Adenocarcinoma with mets  in remission with no evidence of recurrent dz  follows heme/onc as outpatient, last visit 1/5/21    # [facility]  full code  spoke with son Miguel [phone] who was updated

## 2021-01-08 NOTE — DIETITIAN INITIAL EVALUATION ADULT. - OTHER INFO
70 male with pmh colon adenocarcinoma, oligo-metastatic to liver (3/2014), and metastatic to lung (2016), s/p lung metastasectomy (2/2017) htn, hld, presents with Covid pneumonia with hypoxia, KARYNA and electrolyte abnormalities. Pt noted with poor appetite 2-3 days prior to admission due to symptoms associated with acute illness, COVID 19. Appetite now improving- as per RN, pt has been consuming about 50% of meals. Food preferences obtained from pt's son. Receptive to adding Ensure Enlive QD until pt consuming >75% of most meals. Pt continues to receive vitamin c, vitamin d, zinc sulfate. No GI distress.

## 2021-01-09 LAB
ALBUMIN SERPL ELPH-MCNC: 2.8 G/DL — LOW (ref 3.3–5)
ALP SERPL-CCNC: 63 U/L — SIGNIFICANT CHANGE UP (ref 40–120)
ALT FLD-CCNC: 30 U/L — SIGNIFICANT CHANGE UP (ref 10–45)
ANION GAP SERPL CALC-SCNC: 11 MMOL/L — SIGNIFICANT CHANGE UP (ref 5–17)
AST SERPL-CCNC: 39 U/L — SIGNIFICANT CHANGE UP (ref 10–40)
BILIRUB DIRECT SERPL-MCNC: 0.1 MG/DL — SIGNIFICANT CHANGE UP (ref 0–0.2)
BILIRUB INDIRECT FLD-MCNC: 0.3 MG/DL — SIGNIFICANT CHANGE UP (ref 0.2–1)
BILIRUB SERPL-MCNC: 0.4 MG/DL — SIGNIFICANT CHANGE UP (ref 0.2–1.2)
BUN SERPL-MCNC: 40 MG/DL — HIGH (ref 7–23)
CALCIUM SERPL-MCNC: 9 MG/DL — SIGNIFICANT CHANGE UP (ref 8.4–10.5)
CHLORIDE SERPL-SCNC: 103 MMOL/L — SIGNIFICANT CHANGE UP (ref 96–108)
CO2 SERPL-SCNC: 27 MMOL/L — SIGNIFICANT CHANGE UP (ref 22–31)
CREAT SERPL-MCNC: 1.1 MG/DL — SIGNIFICANT CHANGE UP (ref 0.5–1.3)
CREAT SERPL-MCNC: 1.15 MG/DL — SIGNIFICANT CHANGE UP (ref 0.5–1.3)
CULTURE RESULTS: SIGNIFICANT CHANGE UP
GLUCOSE SERPL-MCNC: 142 MG/DL — HIGH (ref 70–99)
HCT VFR BLD CALC: 40.9 % — SIGNIFICANT CHANGE UP (ref 39–50)
HGB BLD-MCNC: 13.6 G/DL — SIGNIFICANT CHANGE UP (ref 13–17)
MCHC RBC-ENTMCNC: 30.6 PG — SIGNIFICANT CHANGE UP (ref 27–34)
MCHC RBC-ENTMCNC: 33.3 GM/DL — SIGNIFICANT CHANGE UP (ref 32–36)
MCV RBC AUTO: 92.1 FL — SIGNIFICANT CHANGE UP (ref 80–100)
NRBC # BLD: 0 /100 WBCS — SIGNIFICANT CHANGE UP (ref 0–0)
PLATELET # BLD AUTO: 253 K/UL — SIGNIFICANT CHANGE UP (ref 150–400)
POTASSIUM SERPL-MCNC: 3.2 MMOL/L — LOW (ref 3.5–5.3)
POTASSIUM SERPL-SCNC: 3.2 MMOL/L — LOW (ref 3.5–5.3)
PROT SERPL-MCNC: 7.3 G/DL — SIGNIFICANT CHANGE UP (ref 6–8.3)
RBC # BLD: 4.44 M/UL — SIGNIFICANT CHANGE UP (ref 4.2–5.8)
RBC # FLD: 12.1 % — SIGNIFICANT CHANGE UP (ref 10.3–14.5)
SODIUM SERPL-SCNC: 141 MMOL/L — SIGNIFICANT CHANGE UP (ref 135–145)
SPECIMEN SOURCE: SIGNIFICANT CHANGE UP
WBC # BLD: 14.41 K/UL — HIGH (ref 3.8–10.5)
WBC # FLD AUTO: 14.41 K/UL — HIGH (ref 3.8–10.5)

## 2021-01-09 PROCEDURE — 99233 SBSQ HOSP IP/OBS HIGH 50: CPT | Mod: CS

## 2021-01-09 RX ORDER — POTASSIUM CHLORIDE 20 MEQ
40 PACKET (EA) ORAL EVERY 4 HOURS
Refills: 0 | Status: COMPLETED | OUTPATIENT
Start: 2021-01-09 | End: 2021-01-09

## 2021-01-09 RX ADMIN — Medication 500 MILLIGRAM(S): at 13:37

## 2021-01-09 RX ADMIN — Medication 1000 UNIT(S): at 13:37

## 2021-01-09 RX ADMIN — ZINC SULFATE TAB 220 MG (50 MG ZINC EQUIVALENT) 220 MILLIGRAM(S): 220 (50 ZN) TAB at 13:38

## 2021-01-09 RX ADMIN — Medication 81 MILLIGRAM(S): at 13:37

## 2021-01-09 RX ADMIN — Medication 40 MILLIEQUIVALENT(S): at 13:38

## 2021-01-09 RX ADMIN — Medication 25 MILLIGRAM(S): at 18:23

## 2021-01-09 RX ADMIN — Medication 6 MILLIGRAM(S): at 05:28

## 2021-01-09 RX ADMIN — ENOXAPARIN SODIUM 40 MILLIGRAM(S): 100 INJECTION SUBCUTANEOUS at 13:37

## 2021-01-09 RX ADMIN — AMLODIPINE BESYLATE 5 MILLIGRAM(S): 2.5 TABLET ORAL at 05:28

## 2021-01-09 RX ADMIN — Medication 40 MILLIEQUIVALENT(S): at 18:24

## 2021-01-09 RX ADMIN — Medication 25 MILLIGRAM(S): at 05:28

## 2021-01-09 RX ADMIN — REMDESIVIR 500 MILLIGRAM(S): 5 INJECTION INTRAVENOUS at 18:24

## 2021-01-09 NOTE — PROGRESS NOTE ADULT - ATTENDING COMMENTS
Pt seen and examined   Sepsis with ARF w/ Hypoxia due to COVID-19 PNA  93% on 2L NC  Remdesivir Day 3/5  Decadron Day 3/10  KARYNA resolved Pt seen and examined   Sepsis with ARF w/ Hypoxia due to COVID-19 PNA  93% on 5L NC  Remdesivir Day 3/5  Decadron Day 3/10  KARYNA resolved

## 2021-01-09 NOTE — PROGRESS NOTE ADULT - ASSESSMENT
70 male with pmh colon adenocarcinoma, oligo-metastatic to liver (3/2014), and metastatic to lung (2016), s/p lung metastaectomy (2/2017), last chemo with capecitabine (8/2015), htn, hld, presents with sob, progressive BRYAN, fatigue, malaise, poor po intake admitted with Covid pneumonia with hypoxia, KARYNA and electrolyte abnormalities.    # Severe sepsis and acute hypoxic respiratory failure 2/2 Covid pneumonia   SIRS criteria: fever of 102 on arrival to ER, tachypnea. End organ damage: KARYNA, hypoxia, elevated lactate   Covid pcr positive 1/7/21  CXR done on 1/7/21 shows bilateral infiltrates  plan to continue remdesivir day 3/5, decadron 3/10  currently on 5 liters NC 96%, plan to wean down oxygen requirements to maintain sats > 88%  monitor respiratory status  D-dimer 172 on 1/7  monitor fever curve  encourage IS and prone positioning  add vitamin supplementation    # Lactic Acidosis  resolved  initial lactate 2.6, repeat 1.1    # KARYNA  likely due to dehydration, s/p IVF. - resolved  baseline bun/Cr 19/1.01 (10/20)  Cr today 1.15  monitor renal indices  hold diuretics and ARB as below for now.    # Hyponatremia  Na today 141 - resolved  s/p IVF in ED  monitor BMP    # Hypokalemia  K 3.2 today  replete prn, follow up mag  monitor    # Steroid induced leukocytosis  WBC up 14.41 in s/o prednisone for Covid  monitor    # CAD  # HTN  chronic conditions  continue asa 81 daily  norvasc 5 daily, metoprolol 25 BID  holding valsartan and HCTZ 25 mg qd 2/2 KARYNA.  Likely can Restart tomorrow if renal function permits.     # H/O Colon Adenocarcinoma with mets  in remission with no evidence of recurrent dz  follows heme/onc as outpatient, last visit 1/5/21    # [facility]  full code  spoke with son Miguel [phone] who was updated           70 male with pmh colon adenocarcinoma, oligo-metastatic to liver (3/2014), and metastatic to lung (2016), s/p lung metastaectomy (2/2017), last chemo with capecitabine (8/2015), htn, hld, presents with sob, progressive BRYAN, fatigue, malaise, poor po intake admitted with Covid pneumonia with hypoxia, KARYNA and electrolyte abnormalities.    # Severe sepsis and acute hypoxic respiratory failure 2/2 Covid pneumonia   SIRS criteria: fever of 102 on arrival to ER, tachypnea. End organ damage: KARYNA, hypoxia, elevated lactate   Covid pcr positive 1/7/21  CXR done on 1/7/21 shows bilateral infiltrates  plan to continue remdesivir day 3/5, decadron 3/10  currently on 5 liters NC 96%, plan to wean down oxygen requirements to maintain sats > 88%  monitor respiratory status  D-dimer 172 on 1/7  monitor fever curve  encourage IS and prone positioning  add vitamin supplementation    # Lactic Acidosis  resolved  initial lactate 2.6, repeat 1.1    # KARYNA  likely due to dehydration, s/p IVF. - resolved  baseline bun/Cr 19/1.01 (10/20)  Cr today 1.15  monitor renal indices  hold diuretics and ARB as below for now.    # Hyponatremia  Na today 141 - resolved  s/p IVF in ED  monitor BMP    # Hypokalemia  K 3.2 today  replete prn, follow up mag  monitor    # Steroid induced leukocytosis  WBC up 14.41 in s/o prednisone for Covid  monitor    # CAD  # HTN  chronic conditions  continue asa 81 daily  norvasc 5 daily, metoprolol 25 BID  holding valsartan and HCTZ 25 mg qd 2/2 KARNYA (on admission) - BP has been stable without, consider restarting when BP permits.    # H/O Colon Adenocarcinoma with mets  in remission with no evidence of recurrent dz  follows heme/onc as outpatient, last visit 1/5/21    # [facility]  full code  spoke with son Miguel [phone] who was updated

## 2021-01-09 NOTE — PROGRESS NOTE ADULT - SUBJECTIVE AND OBJECTIVE BOX
Patient is a 70y old  Male who presents with a chief complaint of Acute respiratory failure due to COVID (2021 15:46)    Patient seen and examined at bedside.  no acute events overnight    ALLERGIES:  No Known Allergies        Vital Signs Last 24 Hrs  T(F): 97 (2021 06:47), Max: 97.5 (2021 16:52)  HR: 71 (2021 06:47) (62 - 74)  BP: 116/52 (2021 06:47) (116/52 - 121/54)  RR: 19 (2021 06:47) (18 - 19)  SpO2: 93% (2021 06:47) (93% - 93%)  I&O's Summary    2021 07:01  -  2021 07:00  --------------------------------------------------------  IN: 1350 mL / OUT: 800 mL / NET: 550 mL    2021 07:01  -  2021 12:20  --------------------------------------------------------  IN: 860 mL / OUT: 425 mL / NET: 435 mL      MEDICATIONS:  acetaminophen   Tablet .. 650 milliGRAM(s) Oral every 6 hours PRN  amLODIPine   Tablet 5 milliGRAM(s) Oral daily  ascorbic acid 500 milliGRAM(s) Oral daily  aspirin enteric coated 81 milliGRAM(s) Oral daily  cholecalciferol 1000 Unit(s) Oral daily  dexAMETHasone     Tablet 6 milliGRAM(s) Oral daily  enoxaparin Injectable 40 milliGRAM(s) SubCutaneous daily  influenza   Vaccine 0.5 milliLiter(s) IntraMuscular once  metoprolol tartrate 25 milliGRAM(s) Oral two times a day  remdesivir  IVPB   IV Intermittent   remdesivir  IVPB 100 milliGRAM(s) IV Intermittent every 24 hours  sodium chloride 0.9%. 1000 milliLiter(s) IV Continuous <Continuous>  zinc sulfate 220 milliGRAM(s) Oral daily      PHYSICAL EXAM:  General: NAD, elderly  ENT: MMM, no oral thrush  Neck: Supple, No JVD  Lungs: Diminished at bases, bilateral air entry  Cardio: S1S2 regular  Abdomen: Soft, Nontender  Extremities: No cyanosis, No edema    LABS:                        13.6   14.41 )-----------( 253      ( 2021 05:00 )             40.9         141  |  103  |  40  ----------------------------<  142  3.2   |  27  |  1.15    Ca    9.0      2021 05:00    TPro  7.3  /  Alb  2.8  /  TBili  0.4  /  DBili  0.1  /  AST  39  /  ALT  30  /  AlkPhos  63      eGFR if Non African American: 64 mL/min/1.73M2 (21 @ 05:00)  eGFR if : 74 mL/min/1.73M2 (21 @ 05:00)    PT/INR - ( 2021 14:30 )   PT: 15.4 sec;   INR: 1.29 ratio         PTT - ( 2021 14:30 )  PTT:29.5 sec  Lactate, Blood: 1.1 mmol/L ( @ 22:45)  Lactate, Blood: 2.6 mmol/L ( @ 14:30)    CARDIAC MARKERS ( 2021 15:12 )  <.017 ng/mL / x     / x     / x     / x      CARDIAC MARKERS ( 2021 07:15 )  <.017 ng/mL / x     / x     / x     / x      CARDIAC MARKERS ( 2021 22:45 )  <.017 ng/mL / x     / x     / x     / x      CARDIAC MARKERS ( 2021 14:30 )  .017 ng/mL / x     / 123 U/L / x     / x                            Urinalysis Basic - ( 2021 16:25 )    Color: Yellow / Appearance: Clear / S.015 / pH: x  Gluc: x / Ketone: Negative  / Bili: Negative / Urobili: Negative   Blood: x / Protein: 15 / Nitrite: Negative   Leuk Esterase: Negative / RBC: 5-10 /HPF / WBC 0-2 /HPF   Sq Epi: x / Non Sq Epi: Neg.-Few / Bacteria: Negative /HPF        Culture - Blood (collected 2021 14:35)  Source: .Blood Blood-Peripheral  Preliminary Report (2021 19:02):    No growth to date.    Culture - Blood (collected 2021 14:30)  Source: .Blood Blood-Peripheral  Preliminary Report (2021 19:02):    No growth to date.        RADIOLOGY & ADDITIONAL TESTS:    Care Discussed with Consultants/Other Providers:

## 2021-01-10 LAB
ALBUMIN SERPL ELPH-MCNC: 2.6 G/DL — LOW (ref 3.3–5)
ALBUMIN SERPL ELPH-MCNC: 2.6 G/DL — LOW (ref 3.3–5)
ALP SERPL-CCNC: 61 U/L — SIGNIFICANT CHANGE UP (ref 40–120)
ALP SERPL-CCNC: 61 U/L — SIGNIFICANT CHANGE UP (ref 40–120)
ALT FLD-CCNC: 36 U/L — SIGNIFICANT CHANGE UP (ref 10–45)
ALT FLD-CCNC: 36 U/L — SIGNIFICANT CHANGE UP (ref 10–45)
ANION GAP SERPL CALC-SCNC: 10 MMOL/L — SIGNIFICANT CHANGE UP (ref 5–17)
AST SERPL-CCNC: 40 U/L — SIGNIFICANT CHANGE UP (ref 10–40)
AST SERPL-CCNC: 40 U/L — SIGNIFICANT CHANGE UP (ref 10–40)
BASOPHILS # BLD AUTO: 0.01 K/UL — SIGNIFICANT CHANGE UP (ref 0–0.2)
BASOPHILS NFR BLD AUTO: 0.1 % — SIGNIFICANT CHANGE UP (ref 0–2)
BILIRUB DIRECT SERPL-MCNC: 0.2 MG/DL — SIGNIFICANT CHANGE UP (ref 0–0.2)
BILIRUB INDIRECT FLD-MCNC: 0.2 MG/DL — SIGNIFICANT CHANGE UP (ref 0.2–1)
BILIRUB SERPL-MCNC: 0.4 MG/DL — SIGNIFICANT CHANGE UP (ref 0.2–1.2)
BILIRUB SERPL-MCNC: 0.4 MG/DL — SIGNIFICANT CHANGE UP (ref 0.2–1.2)
BUN SERPL-MCNC: 37 MG/DL — HIGH (ref 7–23)
CALCIUM SERPL-MCNC: 8.7 MG/DL — SIGNIFICANT CHANGE UP (ref 8.4–10.5)
CHLORIDE SERPL-SCNC: 107 MMOL/L — SIGNIFICANT CHANGE UP (ref 96–108)
CO2 SERPL-SCNC: 24 MMOL/L — SIGNIFICANT CHANGE UP (ref 22–31)
CREAT SERPL-MCNC: 1.05 MG/DL — SIGNIFICANT CHANGE UP (ref 0.5–1.3)
CREAT SERPL-MCNC: 1.05 MG/DL — SIGNIFICANT CHANGE UP (ref 0.5–1.3)
EOSINOPHIL # BLD AUTO: 0 K/UL — SIGNIFICANT CHANGE UP (ref 0–0.5)
EOSINOPHIL NFR BLD AUTO: 0 % — SIGNIFICANT CHANGE UP (ref 0–6)
GLUCOSE SERPL-MCNC: 147 MG/DL — HIGH (ref 70–99)
HCT VFR BLD CALC: 39.2 % — SIGNIFICANT CHANGE UP (ref 39–50)
HGB BLD-MCNC: 13.6 G/DL — SIGNIFICANT CHANGE UP (ref 13–17)
IMM GRANULOCYTES NFR BLD AUTO: 0.8 % — SIGNIFICANT CHANGE UP (ref 0–1.5)
LYMPHOCYTES # BLD AUTO: 0.37 K/UL — LOW (ref 1–3.3)
LYMPHOCYTES # BLD AUTO: 3.5 % — LOW (ref 13–44)
MAGNESIUM SERPL-MCNC: 2.3 MG/DL — SIGNIFICANT CHANGE UP (ref 1.6–2.6)
MCHC RBC-ENTMCNC: 32 PG — SIGNIFICANT CHANGE UP (ref 27–34)
MCHC RBC-ENTMCNC: 34.7 GM/DL — SIGNIFICANT CHANGE UP (ref 32–36)
MCV RBC AUTO: 92.2 FL — SIGNIFICANT CHANGE UP (ref 80–100)
MONOCYTES # BLD AUTO: 0.76 K/UL — SIGNIFICANT CHANGE UP (ref 0–0.9)
MONOCYTES NFR BLD AUTO: 7.3 % — SIGNIFICANT CHANGE UP (ref 2–14)
NEUTROPHILS # BLD AUTO: 9.25 K/UL — HIGH (ref 1.8–7.4)
NEUTROPHILS NFR BLD AUTO: 88.3 % — HIGH (ref 43–77)
NRBC # BLD: 0 /100 WBCS — SIGNIFICANT CHANGE UP (ref 0–0)
PLATELET # BLD AUTO: 245 K/UL — SIGNIFICANT CHANGE UP (ref 150–400)
POTASSIUM SERPL-MCNC: 4.1 MMOL/L — SIGNIFICANT CHANGE UP (ref 3.5–5.3)
POTASSIUM SERPL-SCNC: 4.1 MMOL/L — SIGNIFICANT CHANGE UP (ref 3.5–5.3)
PROT SERPL-MCNC: 6.7 G/DL — SIGNIFICANT CHANGE UP (ref 6–8.3)
PROT SERPL-MCNC: 6.7 G/DL — SIGNIFICANT CHANGE UP (ref 6–8.3)
RBC # BLD: 4.25 M/UL — SIGNIFICANT CHANGE UP (ref 4.2–5.8)
RBC # FLD: 12.1 % — SIGNIFICANT CHANGE UP (ref 10.3–14.5)
SODIUM SERPL-SCNC: 141 MMOL/L — SIGNIFICANT CHANGE UP (ref 135–145)
WBC # BLD: 10.47 K/UL — SIGNIFICANT CHANGE UP (ref 3.8–10.5)
WBC # FLD AUTO: 10.47 K/UL — SIGNIFICANT CHANGE UP (ref 3.8–10.5)

## 2021-01-10 PROCEDURE — 99232 SBSQ HOSP IP/OBS MODERATE 35: CPT | Mod: CS

## 2021-01-10 RX ADMIN — Medication 6 MILLIGRAM(S): at 05:29

## 2021-01-10 RX ADMIN — Medication 500 MILLIGRAM(S): at 11:33

## 2021-01-10 RX ADMIN — Medication 81 MILLIGRAM(S): at 11:32

## 2021-01-10 RX ADMIN — AMLODIPINE BESYLATE 5 MILLIGRAM(S): 2.5 TABLET ORAL at 11:32

## 2021-01-10 RX ADMIN — ZINC SULFATE TAB 220 MG (50 MG ZINC EQUIVALENT) 220 MILLIGRAM(S): 220 (50 ZN) TAB at 11:32

## 2021-01-10 RX ADMIN — REMDESIVIR 500 MILLIGRAM(S): 5 INJECTION INTRAVENOUS at 17:32

## 2021-01-10 RX ADMIN — ENOXAPARIN SODIUM 40 MILLIGRAM(S): 100 INJECTION SUBCUTANEOUS at 11:32

## 2021-01-10 RX ADMIN — Medication 25 MILLIGRAM(S): at 20:28

## 2021-01-10 RX ADMIN — Medication 1000 UNIT(S): at 12:08

## 2021-01-10 RX ADMIN — Medication 25 MILLIGRAM(S): at 11:32

## 2021-01-10 NOTE — PROVIDER CONTACT NOTE (OTHER) - ASSESSMENT
Vital signs: Heart rate 69, Blood pressure 114/59. Patient has 6am metoprolol 25mg and amlodipine 5mg ordered.

## 2021-01-10 NOTE — PROGRESS NOTE ADULT - ASSESSMENT
70 male with pmh colon adenocarcinoma, oligo-metastatic to liver (3/2014), and metastatic to lung (2016), s/p lung metastaectomy (2/2017), last chemo with capecitabine (8/2015), htn, hld, presents with sob, progressive BRYAN, fatigue, malaise, poor po intake admitted with Covid pneumonia with hypoxia, KARYNA and electrolyte abnormalities.    # Severe sepsis and acute hypoxic respiratory failure 2/2 Covid pneumonia   # Lactic acidosis; resolved  -Remdesivir day 4/5, Decadron 4/10  -Still requires supplemental O2 at 5L NC. Wean as tolerated  -Home O2 evaluation today  -PT consult    # KARYNA  # Hyponatremia  -Resolved  -Monitor    # Hypokalemia  replete prn  monitor    # Steroid induced leukocytosis  -Resolved  -Monitor    # CAD  # HTN  chronic conditions  continue asa 81 daily  norvasc 5 daily, metoprolol 25 BID  holding valsartan and HCTZ 25 mg qd 2/2 KARYNA (on admission) - BP has been stable without, consider restarting when BP permits.    # H/O Colon Adenocarcinoma with mets  in remission with no evidence of recurrent dz  follows heme/onc as outpatient, last visit 1/5/21    # [facility]  full code  Called son Miguel [phone] multiple time

## 2021-01-10 NOTE — PROGRESS NOTE ADULT - SUBJECTIVE AND OBJECTIVE BOX
Patient is a 70y old  Male who presents with a chief complaint of Acute respiratory failure due to COVID (2021 12:20)      SUBJECTIVE / OVERNIGHT EVENTS:  Pt seen and examined at bedside. No acute events overnight.  Pt denies cp, palpitations, sob, abd pain, N/V, fever, chills.    ROS:  All other review of systems negative    Allergies    No Known Allergies    Intolerances        MEDICATIONS  (STANDING):  amLODIPine   Tablet 5 milliGRAM(s) Oral daily  ascorbic acid 500 milliGRAM(s) Oral daily  aspirin enteric coated 81 milliGRAM(s) Oral daily  cholecalciferol 1000 Unit(s) Oral daily  dexAMETHasone     Tablet 6 milliGRAM(s) Oral daily  enoxaparin Injectable 40 milliGRAM(s) SubCutaneous daily  influenza   Vaccine 0.5 milliLiter(s) IntraMuscular once  metoprolol tartrate 25 milliGRAM(s) Oral two times a day  remdesivir  IVPB 100 milliGRAM(s) IV Intermittent every 24 hours  remdesivir  IVPB   IV Intermittent   sodium chloride 0.9%. 1000 milliLiter(s) (50 mL/Hr) IV Continuous <Continuous>  zinc sulfate 220 milliGRAM(s) Oral daily    MEDICATIONS  (PRN):  acetaminophen   Tablet .. 650 milliGRAM(s) Oral every 6 hours PRN Temp greater or equal to 38C (100.4F), Moderate Pain (4 - 6)      Vital Signs Last 24 Hrs  T(C): 36.2 (10 Ovi 2021 04:59), Max: 36.7 (2021 17:40)  T(F): 97.2 (10 Ovi 2021 04:59), Max: 98 (2021 17:40)  HR: 69 (10 Ovi 2021 04:59) (63 - 81)  BP: 114/59 (10 Ovi 2021 04:59) (114/59 - 127/60)  BP(mean): --  RR: 15 (10 Ovi 2021 04:59) (15 - 19)  SpO2: 95% (10 Ovi 2021 04:59) (91% - 97%)  CAPILLARY BLOOD GLUCOSE        I&O's Summary    2021 07:01  -  10 Ovi 2021 07:00  --------------------------------------------------------  IN: 1970 mL / OUT: 850 mL / NET: 1120 mL        PHYSICAL EXAM:  GENERAL: NAD, well-developed  CHEST/LUNG: Decreased breath sounds bilaterally; No wheeze  HEART: Regular rate and rhythm; No murmurs, rubs, or gallops  ABDOMEN: Soft, Nontender, Nondistended; Bowel sounds present  EXTREMITIES:  2+ Peripheral Pulses, No clubbing, cyanosis, or edema  NEUROLOGY: AAOx3, non-focal  PSYCH: calm    LABS:                        13.6   10.47 )-----------( 245      ( 10 Ovi 2021 07:00 )             39.2     01-10    141  |  107  |  37<H>  ----------------------------<  147<H>  4.1   |  24  |  1.05    Ca    8.7      10 Ovi 2021 07:00  Mg     2.3     01-10    TPro  6.7  /  Alb  2.6<L>  /  TBili  0.4  /  DBili  0.2  /  AST  40  /  ALT  36  /  AlkPhos  61  01-10      CARDIAC MARKERS ( 2021 15:12 )  <.017 ng/mL / x     / x     / x     / x          Urinalysis Basic - ( 2021 16:25 )    Color: Yellow / Appearance: Clear / S.015 / pH: x  Gluc: x / Ketone: Negative  / Bili: Negative / Urobili: Negative   Blood: x / Protein: 15 / Nitrite: Negative   Leuk Esterase: Negative / RBC: 5-10 /HPF / WBC 0-2 /HPF   Sq Epi: x / Non Sq Epi: Neg.-Few / Bacteria: Negative /HPF        RADIOLOGY & ADDITIONAL TESTS:  Results Reviewed:   Imaging Personally Reviewed:  Electrocardiogram Personally Reviewed:    COORDINATION OF CARE:  Care Discussed with Consultants/Other Providers [Y/N]:  Prior or Outpatient Records Reviewed [Y/N]:

## 2021-01-11 ENCOUNTER — TRANSCRIPTION ENCOUNTER (OUTPATIENT)
Age: 71
End: 2021-01-11

## 2021-01-11 LAB
ALBUMIN SERPL ELPH-MCNC: 2.7 G/DL — LOW (ref 3.3–5)
ALP SERPL-CCNC: 57 U/L — SIGNIFICANT CHANGE UP (ref 40–120)
ALT FLD-CCNC: 40 U/L — SIGNIFICANT CHANGE UP (ref 10–45)
AST SERPL-CCNC: 40 U/L — SIGNIFICANT CHANGE UP (ref 10–40)
BILIRUB DIRECT SERPL-MCNC: 0.2 MG/DL — SIGNIFICANT CHANGE UP (ref 0–0.2)
BILIRUB INDIRECT FLD-MCNC: 0.2 MG/DL — SIGNIFICANT CHANGE UP (ref 0.2–1)
BILIRUB SERPL-MCNC: 0.4 MG/DL — SIGNIFICANT CHANGE UP (ref 0.2–1.2)
CREAT SERPL-MCNC: 1.1 MG/DL — SIGNIFICANT CHANGE UP (ref 0.5–1.3)
PROT SERPL-MCNC: 6.7 G/DL — SIGNIFICANT CHANGE UP (ref 6–8.3)

## 2021-01-11 PROCEDURE — 99232 SBSQ HOSP IP/OBS MODERATE 35: CPT | Mod: CS

## 2021-01-11 RX ADMIN — Medication 6 MILLIGRAM(S): at 06:02

## 2021-01-11 RX ADMIN — AMLODIPINE BESYLATE 5 MILLIGRAM(S): 2.5 TABLET ORAL at 06:02

## 2021-01-11 RX ADMIN — ENOXAPARIN SODIUM 40 MILLIGRAM(S): 100 INJECTION SUBCUTANEOUS at 11:49

## 2021-01-11 RX ADMIN — Medication 25 MILLIGRAM(S): at 06:02

## 2021-01-11 RX ADMIN — ZINC SULFATE TAB 220 MG (50 MG ZINC EQUIVALENT) 220 MILLIGRAM(S): 220 (50 ZN) TAB at 11:49

## 2021-01-11 RX ADMIN — REMDESIVIR 500 MILLIGRAM(S): 5 INJECTION INTRAVENOUS at 17:33

## 2021-01-11 RX ADMIN — Medication 81 MILLIGRAM(S): at 11:49

## 2021-01-11 RX ADMIN — Medication 25 MILLIGRAM(S): at 17:33

## 2021-01-11 RX ADMIN — Medication 1000 UNIT(S): at 11:48

## 2021-01-11 RX ADMIN — Medication 500 MILLIGRAM(S): at 11:49

## 2021-01-11 NOTE — DISCHARGE NOTE PROVIDER - HOSPITAL COURSE
Hospital Course:      Source of Infection:  Antibiotic / Last Day:    Palliative Care / Advanced Care Planning  Code Status:  Patient/Family agreeable to Hospice/Palliative (Y/N)?  Summary of Goals of Care Conversation:    Discharging Provider: Lori Ventura MD  Contact Info: [phone]- Please call with any questions or concerns.    Outpatient Provider:    Signout given to  SNF Provider:       Hospital Course:    70 male with pmh colon adenocarcinoma, oligo-metastatic to liver (3/2014), and metastatic to lung (2016), s/p lung metastasectomy (2/2017), last chemo with capecitabine (8/2015), htn, hld, presents with sob, progressive BRYAN, fatigue, malaise, poor po intake admitted with Covid pneumonia with hypoxia, KARYNA and electrolyte abnormalities. Completed 5 days of Remdesivir, Remains on a prolonged course of steroids due to high oxygen demands. + Leukocytosis likely steroid induced, will taper. Blood cultures negative. Inflammatory markers remain elevated.  Patient prefers to go home, medically optimized to go on home O2. Son Александр made aware.     At rest: spO2 95% 4L NC   Ambulation: spO2 87% 4L NC    Patient will require home oxygen upon discharge.      Discharging Provider: Evens Martinez NP  Contact Info: [phone]- Please call with any questions or concerns.    Outpatient Provider: Dr. Almonte            Hospital Course:    70 male with pmh colon adenocarcinoma, oligo-metastatic to liver (3/2014), and metastatic to lung (2016), s/p lung metastasectomy (2/2017), last chemo with capecitabine (8/2015), HTN, HLD presents with SOB, progressive BRYAN, fatigue, malaise, poor po intake admitted for severe sepsis and acute hypoxic respiratory failure due to COVID-19 pneumonia. Patient also found with KARYNA, hyponatremia, and hypokalemia likely due to dehydration. Completed course of remdesivir and recommended prolonged decadron course per pulmonology recommendations. Leukocytosis likely steroid induced, will taper upon discharge. Blood cultures negative. Inflammatory markers remain elevated.  CTA chest negative for PE, but suggestive of pulmonary fibrosis. Additionally started on symbicort. KARYNA improved with gentle hydration. Home meds valsartan and hctz held on admission due to KARYNA and controlled htn. Patient prefers to go home, medically optimized to go on 4L home O2 to maintain O2 sat > 92%. Son Александр made aware.     At rest: spO2 95% 4L NC   Ambulation: spO2 87% 4L NC  Patient will require home oxygen upon discharge.    Discharging Provider: Evens Martinez NP  Contact Info: [phone]- Please call with any questions or concerns.    Outpatient Provider: Dr. Almonte     Time spent: >35 minutes

## 2021-01-11 NOTE — PROGRESS NOTE ADULT - ASSESSMENT
70 male with pmh colon adenocarcinoma, oligo-metastatic to liver (3/2014), and metastatic to lung (2016), s/p lung metastaectomy (2/2017), last chemo with capecitabine (8/2015), htn, hld, presents with sob, progressive BRYAN, fatigue, malaise, poor po intake admitted with Covid pneumonia with hypoxia, KARYNA and electrolyte abnormalities.    # Severe sepsis and acute hypoxic respiratory failure 2/2 Covid pneumonia   # Lactic acidosis; resolved  -Remdesivir day 5/5, Decadron 5/10  -down to 3 L NC  -PT consult  -will discharge with decadron and famotidine.  -cont supplemental vitamins, incentive spirometry, self proning.     # KARYNA  # Hyponatremia  -Resolved  -Monitor    # Hypokalemia  replete prn  monitor    # Steroid induced leukocytosis  -Resolved  -Monitor    # CAD  # HTN  chronic conditions  continue asa 81 daily  norvasc 5 daily, metoprolol 25 BID  holding valsartan and HCTZ 25 mg qd 2/2 KARYNA (on admission) - BP has been stable without, consider restarting when BP permits.    # H/O Colon Adenocarcinoma with mets  in remission with no evidence of recurrent dz  follows heme/onc as outpatient, last visit 1/5/21    # C  full code  spoke with son Miguel [phone] and updated him. Plan to self isolate in house to protect rest of family.  primary care doctor: Dr. Figueroa 70 male with pmh colon adenocarcinoma, oligo-metastatic to liver (3/2014), and metastatic to lung (2016), s/p lung metastaectomy (2/2017), last chemo with capecitabine (8/2015), htn, hld, presents with sob, progressive BRYAN, fatigue, malaise, poor po intake admitted with Covid pneumonia with hypoxia, KARYNA and electrolyte abnormalities.    # Severe sepsis and acute hypoxic respiratory failure 2/2 Covid pneumonia   # Lactic acidosis; resolved  -Remdesivir day 5/5, Decadron 5/10  -down to 3 L NC  -PT consult  -will discharge with decadron and famotidine.  -cont supplemental vitamins, incentive spirometry, self proning.   - pt already on aspirin - will not d/c with additional DVT ppx.    # KARYNA  # Hyponatremia  -Resolved  -Monitor    # Hypokalemia  replete prn  monitor    # Steroid induced leukocytosis  -Resolved  -Monitor    # CAD  # HTN  chronic conditions  continue asa 81 daily  norvasc 5 daily, metoprolol 25 BID  holding valsartan and HCTZ 25 mg qd 2/2 KARYNA (on admission) - BP has been stable without, consider restarting when BP permits.    # H/O Colon Adenocarcinoma with mets  in remission with no evidence of recurrent dz  follows heme/onc as outpatient, last visit 1/5/21    # [facility]  full code  spoke with son Miguel [phone] and updated him. Plan to self isolate in house to protect rest of family.  primary care doctor: Dr. Figueroa

## 2021-01-11 NOTE — CHART NOTE - NSCHARTNOTEFT_GEN_A_CORE
Mr. Adams requires supplemental oxygen for diagnosis of acute hypoxic respiratory failure secondary to COVID-19 pneumonia.     Off of supplemental O2, the Patient is 85% on room air with sitting. He decreases even further to 84% with  ambulation. At this time, he requires 3 L to maintain an oxygen saturation of 92%.

## 2021-01-11 NOTE — PROVIDER CONTACT NOTE (OTHER) - ACTION/TREATMENT ORDERED:
PA made aware. no orders. continue to monitor
DIEGO Foreman ordered to reschedule AM metoprolol and amlodipine for 10:00AM.

## 2021-01-11 NOTE — PROGRESS NOTE ADULT - SUBJECTIVE AND OBJECTIVE BOX
Patient is a 70y old  Male who presents with a chief complaint of Acute respiratory failure due to COVID (2021 12:35)      24 HOUR EVENTS:  No overnight events reported.     SUBJECTIVE:  Patient seen and examined at bedside.     ALLERGIES:  No Known Allergies    MEDICATIONS  (STANDING):  amLODIPine   Tablet 5 milliGRAM(s) Oral daily  ascorbic acid 500 milliGRAM(s) Oral daily  aspirin enteric coated 81 milliGRAM(s) Oral daily  cholecalciferol 1000 Unit(s) Oral daily  dexAMETHasone     Tablet 6 milliGRAM(s) Oral daily  enoxaparin Injectable 40 milliGRAM(s) SubCutaneous daily  influenza   Vaccine 0.5 milliLiter(s) IntraMuscular once  metoprolol tartrate 25 milliGRAM(s) Oral two times a day  remdesivir  IVPB 100 milliGRAM(s) IV Intermittent every 24 hours  remdesivir  IVPB   IV Intermittent   sodium chloride 0.9%. 1000 milliLiter(s) (50 mL/Hr) IV Continuous <Continuous>  zinc sulfate 220 milliGRAM(s) Oral daily    MEDICATIONS  (PRN):  acetaminophen   Tablet .. 650 milliGRAM(s) Oral every 6 hours PRN Temp greater or equal to 38C (100.4F), Moderate Pain (4 - 6)    Vital Signs Last 24 Hrs  T(F): 97.3 (2021 05:41), Max: 97.6 (10 Ovi 2021 15:53)  HR: 64 (2021 05:41) (64 - 76)  BP: 119/57 (2021 05:41) (110/50 - 136/82)  RR: 20 (2021 10:04) (16 - 20)  SpO2: 85% (2021 10:04) (84% - 95%)  I&O's Summary    10 Ovi 2021 07:01  -  2021 07:00  --------------------------------------------------------  IN: 1870 mL / OUT: 0 mL / NET: 1870 mL    2021 07:01  -  2021 12:40  --------------------------------------------------------  IN: 920 mL / OUT: 0 mL / NET: 920 mL      PHYSICAL EXAM:  General: NAD, A/O x 3  ENT: Moist mucous membranes, no thrush  Neck: Supple, No JVD  Lungs: Clear to auscultation bilaterally, good air entry, non-labored breathing  Cardio: RRR, S1/S2, No murmur  Abdomen: Soft, Nontender, Nondistended; Bowel sounds present  Extremities: No calf tenderness, No pitting edema    LABS:                        13.6   10.47 )-----------( 245      ( 10 Ovi 2021 07:00 )             39.2         x   |  x   |  x   ----------------------------<  x   x    |  x   |  1.10    Ca    8.7      10 Ovi 2021 07:00  Mg     2.3     01-10    TPro  6.7  /  Alb  2.7  /  TBili  0.4  /  DBili  0.2  /  AST  40  /  ALT  40  /  AlkPhos  57          eGFR if Non African American: 67 mL/min/1.73M2 (21 @ 07:07)  eGFR if : 78 mL/min/1.73M2 (21 @ 07:07)          CARDIAC MARKERS ( 2021 15:12 )  <.017 ng/mL / x     / x     / x     / x              Urinalysis Basic - ( 2021 16:25 )    Color: Yellow / Appearance: Clear / S.015 / pH: x  Gluc: x / Ketone: Negative  / Bili: Negative / Urobili: Negative   Blood: x / Protein: 15 / Nitrite: Negative   Leuk Esterase: Negative / RBC: 5-10 /HPF / WBC 0-2 /HPF   Sq Epi: x / Non Sq Epi: Neg.-Few / Bacteria: Negative /HPF        Culture - Urine (collected 2021 16:25)  Source: .Urine Clean Catch (Midstream)  Final Report (2021 17:06):    <10,000 CFU/mL Normal Urogenital Eden    Culture - Blood (collected 2021 14:35)  Source: .Blood Blood-Peripheral  Preliminary Report (2021 19:02):    No growth to date.    Culture - Blood (collected 2021 14:30)  Source: .Blood Blood-Peripheral  Preliminary Report (2021 19:02):    No growth to date.      RADIOLOGY & ADDITIONAL TESTS:    Care Discussed with Consultants/Other Providers:    Patient is a 70y old  Male who presents with a chief complaint of Acute respiratory failure due to COVID (2021 12:35)      24 HOUR EVENTS:  No overnight events reported.     SUBJECTIVE:  Patient seen and examined at bedside. He overall feels improved. Overall minor cough. Denies being in any pain.     ALLERGIES:  No Known Allergies    MEDICATIONS  (STANDING):  amLODIPine   Tablet 5 milliGRAM(s) Oral daily  ascorbic acid 500 milliGRAM(s) Oral daily  aspirin enteric coated 81 milliGRAM(s) Oral daily  cholecalciferol 1000 Unit(s) Oral daily  dexAMETHasone     Tablet 6 milliGRAM(s) Oral daily  enoxaparin Injectable 40 milliGRAM(s) SubCutaneous daily  influenza   Vaccine 0.5 milliLiter(s) IntraMuscular once  metoprolol tartrate 25 milliGRAM(s) Oral two times a day  remdesivir  IVPB 100 milliGRAM(s) IV Intermittent every 24 hours  remdesivir  IVPB   IV Intermittent   sodium chloride 0.9%. 1000 milliLiter(s) (50 mL/Hr) IV Continuous <Continuous>  zinc sulfate 220 milliGRAM(s) Oral daily    MEDICATIONS  (PRN):  acetaminophen   Tablet .. 650 milliGRAM(s) Oral every 6 hours PRN Temp greater or equal to 38C (100.4F), Moderate Pain (4 - 6)    Vital Signs Last 24 Hrs  T(F): 97.3 (2021 05:41), Max: 97.6 (10 Ovi 2021 15:53)  HR: 64 (2021 05:41) (64 - 76)  BP: 119/57 (2021 05:41) (110/50 - 136/82)  RR: 20 (2021 10:04) (16 - 20)  SpO2: 85% (2021 10:04) (84% - 95%)  I&O's Summary    10 Ovi 2021 07:01  -  2021 07:00  --------------------------------------------------------  IN: 1870 mL / OUT: 0 mL / NET: 1870 mL    2021 07:01  -  2021 12:40  --------------------------------------------------------  IN: 920 mL / OUT: 0 mL / NET: 920 mL      PHYSICAL EXAM:  General: NAD, A/O x 3  ENT: Moist mucous membranes, no thrush  Neck: Supple, No JVD  Lungs: Clear to auscultation bilaterally, good air entry, non-labored breathing, wearing supplemental O2.   Cardio: RRR, S1/S2, No murmur  Abdomen: Soft, Nontender, Nondistended; Bowel sounds present  Extremities: No calf tenderness, No pitting edema    LABS:                        13.6   10.47 )-----------( 245      ( 10 Voi 2021 07:00 )             39.2         x   |  x   |  x   ----------------------------<  x   x    |  x   |  1.10    Ca    8.7      10 Ovi 2021 07:00  Mg     2.3     01-10    TPro  6.7  /  Alb  2.7  /  TBili  0.4  /  DBili  0.2  /  AST  40  /  ALT  40  /  AlkPhos  57          eGFR if Non African American: 67 mL/min/1.73M2 (21 @ 07:07)  eGFR if : 78 mL/min/1.73M2 (21 @ 07:07)          CARDIAC MARKERS ( 2021 15:12 )  <.017 ng/mL / x     / x     / x     / x              Urinalysis Basic - ( 2021 16:25 )    Color: Yellow / Appearance: Clear / S.015 / pH: x  Gluc: x / Ketone: Negative  / Bili: Negative / Urobili: Negative   Blood: x / Protein: 15 / Nitrite: Negative   Leuk Esterase: Negative / RBC: 5-10 /HPF / WBC 0-2 /HPF   Sq Epi: x / Non Sq Epi: Neg.-Few / Bacteria: Negative /HPF        Culture - Urine (collected 2021 16:25)  Source: .Urine Clean Catch (Midstream)  Final Report (2021 17:06):    <10,000 CFU/mL Normal Urogenital Eden    Culture - Blood (collected 2021 14:35)  Source: .Blood Blood-Peripheral  Preliminary Report (2021 19:02):    No growth to date.    Culture - Blood (collected 2021 14:30)  Source: .Blood Blood-Peripheral  Preliminary Report (2021 19:02):    No growth to date.      RADIOLOGY & ADDITIONAL TESTS:  no new imaging     Patient is a 70y old  Male who presents with a chief complaint of Acute respiratory failure due to COVID (2021 12:35)      24 HOUR EVENTS:  No overnight events reported.     SUBJECTIVE:  Patient seen and examined at bedside. He overall feels improved. He has a minor cough. Denies being in any pain.   I talked w/ patient's son Miguel - he agrees that pt is "not a complainer."    ALLERGIES:  No Known Allergies    MEDICATIONS  (STANDING):  amLODIPine   Tablet 5 milliGRAM(s) Oral daily  ascorbic acid 500 milliGRAM(s) Oral daily  aspirin enteric coated 81 milliGRAM(s) Oral daily  cholecalciferol 1000 Unit(s) Oral daily  dexAMETHasone     Tablet 6 milliGRAM(s) Oral daily  enoxaparin Injectable 40 milliGRAM(s) SubCutaneous daily  influenza   Vaccine 0.5 milliLiter(s) IntraMuscular once  metoprolol tartrate 25 milliGRAM(s) Oral two times a day  remdesivir  IVPB 100 milliGRAM(s) IV Intermittent every 24 hours  remdesivir  IVPB   IV Intermittent   sodium chloride 0.9%. 1000 milliLiter(s) (50 mL/Hr) IV Continuous <Continuous>  zinc sulfate 220 milliGRAM(s) Oral daily    MEDICATIONS  (PRN):  acetaminophen   Tablet .. 650 milliGRAM(s) Oral every 6 hours PRN Temp greater or equal to 38C (100.4F), Moderate Pain (4 - 6)    Vital Signs Last 24 Hrs  T(F): 97.3 (2021 05:41), Max: 97.6 (10 Ovi 2021 15:53)  HR: 64 (2021 05:41) (64 - 76)  BP: 119/57 (2021 05:41) (110/50 - 136/82)  RR: 20 (2021 10:04) (16 - 20)  SpO2: 85% (2021 10:04) (84% - 95%)  I&O's Summary    10 Ovi 2021 07:01  -  2021 07:00  --------------------------------------------------------  IN: 1870 mL / OUT: 0 mL / NET: 1870 mL    2021 07:01  -  2021 12:40  --------------------------------------------------------  IN: 920 mL / OUT: 0 mL / NET: 920 mL      PHYSICAL EXAM:  General: NAD, A/O x 3  ENT: Moist mucous membranes, no thrush  Neck: Supple, No JVD  Lungs: Clear to auscultation bilaterally, good air entry, non-labored breathing, wearing supplemental O2.   Cardio: RRR, S1/S2, No murmur  Abdomen: Soft, Nontender, Nondistended; Bowel sounds present  Extremities: No calf tenderness, No pitting edema    LABS:                        13.6   10.47 )-----------( 245      ( 10 Ovi 2021 07:00 )             39.2         x   |  x   |  x   ----------------------------<  x   x    |  x   |  1.10    Ca    8.7      10 Ovi 2021 07:00  Mg     2.3     01-10    TPro  6.7  /  Alb  2.7  /  TBili  0.4  /  DBili  0.2  /  AST  40  /  ALT  40  /  AlkPhos  57          eGFR if Non African American: 67 mL/min/1.73M2 (21 @ 07:07)  eGFR if : 78 mL/min/1.73M2 (21 @ 07:07)          CARDIAC MARKERS ( 2021 15:12 )  <.017 ng/mL / x     / x     / x     / x              Urinalysis Basic - ( 2021 16:25 )    Color: Yellow / Appearance: Clear / S.015 / pH: x  Gluc: x / Ketone: Negative  / Bili: Negative / Urobili: Negative   Blood: x / Protein: 15 / Nitrite: Negative   Leuk Esterase: Negative / RBC: 5-10 /HPF / WBC 0-2 /HPF   Sq Epi: x / Non Sq Epi: Neg.-Few / Bacteria: Negative /HPF        Culture - Urine (collected 2021 16:25)  Source: .Urine Clean Catch (Midstream)  Final Report (2021 17:06):    <10,000 CFU/mL Normal Urogenital Eden    Culture - Blood (collected 2021 14:35)  Source: .Blood Blood-Peripheral  Preliminary Report (2021 19:02):    No growth to date.    Culture - Blood (collected 2021 14:30)  Source: .Blood Blood-Peripheral  Preliminary Report (2021 19:02):    No growth to date.      RADIOLOGY & ADDITIONAL TESTS:  no new imaging

## 2021-01-11 NOTE — DISCHARGE NOTE PROVIDER - NSDCCPCAREPLAN_GEN_ALL_CORE_FT
PRINCIPAL DISCHARGE DIAGNOSIS  Diagnosis: Pneumonia due to COVID-19 virus  Assessment and Plan of Treatment: You completed a course of Remdesivir and have remained on an exteded course of steroids which will taper upon your discharge. You will also remain on Xarelto which is a blood thinner for 30 days. You will require 4L of oxygen at home. Maintain your spO2 (oxygen level) above 92%. Continue deep breathing excercises 10x per hour. Eat a healthy diet and drink plenty of fluids. Remain in quarantine for 21 days since your first symptoms or 3 days symptom free without medication. Follow up with Dr. Figueroa later this week.      SECONDARY DISCHARGE DIAGNOSES  Diagnosis: Hypoxia  Assessment and Plan of Treatment:

## 2021-01-11 NOTE — DISCHARGE NOTE PROVIDER - NSDCFUSCHEDAPPT_GEN_ALL_CORE_FT
MORALES MORILLO ; 03/29/2021 ; [facility] [address] [place] [address] Ave MORALES MORILLO ; 01/22/2021 ; [facility] [address]  MORALES MORILLO ; 03/29/2021 ; [facility] [address]

## 2021-01-11 NOTE — DISCHARGE NOTE PROVIDER - CARE PROVIDER_API CALL
Gabe Figueroa)  Medicine  [address], [place] [address]  Phone: [phone]  Fax: [phone]  Follow Up Time:

## 2021-01-11 NOTE — DISCHARGE NOTE PROVIDER - NSDCMRMEDTOKEN_GEN_ALL_CORE_FT
amLODIPine 5 mg oral tablet: orally 2 times a day  aspirin 81 mg oral tablet: 1 tab(s) orally once a day - last dose 12/29/2016  losartan-hydrochlorothiazide 100mg-12.5mg oral tablet: 1 tab(s) orally once a day  metoprolol tartrate 25 mg oral tablet: 1 tab(s) orally 3 times a day  Vitamin B12:   Vitamin C: 1 tab(s) orally once a day  Vitamin D3: 1 tab(s) orally once a day   amLODIPine 5 mg oral tablet: orally 2 times a day  aspirin 81 mg oral tablet: 1 tab(s) orally once a day - last dose 12/29/2016  budesonide-formoterol 80 mcg-4.5 mcg/inh inhalation aerosol: 2 puff(s) inhaled 2 times a day  dexamethasone 1 mg oral tablet: 6 tab(s) orally once a day x 1 days  5 tab(s) orally once a day x 1 days  4 tab(s) orally once a day x 1 days  3 tab(s) orally once a day x 1 days  2 tab(s) orally once a day x 1 days  1 tab(s) orally once a day x 1 days  losartan-hydrochlorothiazide 100mg-12.5mg oral tablet: 1 tab(s) orally once a day  metoprolol tartrate 25 mg oral tablet: 1 tab(s) orally 3 times a day  Vitamin B12:   Vitamin C: 1 tab(s) orally once a day  Vitamin D3: 1 tab(s) orally once a day  Xarelto 10 mg oral tablet: 1 tab(s) orally once a day

## 2021-01-12 LAB
ALBUMIN SERPL ELPH-MCNC: 2.4 G/DL — LOW (ref 3.3–5)
ALP SERPL-CCNC: 58 U/L — SIGNIFICANT CHANGE UP (ref 40–120)
ALT FLD-CCNC: 40 U/L — SIGNIFICANT CHANGE UP (ref 10–45)
AST SERPL-CCNC: 35 U/L — SIGNIFICANT CHANGE UP (ref 10–40)
BILIRUB DIRECT SERPL-MCNC: 0.2 MG/DL — SIGNIFICANT CHANGE UP (ref 0–0.2)
BILIRUB INDIRECT FLD-MCNC: 0.2 MG/DL — SIGNIFICANT CHANGE UP (ref 0.2–1)
BILIRUB SERPL-MCNC: 0.4 MG/DL — SIGNIFICANT CHANGE UP (ref 0.2–1.2)
CREAT SERPL-MCNC: 0.96 MG/DL — SIGNIFICANT CHANGE UP (ref 0.5–1.3)
CULTURE RESULTS: SIGNIFICANT CHANGE UP
CULTURE RESULTS: SIGNIFICANT CHANGE UP
PROT SERPL-MCNC: 6.2 G/DL — SIGNIFICANT CHANGE UP (ref 6–8.3)
SPECIMEN SOURCE: SIGNIFICANT CHANGE UP
SPECIMEN SOURCE: SIGNIFICANT CHANGE UP

## 2021-01-12 PROCEDURE — 99232 SBSQ HOSP IP/OBS MODERATE 35: CPT | Mod: CS

## 2021-01-12 RX ORDER — FAMOTIDINE 10 MG/ML
20 INJECTION INTRAVENOUS DAILY
Refills: 0 | Status: DISCONTINUED | OUTPATIENT
Start: 2021-01-12 | End: 2021-01-19

## 2021-01-12 RX ORDER — GUAIFENESIN/DEXTROMETHORPHAN 600MG-30MG
10 TABLET, EXTENDED RELEASE 12 HR ORAL EVERY 6 HOURS
Refills: 0 | Status: DISCONTINUED | OUTPATIENT
Start: 2021-01-12 | End: 2021-01-19

## 2021-01-12 RX ORDER — REMDESIVIR 5 MG/ML
100 INJECTION INTRAVENOUS EVERY 24 HOURS
Refills: 0 | Status: COMPLETED | OUTPATIENT
Start: 2021-01-12 | End: 2021-01-16

## 2021-01-12 RX ORDER — ACETAMINOPHEN 500 MG
650 TABLET ORAL EVERY 4 HOURS
Refills: 0 | Status: DISCONTINUED | OUTPATIENT
Start: 2021-01-12 | End: 2021-01-12

## 2021-01-12 RX ADMIN — Medication 25 MILLIGRAM(S): at 05:53

## 2021-01-12 RX ADMIN — REMDESIVIR 500 MILLIGRAM(S): 5 INJECTION INTRAVENOUS at 23:33

## 2021-01-12 RX ADMIN — ENOXAPARIN SODIUM 40 MILLIGRAM(S): 100 INJECTION SUBCUTANEOUS at 12:18

## 2021-01-12 RX ADMIN — Medication 1000 UNIT(S): at 12:18

## 2021-01-12 RX ADMIN — Medication 81 MILLIGRAM(S): at 12:18

## 2021-01-12 RX ADMIN — Medication 6 MILLIGRAM(S): at 05:53

## 2021-01-12 RX ADMIN — Medication 25 MILLIGRAM(S): at 17:42

## 2021-01-12 RX ADMIN — AMLODIPINE BESYLATE 5 MILLIGRAM(S): 2.5 TABLET ORAL at 05:53

## 2021-01-12 RX ADMIN — ZINC SULFATE TAB 220 MG (50 MG ZINC EQUIVALENT) 220 MILLIGRAM(S): 220 (50 ZN) TAB at 12:19

## 2021-01-12 RX ADMIN — Medication 500 MILLIGRAM(S): at 12:18

## 2021-01-12 NOTE — PHYSICAL THERAPY INITIAL EVALUATION ADULT - ADDITIONAL COMMENTS
pt reports he lives with wife, 5 steps to enter with rail, previously independent with mobility and ADLs no assistive device

## 2021-01-12 NOTE — PROGRESS NOTE ADULT - SUBJECTIVE AND OBJECTIVE BOX
Patient is a 70y old  Male who presents with a chief complaint of Acute respiratory failure due to COVID (12 Jan 2021 10:09)      24 HOUR EVENTS:  No overnight events reported.   Asked Nursing to bring the patient an incentive spirometer.    SUBJECTIVE:  Patient seen and examined at bedside. He still continues to require 5 L of supplemental O2. He denies feeling short of breath at all. I Discussed with him doing incentive spirometry and self proning whenever in bed. He greatly dislikes self proning. He is anxious to go home.     ALLERGIES:  No Known Allergies    MEDICATIONS  (STANDING):  amLODIPine   Tablet 5 milliGRAM(s) Oral daily  ascorbic acid 500 milliGRAM(s) Oral daily  aspirin enteric coated 81 milliGRAM(s) Oral daily  cholecalciferol 1000 Unit(s) Oral daily  dexAMETHasone     Tablet 6 milliGRAM(s) Oral daily  enoxaparin Injectable 40 milliGRAM(s) SubCutaneous daily  influenza   Vaccine 0.5 milliLiter(s) IntraMuscular once  metoprolol tartrate 25 milliGRAM(s) Oral two times a day  sodium chloride 0.9%. 1000 milliLiter(s) (50 mL/Hr) IV Continuous <Continuous>  zinc sulfate 220 milliGRAM(s) Oral daily    MEDICATIONS  (PRN):  acetaminophen   Tablet .. 650 milliGRAM(s) Oral every 6 hours PRN Temp greater or equal to 38C (100.4F), Moderate Pain (4 - 6)    Vital Signs Last 24 Hrs  T(F): 97.4 (12 Jan 2021 19:41), Max: 97.7 (11 Jan 2021 20:26)  HR: 72 (12 Jan 2021 19:41) (60 - 72)  BP: 120/65 (12 Jan 2021 19:41) (120/65 - 130/58)  RR: 20 (12 Jan 2021 19:41) (18 - 22)  SpO2: 95% (12 Jan 2021 19:41) (92% - 95%)  I&O's Summary    11 Jan 2021 07:01  -  12 Jan 2021 07:00  --------------------------------------------------------  IN: 1720 mL / OUT: 0 mL / NET: 1720 mL    12 Jan 2021 07:01  -  12 Jan 2021 20:25  --------------------------------------------------------  IN: 620 mL / OUT: 0 mL / NET: 620 mL      PHYSICAL EXAM:  General: NAD, A/O x 3  ENT: Moist mucous membranes, no thrush  Neck: Supple, No JVD  Lungs: Clear to auscultation bilaterally, good air entry, non-labored breathing. wearing supplemental O2 with no audible wheezing.   Cardio: RRR, S1/S2, No murmur  Abdomen: Soft, Nontender, Nondistended; Bowel sounds present  Extremities: No calf tenderness, No pitting edema    LABS:                        13.6   10.47 )-----------( 245      ( 10 Ovi 2021 07:00 )             39.2     01-12    x   |  x   |  x   ----------------------------<  x   x    |  x   |  0.96    Ca    8.7      10 Ovi 2021 07:00  Mg     2.3     01-10    TPro  6.2  /  Alb  2.4  /  TBili  0.4  /  DBili  0.2  /  AST  35  /  ALT  40  /  AlkPhos  58  01-12        eGFR if African American: 92 mL/min/1.73M2 (01-12-21 @ 06:45)  eGFR if Non African American: 80 mL/min/1.73M2 (01-12-21 @ 06:45)    Culture - Urine (collected 08 Jan 2021 16:25)  Source: .Urine Clean Catch (Midstream)  Final Report (09 Jan 2021 17:06):    <10,000 CFU/mL Normal Urogenital [place]    Culture - Blood (collected 07 Jan 2021 14:35)  Source: .Blood Blood-Peripheral  Final Report (12 Jan 2021 19:00):    No Growth Final    Culture - Blood (collected 07 Jan 2021 14:30)  Source: .Blood Blood-Peripheral  Final Report (12 Jan 2021 19:00):    No Growth Final

## 2021-01-12 NOTE — PROGRESS NOTE ADULT - ASSESSMENT
70 male with pmh colon adenocarcinoma, oligo-metastatic to liver (3/2014), and metastatic to lung (2016), s/p lung metastaectomy (2/2017), last chemo with capecitabine (8/2015), htn, hld, presents with sob, progressive BRYAN, fatigue, malaise, poor po intake admitted with Covid pneumonia with hypoxia, KARYNA and electrolyte abnormalities.    # Severe sepsis and acute hypoxic respiratory failure 2/2 Covid pneumonia   # Lactic acidosis; resolved  Patient completed 5 days of remdesivir. Decadron day 6 of 10. He was weaned down to 3 L O2 yesterday, but now back up to 5 L. Given no substantial improvement since admission, will extend course of remdesivir until can wean oxygen further.   - famotidine, supplemental vitamins   - robitussin DM PRN  - encouraged Incentive spirometry  - encouraged self proning in bed as much as possible.  - blood cultures no growth to date  - inflammatory markers ordered for the AM.   - pt already on aspirin - will not d/c with additional DVT ppx.    # KARYNA  # Hyponatremia  -Resolved  -Monitor    # Hypokalemia  replete prn  monitor    # Steroid induced leukocytosis  -Resolved  -Monitor    # CAD  # HTN  chronic conditions  continue asa 81 daily  norvasc 5 daily, metoprolol 25 BID  holding valsartan and HCTZ 25 mg qd 2/2 KARYNA (on admission) - BP has been stable without, consider restarting when BP permits.    # H/O Colon Adenocarcinoma with mets  in remission with no evidence of recurrent dz  follows heme/onc as outpatient, last visit 1/5/21    # [facility]  full code  spoke with son Miguel [phone] and updated him last on 1/11. Plan to self isolate in house to protect rest of family.  primary care doctor: Dr. Figueroa

## 2021-01-13 LAB
ALBUMIN SERPL ELPH-MCNC: 2.3 G/DL — LOW (ref 3.3–5)
ALBUMIN SERPL ELPH-MCNC: 2.4 G/DL — LOW (ref 3.3–5)
ALP SERPL-CCNC: 58 U/L — SIGNIFICANT CHANGE UP (ref 40–120)
ALP SERPL-CCNC: 59 U/L — SIGNIFICANT CHANGE UP (ref 40–120)
ALT FLD-CCNC: 38 U/L — SIGNIFICANT CHANGE UP (ref 10–45)
ALT FLD-CCNC: 38 U/L — SIGNIFICANT CHANGE UP (ref 10–45)
ANION GAP SERPL CALC-SCNC: 8 MMOL/L — SIGNIFICANT CHANGE UP (ref 5–17)
AST SERPL-CCNC: 30 U/L — SIGNIFICANT CHANGE UP (ref 10–40)
AST SERPL-CCNC: 31 U/L — SIGNIFICANT CHANGE UP (ref 10–40)
BILIRUB DIRECT SERPL-MCNC: 0.1 MG/DL — SIGNIFICANT CHANGE UP (ref 0–0.2)
BILIRUB DIRECT SERPL-MCNC: 0.1 MG/DL — SIGNIFICANT CHANGE UP (ref 0–0.2)
BILIRUB INDIRECT FLD-MCNC: 0.2 MG/DL — SIGNIFICANT CHANGE UP (ref 0.2–1)
BILIRUB INDIRECT FLD-MCNC: 0.3 MG/DL — SIGNIFICANT CHANGE UP (ref 0.2–1)
BILIRUB SERPL-MCNC: 0.3 MG/DL — SIGNIFICANT CHANGE UP (ref 0.2–1.2)
BILIRUB SERPL-MCNC: 0.4 MG/DL — SIGNIFICANT CHANGE UP (ref 0.2–1.2)
BUN SERPL-MCNC: 37 MG/DL — HIGH (ref 7–23)
CALCIUM SERPL-MCNC: 8.4 MG/DL — SIGNIFICANT CHANGE UP (ref 8.4–10.5)
CHLORIDE SERPL-SCNC: 109 MMOL/L — HIGH (ref 96–108)
CK SERPL-CCNC: 34 U/L — SIGNIFICANT CHANGE UP (ref 30–200)
CO2 SERPL-SCNC: 27 MMOL/L — SIGNIFICANT CHANGE UP (ref 22–31)
CREAT SERPL-MCNC: 0.87 MG/DL — SIGNIFICANT CHANGE UP (ref 0.5–1.3)
CREAT SERPL-MCNC: 0.94 MG/DL — SIGNIFICANT CHANGE UP (ref 0.5–1.3)
CREAT SERPL-MCNC: 0.96 MG/DL — SIGNIFICANT CHANGE UP (ref 0.5–1.3)
CRP SERPL-MCNC: 0.94 MG/DL — HIGH (ref 0–0.4)
D DIMER BLD IA.RAPID-MCNC: 262 NG/ML DDU — HIGH
FERRITIN SERPL-MCNC: 467 NG/ML — HIGH (ref 30–400)
GLUCOSE BLDC GLUCOMTR-MCNC: 212 MG/DL — HIGH (ref 70–99)
GLUCOSE SERPL-MCNC: 141 MG/DL — HIGH (ref 70–99)
HCT VFR BLD CALC: 39.3 % — SIGNIFICANT CHANGE UP (ref 39–50)
HGB BLD-MCNC: 13.4 G/DL — SIGNIFICANT CHANGE UP (ref 13–17)
LDH SERPL L TO P-CCNC: 353 U/L — HIGH (ref 50–242)
MCHC RBC-ENTMCNC: 32.3 PG — SIGNIFICANT CHANGE UP (ref 27–34)
MCHC RBC-ENTMCNC: 34.1 GM/DL — SIGNIFICANT CHANGE UP (ref 32–36)
MCV RBC AUTO: 94.7 FL — SIGNIFICANT CHANGE UP (ref 80–100)
NRBC # BLD: 0 /100 WBCS — SIGNIFICANT CHANGE UP (ref 0–0)
PLATELET # BLD AUTO: 273 K/UL — SIGNIFICANT CHANGE UP (ref 150–400)
POTASSIUM SERPL-MCNC: 4.1 MMOL/L — SIGNIFICANT CHANGE UP (ref 3.5–5.3)
POTASSIUM SERPL-SCNC: 4.1 MMOL/L — SIGNIFICANT CHANGE UP (ref 3.5–5.3)
PROT SERPL-MCNC: 6 G/DL — SIGNIFICANT CHANGE UP (ref 6–8.3)
PROT SERPL-MCNC: 6 G/DL — SIGNIFICANT CHANGE UP (ref 6–8.3)
RBC # BLD: 4.15 M/UL — LOW (ref 4.2–5.8)
RBC # FLD: 12.1 % — SIGNIFICANT CHANGE UP (ref 10.3–14.5)
SODIUM SERPL-SCNC: 144 MMOL/L — SIGNIFICANT CHANGE UP (ref 135–145)
WBC # BLD: 7.54 K/UL — SIGNIFICANT CHANGE UP (ref 3.8–10.5)
WBC # FLD AUTO: 7.54 K/UL — SIGNIFICANT CHANGE UP (ref 3.8–10.5)

## 2021-01-13 PROCEDURE — 99233 SBSQ HOSP IP/OBS HIGH 50: CPT | Mod: CS

## 2021-01-13 RX ADMIN — ENOXAPARIN SODIUM 40 MILLIGRAM(S): 100 INJECTION SUBCUTANEOUS at 15:47

## 2021-01-13 RX ADMIN — Medication 6 MILLIGRAM(S): at 05:37

## 2021-01-13 RX ADMIN — Medication 81 MILLIGRAM(S): at 15:48

## 2021-01-13 RX ADMIN — Medication 25 MILLIGRAM(S): at 05:37

## 2021-01-13 RX ADMIN — FAMOTIDINE 20 MILLIGRAM(S): 10 INJECTION INTRAVENOUS at 15:48

## 2021-01-13 RX ADMIN — AMLODIPINE BESYLATE 5 MILLIGRAM(S): 2.5 TABLET ORAL at 05:37

## 2021-01-13 RX ADMIN — Medication 1000 UNIT(S): at 15:49

## 2021-01-13 RX ADMIN — REMDESIVIR 500 MILLIGRAM(S): 5 INJECTION INTRAVENOUS at 22:40

## 2021-01-13 RX ADMIN — Medication 500 MILLIGRAM(S): at 15:48

## 2021-01-13 RX ADMIN — Medication 25 MILLIGRAM(S): at 17:44

## 2021-01-13 RX ADMIN — ZINC SULFATE TAB 220 MG (50 MG ZINC EQUIVALENT) 220 MILLIGRAM(S): 220 (50 ZN) TAB at 15:49

## 2021-01-13 NOTE — PROGRESS NOTE ADULT - SUBJECTIVE AND OBJECTIVE BOX
Name: MORALES MORILLO  MRN: [id number]  LOCATION: [place] [address]    ----  Patient is a 70y old  Male who presents with a chief complaint of Acute respiratory failure due to COVID (12 Jan 2021 10:09)      FROM ADMISSION H+P:   HPI:  69 y/o M PMH colon adenocarcinoma, oligo-metastatic to liver (3/2014), and metastatic to lung (2016), s/p lung metastaectomy (2/2017), last chemo with Capecitabine (8/2015), HTN, HLD,  c/o SOB, progressive BRYAN, fatigue, malaise, poor PO x 2-3 days. Live at home with wife, unsure of covid positive contacts. ED course significant for hypoxia O2Sat 85% on RA at rest improved to 92-93% on 4L NC. Denies cp, palpitations, nausea, vomiting, abd pain, numbness/tingling in the extremities.    ----  INTERVAL HPI/OVERNIGHT EVENTS:   Pt speaks Greek only. [employer]  services were utilized for my interaction with the patient.  # [id number] Sigrid Bah  Pt seen and evaluated at the bedside. No acute overnight events occurred. The pt has no complaints today. He thinks his breathing is better. He is ambulating to the restroom comfortably. He is agreeable w/ dispo planning to home w/ O2 if needed. Weaned from 5L NC today to 3L NC without issue. Denies CP. Denies pain. Eating well. No new concerns.     ----  PAST MEDICAL & SURGICAL HISTORY:  Lung metastasis    Cataract of left eye    Atherosclerosis    DJD (degenerative joint disease)    Lumbar stenosis    Snoring  DREW precautions -- responds affirmatively to STOP BANG questionnaire -- admits to loud snoring; age &gt; 50; h/o htn; gender, male; h/o htn    Clostridium difficile infection  on short term vancomyacin    Reflux    Depression  sts was depressed &quot;I lost my brother while going to this&quot;    Liver cancer  METS from the colon -- has received chemotherapy    Colon cancer  had surgery in March 2014 with mets to liver-- received chemotherapy    Arthropathy  left hip &gt; right hip    Carotid artery occlusion  left side in November 2013  Left CEA 11/2013    Osteoarthritis    Hyperlipidemia    CVA (cerebral vascular accident)  2005 -- wife and patient state no residual    Hypertension    H/O resection of liver  2015    Colon cancer  [place] 2014 for chemotherapy due to colon cancer with METS to liver    Colon cancer  diagnosed in March 2014 with subsequent colon cancer followed with chemotherapy    H/O skin graft  1984    S/P carotid endarterectomy  11/2013  left side        FAMILY HISTORY:  Family history of stomach cancer (Sibling)    Family history of colon cancer (Sibling)        Allergies    No Known Allergies    Intolerances        ----  ANTIMICROBIALS:  remdesivir  IVPB 100 milliGRAM(s) IV Intermittent every 24 hours    CARDIOVASCULAR:  amLODIPine   Tablet 5 milliGRAM(s) Oral daily  metoprolol tartrate 25 milliGRAM(s) Oral two times a day    GASTROINTESTINAL:  famotidine    Tablet 20 milliGRAM(s) Oral daily    PULMONARY:  guaifenesin/dextromethorphan  Syrup 100 milliLiter(s) Oral every 6 hours PRN      ----  REVIEW OF SYSTEMS:  comprehensive ROS as per HPI     ----  PHYSICAL EXAM:  GENERAL: patient appears comfortable, nontoxic, no distress  ENMT: oropharynx clear without erythema, moist mucous membranes  LUNGS: coarse air entry bilaterally but generally clear to auscultation, no rhonchi, no wheezing  HEART: soft S1/S2, regular rate and rhythm, no murmurs noted, no noted edema to b/l LE  GASTROINTESTINAL: abdomen is soft, nontender, nondistended, normoactive bowel sounds, no palpable masses  MUSCULOSKELETAL: no clubbing or cyanosis, no obvious deformity  NEUROLOGIC: awake, alert, readily interactive, good muscle tone in 4 extremities    T(C): 36.6 (01-13-21 @ 15:39), Max: 36.6 (01-12-21 @ 22:43)  HR: 68 (01-13-21 @ 15:39) (68 - 77)  BP: 136/55 (01-13-21 @ 15:39) (120/65 - 143/67)  RR: 17 (01-13-21 @ 15:39) (17 - 20)  SpO2: 94% (01-13-21 @ 15:39) (94% - 99%)  Wt(kg): --    ----  INTAKE & OUTPUT:  I&O's Summary    12 Jan 2021 07:01  -  13 Jan 2021 07:00  --------------------------------------------------------  IN: 620 mL / OUT: 0 mL / NET: 620 mL        LABS:                        13.4   7.54  )-----------( 273      ( 13 Jan 2021 07:19 )             39.3     01-13    144  |  109<H>  |  37<H>  ----------------------------<  141<H>  4.1   |  27  |  0.87    Ca    8.4      13 Jan 2021 07:19    TPro  6.0  /  Alb  2.4<L>  /  TBili  0.4  /  DBili  0.1  /  AST  30  /  ALT  38  /  AlkPhos  59  01-13        CAPILLARY BLOOD GLUCOSE                ----  COVID specific labs:    Auto Neutrophil #: 9.25 K/uL (01-10-21 @ 07:00)  Auto Neutrophil #: 4.20 K/uL (01-07-21 @ 14:30)    Auto Lymphocyte #: 0.37 K/uL (01-10-21 @ 07:00)  Auto Lymphocyte #: 1.21 K/uL (01-07-21 @ 14:30)    NLR 25    Procalcitonin, Serum: 0.15 ng/mL (01-07-21 @ 14:30)    Ferritin, Serum: 467 ng/mL (01-13-21 @ 07:19)      D-Dimer Assay, Quantitative: 262 ng/mL DDU (01-13-21 @ 07:19)  D-Dimer Assay, Quantitative: 172 ng/mL DDU (01-07-21 @ 14:30)        Creatine Kinase, Serum: 34 U/L (01-13-21 @ 07:19)  Creatine Kinase, Serum: 123 U/L (01-07-21 @ 14:30)        Troponin I, Serum: <.017 ng/mL (01-08-21 @ 15:12)  Troponin I, Serum: <.017 ng/mL (01-08-21 @ 07:15)  Troponin I, Serum: <.017 ng/mL (01-07-21 @ 22:45)  Troponin I, Serum: .017 ng/mL (01-07-21 @ 14:30)    Lactate, Blood: 1.1 mmol/L (01-07-21 @ 22:45)  Lactate, Blood: 2.6 mmol/L (01-07-21 @ 14:30)    Prothrombin Time, Plasma: 15.4 sec (01-07-21 @ 14:30)    Activated Partial Thromboplastin Time: 29.5 sec (01-07-21 @ 14:30)      ----  Personally reviewed:  Vital sign trends: [ x ] yes    [  ] no     [  ] n/a  Laboratory results: [ x ] yes    [  ] no     [  ] n/a  Radiology results: [ x ] yes    [  ] no     [  ] n/a - cxr w/ patchy groundglass opacities b/l  Culture results: [ x ] yes    [  ] no     [  ] n/a  Consultant recommendations: [ x ] yes    [  ] no     [  ] n/a

## 2021-01-13 NOTE — CHART NOTE - NSCHARTNOTEFT_GEN_A_CORE
NUTRITION FOLLOW UP    SOURCE: Patient [X)   Family [ ]     other [ ]    DIET: Dash w/ 1 bottle ensure enlive daily    PATIENT REPORT[ ] nausea  [ ] vomiting [ ] diarrhea [ ] constipation  [ ]chewing problems [ ] swallowing issues  [ ] other: no GI distress    PO INTAKE:  < 50% [ ]   50-75%  [ ]   %  [X]  other : tolerates ensure fairly well    SOURCE: for PO intake [X] Patient [ ] family [ ] chart [ ] staff [ ] other    ENTERAL/PARENTERAL NUTRITION: n/a    CURRENT WEIGHT: Weight (kg): 60.7 (01-07 @ 20:59)     PERTINENT LABS:  Date: 13 Jan 2021 07:19  Hemoglobin 13.4   Hematocrit 39.3     Date: 01-13  Sodium 144  Potassium 4.1  Glucose Serum 141<H>  BUN 37<H>      Creatinine    ACCUCHECK      SKIN: intact, no edema noted, last bm was 1/12    ESTIMATED NEEDS:   [X] no change since previous assessment  [ ] recalculated:     PREVIOUS NUTRITION DIAGNOSIS: addressed    NUTRITION DIAGNOSIS is   [X] resolved, RD will follow as per nutrition department protocol.    NEW NUTRITION DIAGNOSIS: [X] not applicable    MONITORING AND EVALUATION:   Current diet order is appropriate and is well tolerated, but will monitor for any changes that may be needed    [X] PO intake [X] Tolerance to diet prescription [X] weights [X] follow up per protocol    NUTRITION RECOMMENDATIONS:    RD remains available BETZAIDA Marks RD CDE

## 2021-01-13 NOTE — PROGRESS NOTE ADULT - ATTENDING COMMENTS
The admission plan was discussed in detail with the patient and family members. Their questions and concerns were addressed to the best of my ability. They are in agreement with the plan as detailed above. They demonstrated adequate understanding of the counseling which I have provided.

## 2021-01-13 NOTE — PROGRESS NOTE ADULT - ASSESSMENT
70 male with pmh colon adenocarcinoma, oligo-metastatic to liver (3/2014), and metastatic to lung (2016), s/p lung metastaectomy (2/2017), last chemo with capecitabine (8/2015), htn, hld, presents with sob, progressive BRYAN, fatigue, malaise, poor po intake admitted with Covid pneumonia with hypoxia, KARYNA and electrolyte abnormalities.    # Severe sepsis and acute hypoxic respiratory failure 2/2 Covid pneumonia   # Lactic acidosis; resolved  Patient completed 5 days of remdesivir. Decadron day 7 of 10. He was weaned down to 3 L O2 today and tolerated well. Given no substantial improvement since admission, will extend course of remdesivir until can wean oxygen further.   - famotidine, supplemental vitamins   - robitussin DM PRN  - encouraged Incentive spirometry  - encouraged self proning in bed as much as possible.  - blood cultures no growth to date  - inflammatory markers not impressive  - pt already on aspirin - will not d/c with additional DVT ppx.    # KARYNA  # Hyponatremia  -Resolved  -Monitor    # Hypokalemia  replete prn  monitor    # Steroid induced leukocytosis  -Resolved  -Monitor    # CAD  # HTN  chronic conditions  continue asa 81 daily  norvasc 5 daily, metoprolol 25 BID  holding valsartan and HCTZ 25 mg qd 2/2 KARYNA (on admission) - BP has been stable without, consider restarting when BP permits.    # H/O Colon Adenocarcinoma with mets  in remission with no evidence of recurrent dz  follows heme/onc as outpatient, last visit 1/5/21    # GOC  full code  spoke with spouse at pt request today.   Miguel [phone] was last updated on 1/11. Plan to self isolate in house to protect rest of family.  primary care doctor: Dr. Figueroa

## 2021-01-14 LAB
ALBUMIN SERPL ELPH-MCNC: 2.4 G/DL — LOW (ref 3.3–5)
ALP SERPL-CCNC: 58 U/L — SIGNIFICANT CHANGE UP (ref 40–120)
ALT FLD-CCNC: 34 U/L — SIGNIFICANT CHANGE UP (ref 10–45)
ANION GAP SERPL CALC-SCNC: 9 MMOL/L — SIGNIFICANT CHANGE UP (ref 5–17)
AST SERPL-CCNC: 24 U/L — SIGNIFICANT CHANGE UP (ref 10–40)
BASOPHILS # BLD AUTO: 0 K/UL — SIGNIFICANT CHANGE UP (ref 0–0.2)
BASOPHILS NFR BLD AUTO: 0 % — SIGNIFICANT CHANGE UP (ref 0–2)
BILIRUB DIRECT SERPL-MCNC: 0.1 MG/DL — SIGNIFICANT CHANGE UP (ref 0–0.2)
BILIRUB INDIRECT FLD-MCNC: 0.2 MG/DL — SIGNIFICANT CHANGE UP (ref 0.2–1)
BILIRUB SERPL-MCNC: 0.3 MG/DL — SIGNIFICANT CHANGE UP (ref 0.2–1.2)
BUN SERPL-MCNC: 37 MG/DL — HIGH (ref 7–23)
CALCIUM SERPL-MCNC: 8.4 MG/DL — SIGNIFICANT CHANGE UP (ref 8.4–10.5)
CHLORIDE SERPL-SCNC: 110 MMOL/L — HIGH (ref 96–108)
CO2 SERPL-SCNC: 27 MMOL/L — SIGNIFICANT CHANGE UP (ref 22–31)
CREAT SERPL-MCNC: 0.8 MG/DL — SIGNIFICANT CHANGE UP (ref 0.5–1.3)
CREAT SERPL-MCNC: 0.81 MG/DL — SIGNIFICANT CHANGE UP (ref 0.5–1.3)
EOSINOPHIL # BLD AUTO: 0 K/UL — SIGNIFICANT CHANGE UP (ref 0–0.5)
EOSINOPHIL NFR BLD AUTO: 0 % — SIGNIFICANT CHANGE UP (ref 0–6)
GLUCOSE SERPL-MCNC: 116 MG/DL — HIGH (ref 70–99)
HCT VFR BLD CALC: 38.7 % — LOW (ref 39–50)
HGB BLD-MCNC: 13 G/DL — SIGNIFICANT CHANGE UP (ref 13–17)
LYMPHOCYTES # BLD AUTO: 0.56 K/UL — LOW (ref 1–3.3)
LYMPHOCYTES # BLD AUTO: 6 % — LOW (ref 13–44)
MAGNESIUM SERPL-MCNC: 2.3 MG/DL — SIGNIFICANT CHANGE UP (ref 1.6–2.6)
MANUAL SMEAR VERIFICATION: SIGNIFICANT CHANGE UP
MCHC RBC-ENTMCNC: 31.7 PG — SIGNIFICANT CHANGE UP (ref 27–34)
MCHC RBC-ENTMCNC: 33.6 GM/DL — SIGNIFICANT CHANGE UP (ref 32–36)
MCV RBC AUTO: 94.4 FL — SIGNIFICANT CHANGE UP (ref 80–100)
METAMYELOCYTES # FLD: 1 % — HIGH (ref 0–0)
MONOCYTES # BLD AUTO: 1.21 K/UL — HIGH (ref 0–0.9)
MONOCYTES NFR BLD AUTO: 13 % — SIGNIFICANT CHANGE UP (ref 2–14)
MYELOCYTES NFR BLD: 1 % — HIGH (ref 0–0)
NEUTROPHILS # BLD AUTO: 7.37 K/UL — SIGNIFICANT CHANGE UP (ref 1.8–7.4)
NEUTROPHILS NFR BLD AUTO: 79 % — HIGH (ref 43–77)
NRBC # BLD: 0 /100 — SIGNIFICANT CHANGE UP (ref 0–0)
PLAT MORPH BLD: NORMAL — SIGNIFICANT CHANGE UP
PLATELET # BLD AUTO: 320 K/UL — SIGNIFICANT CHANGE UP (ref 150–400)
POTASSIUM SERPL-MCNC: 4.1 MMOL/L — SIGNIFICANT CHANGE UP (ref 3.5–5.3)
POTASSIUM SERPL-SCNC: 4.1 MMOL/L — SIGNIFICANT CHANGE UP (ref 3.5–5.3)
PROT SERPL-MCNC: 5.8 G/DL — LOW (ref 6–8.3)
RBC # BLD: 4.1 M/UL — LOW (ref 4.2–5.8)
RBC # FLD: 12.5 % — SIGNIFICANT CHANGE UP (ref 10.3–14.5)
RBC BLD AUTO: NORMAL — SIGNIFICANT CHANGE UP
SODIUM SERPL-SCNC: 146 MMOL/L — HIGH (ref 135–145)
WBC # BLD: 9.33 K/UL — SIGNIFICANT CHANGE UP (ref 3.8–10.5)
WBC # FLD AUTO: 9.33 K/UL — SIGNIFICANT CHANGE UP (ref 3.8–10.5)

## 2021-01-14 PROCEDURE — 99233 SBSQ HOSP IP/OBS HIGH 50: CPT | Mod: CS

## 2021-01-14 RX ADMIN — Medication 81 MILLIGRAM(S): at 13:41

## 2021-01-14 RX ADMIN — Medication 6 MILLIGRAM(S): at 05:34

## 2021-01-14 RX ADMIN — REMDESIVIR 500 MILLIGRAM(S): 5 INJECTION INTRAVENOUS at 20:36

## 2021-01-14 RX ADMIN — Medication 1000 UNIT(S): at 13:41

## 2021-01-14 RX ADMIN — FAMOTIDINE 20 MILLIGRAM(S): 10 INJECTION INTRAVENOUS at 13:41

## 2021-01-14 RX ADMIN — Medication 25 MILLIGRAM(S): at 05:34

## 2021-01-14 RX ADMIN — Medication 25 MILLIGRAM(S): at 17:56

## 2021-01-14 RX ADMIN — ZINC SULFATE TAB 220 MG (50 MG ZINC EQUIVALENT) 220 MILLIGRAM(S): 220 (50 ZN) TAB at 13:41

## 2021-01-14 RX ADMIN — ENOXAPARIN SODIUM 40 MILLIGRAM(S): 100 INJECTION SUBCUTANEOUS at 13:41

## 2021-01-14 RX ADMIN — Medication 500 MILLIGRAM(S): at 13:41

## 2021-01-14 RX ADMIN — AMLODIPINE BESYLATE 5 MILLIGRAM(S): 2.5 TABLET ORAL at 05:34

## 2021-01-14 NOTE — PROGRESS NOTE ADULT - ASSESSMENT
70 male with pmh colon adenocarcinoma, oligo-metastatic to liver (3/2014), and metastatic to lung (2016), s/p lung metastaectomy (2/2017), last chemo with capecitabine (8/2015), htn, hld, presents with sob, progressive BRYAN, fatigue, malaise, poor po intake admitted with Covid pneumonia with hypoxia, KARYNA and electrolyte abnormalities.    # Severe sepsis and acute hypoxic respiratory failure 2/2 Covid pneumonia   # Lactic acidosis; resolved  - Patient completed 5 days of remdesivir. Continue w/ extended course of remdesivir until can wean oxygen further.   - Decadron day 8 of 10  - famotidine, supplemental vitamins   - robitussin DM PRN  - encouraged Incentive spirometry  - encouraged self proning in bed as much as possible.  - blood cultures no growth to date  - inflammatory markers not impressively elevated  - pt already on aspirin 81mg, will have to consider extended course of VTE ppx    # KARYNA  # Hyponatremia  -Resolved  -Monitor  -Now sodium uptrending, suspect likely hypovolemic hyponatremia, will trend, encourage po intake    # Hypokalemia  -replete prn. resolved. monitor q daily    # Steroid induced leukocytosis  -Resolved, Monitor, afebrile, no evidence of uncontrolled secondary infectious process. can check procal as needed    # CAD  # HTN  chronic conditions  continue asa 81 daily  norvasc 5 daily, metoprolol 25 BID  holding valsartan and HCTZ 25 mg qd 2/2 KARYNA (on admission) - BP has essentially been at a reasonable goal of SBP~140    # H/O Colon Adenocarcinoma with mets  in remission with no evidence of recurrent dz  follows heme/onc as outpatient, last visit 1/5/21    # GOC  full code  - Spoke w/ son, Александр [phone] today. discussed the pt's frustration w/ slow progress. He asked that I speak w/ the pt's PCP who knows him well and hopefully can explain the circumstances to the patient in his native language. Will try to call PCP today as well.   primary care doctor: Dr. Figueroa

## 2021-01-14 NOTE — PROGRESS NOTE ADULT - SUBJECTIVE AND OBJECTIVE BOX
Name: MORALES MORILLO  MRN: [id number]  LOCATION: [place] [address]    ----  Patient is a 70y old  Male who presents with a chief complaint of Acute respiratory failure due to COVID (13 Jan 2021 16:20)      FROM ADMISSION H+P:   HPI:  71 y/o M PMH colon adenocarcinoma, oligo-metastatic to liver (3/2014), and metastatic to lung (2016), s/p lung metastaectomy (2/2017), last chemo with Capecitabine (8/2015), HTN, HLD,  c/o SOB, progressive BRYAN, fatigue, malaise, poor PO x 2-3 days. Live at home with wife, unsure of covid positive contacts. ED course significant for hypoxia O2Sat 85% on RA at rest improved to 92-93% on 4L NC. Denies cp, palpitations, nausea, vomiting, abd pain, numbness/tingling in the extremities.  Fam hx - unknown (07 Jan 2021 17:31)      ----  INTERVAL HPI/OVERNIGHT EVENTS:  Pt speaks Cook Islander only. [employer]  services were utilized for my interaction with the patient.  # [id number] - Taylor  Pt seen and evaluated at the bedside. No acute overnight events occurred. The patient sat in chair today with nursing and desatted into mid-80's w/ increased work of breathing. O2 increased to 6L NC and he's maintaining about 88-91% now. Pt is obviously frustrated with having been hospitalized this long and he feels "better". He dismisses most of my questions and gestured as if he is fed up w/ me. I attempted to provide some emotional support and encouragement but he did not appear receptive. No other new complaints. Denies pain in chest or abdomen. Denies nausea or vomiting. Denies edema in legs. Appears a little tired.     ----  PAST MEDICAL & SURGICAL HISTORY:  Lung metastasis    Cataract of left eye    Atherosclerosis    DJD (degenerative joint disease)    Lumbar stenosis    Snoring  DREW precautions -- responds affirmatively to STOP BANG questionnaire -- admits to loud snoring; age &gt; 50; h/o htn; gender, male; h/o htn    Clostridium difficile infection  on short term vancomyacin    Reflux    Depression  sts was depressed &quot;I lost my brother while going to this&quot;    Liver cancer  METS from the colon -- has received chemotherapy    Colon cancer  had surgery in March 2014 with mets to liver-- received chemotherapy    Arthropathy  left hip &gt; right hip    Carotid artery occlusion  left side in November 2013  Left CEA 11/2013    Osteoarthritis    Hyperlipidemia    CVA (cerebral vascular accident)  2005 -- wife and patient state no residual    Hypertension    H/O resection of liver  2015    [facility] due to colon cancer with METS to liver    Colon cancer  diagnosed in March 2014 with subsequent colon cancer followed with chemotherapy    H/O skin graft  1984    S/P carotid endarterectomy  11/2013  left side        FAMILY HISTORY:  Family history of stomach cancer (Sibling)    Family history of colon cancer (Sibling)        Allergies    No Known Allergies    Intolerances        ----  ANTIMICROBIALS:  remdesivir  IVPB 100 milliGRAM(s) IV Intermittent every 24 hours    CARDIOVASCULAR:  amLODIPine   Tablet 5 milliGRAM(s) Oral daily  metoprolol tartrate 25 milliGRAM(s) Oral two times a day    GASTROINTESTINAL:  famotidine    Tablet 20 milliGRAM(s) Oral daily    PULMONARY:  guaifenesin/dextromethorphan  Syrup 100 milliLiter(s) Oral every 6 hours PRN      ----  REVIEW OF SYSTEMS:  comprehensive ROS as per HPI     ----  PHYSICAL EXAM:  GENERAL: patient appears comfortable, nontoxic, no distress, speaking in full sentences without distress  ENMT: oropharynx clear without erythema, moist mucous membranes  LUNGS: coarse air entry bilaterally but generally clear to auscultation, soft bibasilar rhonchi noted  HEART: soft S1/S2, regular rate and rhythm, no murmurs noted, no noted edema to b/l LE  GASTROINTESTINAL: abdomen is soft, nontender, nondistended, normoactive bowel sounds, no palpable masses  MUSCULOSKELETAL: no clubbing or cyanosis, no obvious deformity  VASC: nail beds appear pale, borderline cyanotic and cool to touch  NEUROLOGIC: awake, alert, readily interactive, good muscle tone in 4 extremities    T(C): 36.4 (01-14-21 @ 04:27), Max: 36.6 (01-13-21 @ 15:39)  HR: 74 (01-14-21 @ 04:27) (66 - 74)  BP: 142/66 (01-14-21 @ 04:27) (136/55 - 142/66)  RR: 19 (01-14-21 @ 04:27) (17 - 19)  SpO2: 94% (01-14-21 @ 04:27) (94% - 96%)  Wt(kg): --    ----  INTAKE & OUTPUT:  I&O's Summary    13 Jan 2021 07:01  -  14 Jan 2021 07:00  --------------------------------------------------------  IN: 240 mL / OUT: 0 mL / NET: 240 mL    14 Jan 2021 07:01  -  14 Jan 2021 11:34  --------------------------------------------------------  IN: 400 mL / OUT: 0 mL / NET: 400 mL        LABS:                        13.0   9.33  )-----------( 320      ( 14 Jan 2021 07:15 )             38.7     01-14    146<H>  |  110<H>  |  37<H>  ----------------------------<  116<H>  4.1   |  27  |  0.81    Ca    8.4      14 Jan 2021 07:15  Mg     2.3     01-14    TPro  5.8<L>  /  Alb  2.4<L>  /  TBili  0.3  /  DBili  0.1  /  AST  24  /  ALT  34  /  AlkPhos  58  01-14        CAPILLARY BLOOD GLUCOSE      POCT Blood Glucose.: 212 mg/dL (13 Jan 2021 17:05)            ----  COVID specific labs:    Auto Neutrophil #: 7.37 K/uL (01-14-21 @ 07:15)  Auto Neutrophil #: 9.25 K/uL (01-10-21 @ 07:00)  Auto Neutrophil #: 4.20 K/uL (01-07-21 @ 14:30)    Auto Lymphocyte #: 0.56 K/uL (01-14-21 @ 07:15)  Auto Lymphocyte #: 0.37 K/uL (01-10-21 @ 07:00)  Auto Lymphocyte #: 1.21 K/uL (01-07-21 @ 14:30)    NLR 13    Procalcitonin, Serum: 0.15 ng/mL (01-07-21 @ 14:30)    Ferritin, Serum: 467 ng/mL (01-13-21 @ 07:19)      D-Dimer Assay, Quantitative: 262 ng/mL DDU (01-13-21 @ 07:19)  D-Dimer Assay, Quantitative: 172 ng/mL DDU (01-07-21 @ 14:30)        Creatine Kinase, Serum: 34 U/L (01-13-21 @ 07:19)  Creatine Kinase, Serum: 123 U/L (01-07-21 @ 14:30)        Troponin I, Serum: <.017 ng/mL (01-08-21 @ 15:12)  Troponin I, Serum: <.017 ng/mL (01-08-21 @ 07:15)  Troponin I, Serum: <.017 ng/mL (01-07-21 @ 22:45)  Troponin I, Serum: .017 ng/mL (01-07-21 @ 14:30)    Lactate, Blood: 1.1 mmol/L (01-07-21 @ 22:45)  Lactate, Blood: 2.6 mmol/L (01-07-21 @ 14:30)    Prothrombin Time, Plasma: 15.4 sec (01-07-21 @ 14:30)    Activated Partial Thromboplastin Time: 29.5 sec (01-07-21 @ 14:30)      ----  Personally reviewed:  Vital sign trends: [ x ] yes    [  ] no     [  ] n/a  Laboratory results: [ x ] yes    [  ] no     [  ] n/a  Radiology results: [x  ] yes    [  ] no     [  ] n/a - cxr w/ patchy groundglass opacities b/l  [employer] results: [ x ] yes    [  ] no     [  ] n/a - blood/urine cx's reviewed  Consultant recommendations: [  ] yes    [  ] no     [ x ] n/a

## 2021-01-15 LAB
ALBUMIN SERPL ELPH-MCNC: 2.5 G/DL — LOW (ref 3.3–5)
ALBUMIN SERPL ELPH-MCNC: 2.5 G/DL — LOW (ref 3.3–5)
ALP SERPL-CCNC: 70 U/L — SIGNIFICANT CHANGE UP (ref 40–120)
ALP SERPL-CCNC: 76 U/L — SIGNIFICANT CHANGE UP (ref 40–120)
ALT FLD-CCNC: 35 U/L — SIGNIFICANT CHANGE UP (ref 10–45)
ALT FLD-CCNC: 36 U/L — SIGNIFICANT CHANGE UP (ref 10–45)
ANION GAP SERPL CALC-SCNC: 10 MMOL/L — SIGNIFICANT CHANGE UP (ref 5–17)
AST SERPL-CCNC: 27 U/L — SIGNIFICANT CHANGE UP (ref 10–40)
AST SERPL-CCNC: 27 U/L — SIGNIFICANT CHANGE UP (ref 10–40)
BASOPHILS # BLD AUTO: 0.03 K/UL — SIGNIFICANT CHANGE UP (ref 0–0.2)
BASOPHILS NFR BLD AUTO: 0.2 % — SIGNIFICANT CHANGE UP (ref 0–2)
BILIRUB DIRECT SERPL-MCNC: 0.2 MG/DL — SIGNIFICANT CHANGE UP (ref 0–0.2)
BILIRUB INDIRECT FLD-MCNC: 0.2 MG/DL — SIGNIFICANT CHANGE UP (ref 0.2–1)
BILIRUB SERPL-MCNC: 0.4 MG/DL — SIGNIFICANT CHANGE UP (ref 0.2–1.2)
BILIRUB SERPL-MCNC: 0.4 MG/DL — SIGNIFICANT CHANGE UP (ref 0.2–1.2)
BUN SERPL-MCNC: 31 MG/DL — HIGH (ref 7–23)
CALCIUM SERPL-MCNC: 8.7 MG/DL — SIGNIFICANT CHANGE UP (ref 8.4–10.5)
CHLORIDE SERPL-SCNC: 105 MMOL/L — SIGNIFICANT CHANGE UP (ref 96–108)
CO2 SERPL-SCNC: 28 MMOL/L — SIGNIFICANT CHANGE UP (ref 22–31)
CREAT SERPL-MCNC: 0.82 MG/DL — SIGNIFICANT CHANGE UP (ref 0.5–1.3)
CREAT SERPL-MCNC: 0.84 MG/DL — SIGNIFICANT CHANGE UP (ref 0.5–1.3)
EOSINOPHIL # BLD AUTO: 0 K/UL — SIGNIFICANT CHANGE UP (ref 0–0.5)
EOSINOPHIL NFR BLD AUTO: 0 % — SIGNIFICANT CHANGE UP (ref 0–6)
GLUCOSE SERPL-MCNC: 99 MG/DL — SIGNIFICANT CHANGE UP (ref 70–99)
HCT VFR BLD CALC: 42.7 % — SIGNIFICANT CHANGE UP (ref 39–50)
HGB BLD-MCNC: 13.9 G/DL — SIGNIFICANT CHANGE UP (ref 13–17)
IMM GRANULOCYTES NFR BLD AUTO: 3 % — HIGH (ref 0–1.5)
LYMPHOCYTES # BLD AUTO: 0.79 K/UL — LOW (ref 1–3.3)
LYMPHOCYTES # BLD AUTO: 6.3 % — LOW (ref 13–44)
MAGNESIUM SERPL-MCNC: 2.2 MG/DL — SIGNIFICANT CHANGE UP (ref 1.6–2.6)
MCHC RBC-ENTMCNC: 30.6 PG — SIGNIFICANT CHANGE UP (ref 27–34)
MCHC RBC-ENTMCNC: 32.6 GM/DL — SIGNIFICANT CHANGE UP (ref 32–36)
MCV RBC AUTO: 94.1 FL — SIGNIFICANT CHANGE UP (ref 80–100)
MONOCYTES # BLD AUTO: 1.49 K/UL — HIGH (ref 0–0.9)
MONOCYTES NFR BLD AUTO: 11.9 % — SIGNIFICANT CHANGE UP (ref 2–14)
NEUTROPHILS # BLD AUTO: 9.81 K/UL — HIGH (ref 1.8–7.4)
NEUTROPHILS NFR BLD AUTO: 78.6 % — HIGH (ref 43–77)
NRBC # BLD: 0 /100 WBCS — SIGNIFICANT CHANGE UP (ref 0–0)
PHOSPHATE SERPL-MCNC: 2.8 MG/DL — SIGNIFICANT CHANGE UP (ref 2.5–4.5)
PLATELET # BLD AUTO: 365 K/UL — SIGNIFICANT CHANGE UP (ref 150–400)
POTASSIUM SERPL-MCNC: 4 MMOL/L — SIGNIFICANT CHANGE UP (ref 3.5–5.3)
POTASSIUM SERPL-SCNC: 4 MMOL/L — SIGNIFICANT CHANGE UP (ref 3.5–5.3)
PROT SERPL-MCNC: 6.2 G/DL — SIGNIFICANT CHANGE UP (ref 6–8.3)
PROT SERPL-MCNC: 6.2 G/DL — SIGNIFICANT CHANGE UP (ref 6–8.3)
RBC # BLD: 4.54 M/UL — SIGNIFICANT CHANGE UP (ref 4.2–5.8)
RBC # FLD: 12.7 % — SIGNIFICANT CHANGE UP (ref 10.3–14.5)
SODIUM SERPL-SCNC: 143 MMOL/L — SIGNIFICANT CHANGE UP (ref 135–145)
WBC # BLD: 12.5 K/UL — HIGH (ref 3.8–10.5)
WBC # FLD AUTO: 12.5 K/UL — HIGH (ref 3.8–10.5)

## 2021-01-15 PROCEDURE — 99233 SBSQ HOSP IP/OBS HIGH 50: CPT | Mod: CS

## 2021-01-15 RX ADMIN — FAMOTIDINE 20 MILLIGRAM(S): 10 INJECTION INTRAVENOUS at 13:08

## 2021-01-15 RX ADMIN — ZINC SULFATE TAB 220 MG (50 MG ZINC EQUIVALENT) 220 MILLIGRAM(S): 220 (50 ZN) TAB at 13:08

## 2021-01-15 RX ADMIN — REMDESIVIR 500 MILLIGRAM(S): 5 INJECTION INTRAVENOUS at 20:58

## 2021-01-15 RX ADMIN — Medication 6 MILLIGRAM(S): at 05:17

## 2021-01-15 RX ADMIN — Medication 81 MILLIGRAM(S): at 13:08

## 2021-01-15 RX ADMIN — Medication 25 MILLIGRAM(S): at 05:17

## 2021-01-15 RX ADMIN — Medication 1000 UNIT(S): at 13:07

## 2021-01-15 RX ADMIN — ENOXAPARIN SODIUM 40 MILLIGRAM(S): 100 INJECTION SUBCUTANEOUS at 13:08

## 2021-01-15 RX ADMIN — Medication 25 MILLIGRAM(S): at 17:06

## 2021-01-15 RX ADMIN — AMLODIPINE BESYLATE 5 MILLIGRAM(S): 2.5 TABLET ORAL at 05:17

## 2021-01-15 RX ADMIN — Medication 500 MILLIGRAM(S): at 13:07

## 2021-01-15 NOTE — PROGRESS NOTE ADULT - ASSESSMENT
70 male with pmh colon adenocarcinoma, oligo-metastatic to liver (3/2014), and metastatic to lung (2016), s/p lung metastaectomy (2/2017), last chemo with capecitabine (8/2015), htn, hld, presents with sob, progressive BRYAN, fatigue, malaise, poor po intake admitted with Covid pneumonia with hypoxia, KARYNA and electrolyte abnormalities.    # Severe sepsis and acute hypoxic respiratory failure 2/2 Covid pneumonia   # Lactic acidosis; resolved  - Patient completed 5 days of remdesivir. Continue w/ extended course of remdesivir until can wean oxygen further.   - Decadron day 8 of 10  - famotidine, supplemental vitamins   - robitussin DM PRN  - encouraged Incentive spirometry  - encouraged self proning in bed as much as possible.  - blood cultures no growth to date  - inflammatory markers not impressively elevated  - pt already on aspirin 81mg, will have to consider extended course of VTE ppx    # KARYNA  # Hyponatremia  -Resolved  -Monitor  -Now sodium uptrending, suspect likely hypovolemic hyponatremia, will trend, encourage po intake    # Hypokalemia  -replete prn. resolved. monitor q daily    # Steroid induced leukocytosis  -Resolved, Monitor, afebrile, no evidence of uncontrolled secondary infectious process. can check procal as needed    # CAD  # HTN  chronic conditions  continue asa 81 daily  norvasc 5 daily, metoprolol 25 BID  holding valsartan and HCTZ 25 mg qd 2/2 KARYNA (on admission) - BP has essentially been at a reasonable goal of SBP~140    # H/O Colon Adenocarcinoma with mets  in remission with no evidence of recurrent dz  follows heme/onc as outpatient, last visit 1/5/21    # GOC  full code  - Spoke w/ son, Александр [phone] today. discussed the pt's frustration w/ slow progress. He asked that I speak w/ the pt's PCP who knows him well and hopefully can explain the circumstances to the patient in his native language. Will try to call PCP today as well.   primary care doctor: Dr. Figueroa     70 male with pmh colon adenocarcinoma, oligo-metastatic to liver (3/2014), and metastatic to lung (2016), s/p lung metastaectomy (2/2017), last chemo with capecitabine (8/2015), htn, hld, presents with sob, progressive BRYAN, fatigue, malaise, poor po intake admitted with Covid pneumonia with hypoxia, KARYNA and electrolyte abnormalities.    # Severe sepsis and acute hypoxic respiratory failure 2/2 Covid pneumonia   # Lactic acidosis; resolved  - Patient completed 5 days of remdesivir. Continue w/ extended course of remdesivir until can wean oxygen further.   - Decadron day 9 of 10  - famotidine, supplemental vitamins   - robitussin DM PRN  - encouraged Incentive spirometry  - encouraged self proning in bed as much as possible.  - blood cultures no growth - final read  - inflammatory markers flat  - pt already on aspirin 81mg, will have to consider extended course of VTE ppx upon hospital d/c    # KARYNA  # Hyponatremia  -Resolved  -sodium stable    # Hypokalemia  -replete prn. resolved. monitor q daily    # Steroid induced leukocytosis  -Resolved, Monitor, afebrile, no evidence of uncontrolled secondary infectious process. can check procal as needed    # CAD  # HTN  chronic conditions  continue asa 81 daily  norvasc 5 daily, metoprolol 25 BID  holding valsartan and HCTZ 25 mg qd 2/2 KARYNA (on admission) - BP has essentially been at a reasonable goal of SBP~140    # H/O Colon Adenocarcinoma with mets  in remission with no evidence of recurrent dz  follows heme/onc as outpatient, last visit 1/5/21    # [facility]  full code  - Contact is Александр [phone], we spoke again today. I encouraged patience. LMOM for pt's PCP - awaiting return call - primary care doctor: Dr. Figueroa

## 2021-01-15 NOTE — PROGRESS NOTE ADULT - NSHPATTENDINGPLANDISCUSS_GEN_ALL_CORE
pt, SW, CM, RN, family - re: tx plan, disposition planning, above detailed plan pt, SW, CM, RN, family, LMOM w/ PCP - re: tx plan, disposition planning, above detailed plan

## 2021-01-15 NOTE — PROGRESS NOTE ADULT - ATTENDING COMMENTS
The admission plan was discussed in detail with the patient and family members. Their questions and concerns were addressed to the best of my ability. They are in agreement with the plan as detailed above. They demonstrated adequate understanding of the counseling which I have provided. The tx plan was discussed in detail with the patient and family members. Their questions and concerns were addressed to the best of my ability. They are in agreement with the plan as detailed above. They demonstrated adequate understanding of the counseling which I have provided.

## 2021-01-15 NOTE — PROGRESS NOTE ADULT - SUBJECTIVE AND OBJECTIVE BOX
Name: MORALES MORILLO  MRN: [id number]  LOCATION: [place] [address]    ----  Patient is a 70y old  Male who presents with a chief complaint of Acute respiratory failure due to COVID (14 Jan 2021 11:34)      FROM ADMISSION H+P:   HPI:  69 y/o M PMH colon adenocarcinoma, oligo-metastatic to liver (3/2014), and metastatic to lung (2016), s/p lung metastaectomy (2/2017), last chemo with Capecitabine (8/2015), HTN, HLD,  c/o SOB, progressive BRYAN, fatigue, malaise, poor PO x 2-3 days. Live at home with wife, unsure of covid positive contacts. ED course significant for hypoxia O2Sat 85% on RA at rest improved to 92-93% on 4L NC. Denies cp, palpitations, nausea, vomiting, abd pain, numbness/tingling in the extremities.  Fam hx - unknown (07 Jan 2021 17:31)      ----  INTERVAL HPI/OVERNIGHT EVENTS:   Pt speaks [place] only. [employer]  services were utilized for my interaction with the patient.  # [id number] - Dennys  Pt seen and evaluated at the bedside. No acute overnight events occurred. The patient dismisses all concerns but he appears less frustrated today. When he ambulated today, he used 6L NC and his pulse ox dropped to 70's. He sat on toilet to use restroom and SpO2 improved to 85-88%. It dropped w/ ambulation back to bed and while in bed he recovered to 95%+ while resting but it took a few minutes to recover. Pt was noticeably dyspneic and was uncomfortable. He appears to recognize his severity of symptoms today. He denies CP, palpitations. He denies nausea or vomiting. Denies leg edema or joint pains. He is dyspneic w/ coughing while I was in the room but cough is dry. No other new complaints. Afebrile.       ----  PAST MEDICAL & SURGICAL HISTORY:  Lung metastasis    Cataract of left eye    Atherosclerosis    DJD (degenerative joint disease)    Lumbar stenosis    Snoring  DREW precautions -- responds affirmatively to STOP BANG questionnaire -- admits to loud snoring; age &gt; 50; h/o htn; gender, male; h/o htn    Clostridium difficile infection  on short term vancomyacin    Reflux    Depression  sts was depressed &quot;I lost my brother while going to this&quot;    Liver cancer  METS from the colon -- has received chemotherapy    Colon cancer  had surgery in March 2014 with mets to liver-- received chemotherapy    Arthropathy  left hip &gt; right hip    Carotid artery occlusion  left side in November 2013  Left CEA 11/2013    Osteoarthritis    Hyperlipidemia    CVA (cerebral vascular accident)  2005 -- wife and patient state no residual    Hypertension    H/O resection of liver  2015    Colon cancer  [facility] 2014 for chemotherapy due to colon cancer with METS to liver    Colon cancer  diagnosed in March 2014 with subsequent colon cancer followed with chemotherapy    H/O skin graft  1984    S/P carotid endarterectomy  11/2013  left side        FAMILY HISTORY:  Family history of stomach cancer (Sibling)    Family history of colon cancer (Sibling)        Allergies    No Known Allergies    Intolerances        ----  ANTIMICROBIALS:  remdesivir  IVPB 100 milliGRAM(s) IV Intermittent every 24 hours    CARDIOVASCULAR:  amLODIPine   Tablet 5 milliGRAM(s) Oral daily  metoprolol tartrate 25 milliGRAM(s) Oral two times a day    GASTROINTESTINAL:  famotidine    Tablet 20 milliGRAM(s) Oral daily    PULMONARY:  guaifenesin/dextromethorphan  Syrup 100 milliLiter(s) Oral every 6 hours PRN      ----  REVIEW OF SYSTEMS:  comprehensive ROS as per HPI     ----  PHYSICAL EXAM:  GENERAL: patient appears comfortable, nontoxic, no distress, speaking in full sentences without distress while resting  ENMT: oropharynx clear without erythema, moist mucous membranes  LUNGS: coarse air entry bilaterally but generally clear to auscultation, soft bibasilar rhonchi noted  HEART: soft S1/S2, regular rate and rhythm, no murmurs noted, no noted edema to b/l LE  GASTROINTESTINAL: abdomen is soft, nontender, nondistended, normoactive bowel sounds, no palpable masses  MUSCULOSKELETAL: no clubbing or cyanosis, no obvious deformity  VASC: nail beds remain pale, borderline cyanotic and cool to touch  NEUROLOGIC: awake, alert, readily interactive, good muscle tone in 4 extremities    T(C): 36.4 (01-15-21 @ 04:30), Max: 36.7 (01-14-21 @ 20:35)  HR: 69 (01-15-21 @ 04:30) (69 - 69)  BP: 153/80 (01-15-21 @ 04:30) (125/60 - 153/80)  RR: 18 (01-15-21 @ 04:30) (17 - 18)  SpO2: 97% (01-15-21 @ 04:30) (91% - 97%)  Wt(kg): --    ----  INTAKE & OUTPUT:  I&O's Summary    14 Jan 2021 07:01  -  15 Ovi 2021 07:00  --------------------------------------------------------  IN: 800 mL / OUT: 0 mL / NET: 800 mL        LABS:                        13.9   12.50 )-----------( 365      ( 15 Ovi 2021 05:30 )             42.7     01-15    143  |  105  |  31<H>  ----------------------------<  99  4.0   |  28  |  0.82    Ca    8.7      15 Ovi 2021 05:30  Phos  2.8     01-15  Mg     2.2     01-15    TPro  6.2  /  Alb  2.5<L>  /  TBili  0.4  /  DBili  0.2  /  AST  27  /  ALT  35  /  AlkPhos  76  01-15        CAPILLARY BLOOD GLUCOSE                ----  COVID specific labs:    Auto Neutrophil #: 9.81 K/uL (01-15-21 @ 05:30)  Auto Neutrophil #: 7.37 K/uL (01-14-21 @ 07:15)  Auto Neutrophil #: 9.25 K/uL (01-10-21 @ 07:00)  Auto Neutrophil #: 4.20 K/uL (01-07-21 @ 14:30)    Auto Lymphocyte #: 0.79 K/uL (01-15-21 @ 05:30)  Auto Lymphocyte #: 0.56 K/uL (01-14-21 @ 07:15)  Auto Lymphocyte #: 0.37 K/uL (01-10-21 @ 07:00)  Auto Lymphocyte #: 1.21 K/uL (01-07-21 @ 14:30)    Procalcitonin, Serum: 0.15 ng/mL (01-07-21 @ 14:30)    Ferritin, Serum: 467 ng/mL (01-13-21 @ 07:19)      D-Dimer Assay, Quantitative: 262 ng/mL DDU (01-13-21 @ 07:19)  D-Dimer Assay, Quantitative: 172 ng/mL DDU (01-07-21 @ 14:30)        Creatine Kinase, Serum: 34 U/L (01-13-21 @ 07:19)  Creatine Kinase, Serum: 123 U/L (01-07-21 @ 14:30)        Troponin I, Serum: <.017 ng/mL (01-08-21 @ 15:12)  Troponin I, Serum: <.017 ng/mL (01-08-21 @ 07:15)  Troponin I, Serum: <.017 ng/mL (01-07-21 @ 22:45)  Troponin I, Serum: .017 ng/mL (01-07-21 @ 14:30)    Lactate, Blood: 1.1 mmol/L (01-07-21 @ 22:45)  Lactate, Blood: 2.6 mmol/L (01-07-21 @ 14:30)    Prothrombin Time, Plasma: 15.4 sec (01-07-21 @ 14:30)    Activated Partial Thromboplastin Time: 29.5 sec (01-07-21 @ 14:30)      ----  Personally reviewed:  Vital sign trends: [  ] yes    [  ] no     [  ] n/a  Laboratory results: [  ] yes    [  ] no     [  ] n/a  Radiology results: [  ] yes    [  ] no     [  ] n/a  Microbio results: [  ] yes    [  ] no     [  ] n/a  Consultant recommendations: [  ] yes    [  ] no     [  ] n/a         Name: MORALES MORILLO  MRN: [id number]  LOCATION: [place] [address]    ----  Patient is a 70y old  Male who presents with a chief complaint of Acute respiratory failure due to COVID (14 Jan 2021 11:34)      FROM ADMISSION H+P:   HPI:  69 y/o M PMH colon adenocarcinoma, oligo-metastatic to liver (3/2014), and metastatic to lung (2016), s/p lung metastaectomy (2/2017), last chemo with Capecitabine (8/2015), HTN, HLD,  c/o SOB, progressive BRYAN, fatigue, malaise, poor PO x 2-3 days. Live at home with wife, unsure of covid positive contacts. ED course significant for hypoxia O2Sat 85% on RA at rest improved to 92-93% on 4L NC. Denies cp, palpitations, nausea, vomiting, abd pain, numbness/tingling in the extremities.  Fam hx - unknown (07 Jan 2021 17:31)      ----  INTERVAL HPI/OVERNIGHT EVENTS:   Pt speaks Martiniquais only. [employer]  services were utilized for my interaction with the patient.  # [id number] - Dennys  Pt seen and evaluated at the bedside. No acute overnight events occurred. The patient dismisses all concerns but he appears less frustrated today. When he ambulated today, he used 6L NC and his pulse ox dropped to 70's. He sat on toilet to use restroom and SpO2 improved to 85-88%. It dropped w/ ambulation back to bed and while in bed he recovered to 95%+ while resting but it took a few minutes to recover. Pt was noticeably dyspneic and was uncomfortable. He appears to recognize his severity of symptoms today. He denies CP, palpitations. He denies nausea or vomiting. Denies leg edema or joint pains. He is dyspneic w/ coughing while I was in the room but cough is dry. No other new complaints. Afebrile.       ----  PAST MEDICAL & SURGICAL HISTORY:  Lung metastasis    Cataract of left eye    Atherosclerosis    DJD (degenerative joint disease)    Lumbar stenosis    Snoring  DREW precautions -- responds affirmatively to STOP BANG questionnaire -- admits to loud snoring; age &gt; 50; h/o htn; gender, male; h/o htn    Clostridium difficile infection  on short term vancomyacin    Reflux    Depression  sts was depressed &quot;I lost my brother while going to this&quot;    Liver cancer  METS from the colon -- has received chemotherapy    Colon cancer  had surgery in March 2014 with mets to liver-- received chemotherapy    Arthropathy  left hip &gt; right hip    Carotid artery occlusion  left side in November 2013  Left CEA 11/2013    Osteoarthritis    Hyperlipidemia    CVA (cerebral vascular accident)  2005 -- wife and patient state no residual    Hypertension    H/O resection of liver  2015    Colon cancer  [facility] 2014 for chemotherapy due to colon cancer with METS to liver    Colon cancer  diagnosed in March 2014 with subsequent colon cancer followed with chemotherapy    H/O skin graft  1984    S/P carotid endarterectomy  11/2013  left side        FAMILY HISTORY:  Family history of stomach cancer (Sibling)    Family history of colon cancer (Sibling)        Allergies    No Known Allergies    Intolerances        ----  ANTIMICROBIALS:  remdesivir  IVPB 100 milliGRAM(s) IV Intermittent every 24 hours    CARDIOVASCULAR:  amLODIPine   Tablet 5 milliGRAM(s) Oral daily  metoprolol tartrate 25 milliGRAM(s) Oral two times a day    GASTROINTESTINAL:  famotidine    Tablet 20 milliGRAM(s) Oral daily    PULMONARY:  guaifenesin/dextromethorphan  Syrup 100 milliLiter(s) Oral every 6 hours PRN      ----  REVIEW OF SYSTEMS:  comprehensive ROS as per HPI     ----  PHYSICAL EXAM:  GENERAL: patient appears comfortable, nontoxic, no distress, speaking in full sentences without distress while resting  ENMT: oropharynx clear without erythema, moist mucous membranes  LUNGS: coarse air entry bilaterally but generally clear to auscultation, soft bibasilar rhonchi noted  HEART: soft S1/S2, regular rate and rhythm, no murmurs noted, no noted edema to b/l LE  GASTROINTESTINAL: abdomen is soft, nontender, nondistended, normoactive bowel sounds, no palpable masses  MUSCULOSKELETAL: no clubbing or cyanosis, no obvious deformity  VASC: nail beds remain pale, borderline cyanotic and cool to touch  NEUROLOGIC: awake, alert, readily interactive, good muscle tone in 4 extremities    T(C): 36.4 (01-15-21 @ 04:30), Max: 36.7 (01-14-21 @ 20:35)  HR: 69 (01-15-21 @ 04:30) (69 - 69)  BP: 153/80 (01-15-21 @ 04:30) (125/60 - 153/80)  RR: 18 (01-15-21 @ 04:30) (17 - 18)  SpO2: 97% (01-15-21 @ 04:30) (91% - 97%)  Wt(kg): --    ----  INTAKE & OUTPUT:  I&O's Summary    14 Jan 2021 07:01  -  15 Ovi 2021 07:00  --------------------------------------------------------  IN: 800 mL / OUT: 0 mL / NET: 800 mL        LABS:                        13.9   12.50 )-----------( 365      ( 15 Ovi 2021 05:30 )             42.7     01-15    143  |  105  |  31<H>  ----------------------------<  99  4.0   |  28  |  0.82    Ca    8.7      15 Ovi 2021 05:30  Phos  2.8     01-15  Mg     2.2     01-15    TPro  6.2  /  Alb  2.5<L>  /  TBili  0.4  /  DBili  0.2  /  AST  27  /  ALT  35  /  AlkPhos  76  01-15        CAPILLARY BLOOD GLUCOSE                ----  COVID specific labs:    Auto Neutrophil #: 9.81 K/uL (01-15-21 @ 05:30)  Auto Neutrophil #: 7.37 K/uL (01-14-21 @ 07:15)  Auto Neutrophil #: 9.25 K/uL (01-10-21 @ 07:00)  Auto Neutrophil #: 4.20 K/uL (01-07-21 @ 14:30)    Auto Lymphocyte #: 0.79 K/uL (01-15-21 @ 05:30)  Auto Lymphocyte #: 0.56 K/uL (01-14-21 @ 07:15)  Auto Lymphocyte #: 0.37 K/uL (01-10-21 @ 07:00)  Auto Lymphocyte #: 1.21 K/uL (01-07-21 @ 14:30)    Procalcitonin, Serum: 0.15 ng/mL (01-07-21 @ 14:30)    Ferritin, Serum: 467 ng/mL (01-13-21 @ 07:19)      D-Dimer Assay, Quantitative: 262 ng/mL DDU (01-13-21 @ 07:19)  D-Dimer Assay, Quantitative: 172 ng/mL DDU (01-07-21 @ 14:30)        Creatine Kinase, Serum: 34 U/L (01-13-21 @ 07:19)  Creatine Kinase, Serum: 123 U/L (01-07-21 @ 14:30)        Troponin I, Serum: <.017 ng/mL (01-08-21 @ 15:12)  Troponin I, Serum: <.017 ng/mL (01-08-21 @ 07:15)  Troponin I, Serum: <.017 ng/mL (01-07-21 @ 22:45)  Troponin I, Serum: .017 ng/mL (01-07-21 @ 14:30)    Lactate, Blood: 1.1 mmol/L (01-07-21 @ 22:45)  Lactate, Blood: 2.6 mmol/L (01-07-21 @ 14:30)    Prothrombin Time, Plasma: 15.4 sec (01-07-21 @ 14:30)    Activated Partial Thromboplastin Time: 29.5 sec (01-07-21 @ 14:30)      ----  Personally reviewed:  Vital sign trends: [ x ] yes    [  ] no     [  ] n/a  Laboratory results: [ x ] yes    [  ] no     [  ] n/a  Radiology results: [ x ] yes    [  ] no     [  ] n/a  Microbio results: [  ] yes    [  ] no     [ x ] n/a  Consultant recommendations: [  ] yes    [  ] no     [ x ] n/a

## 2021-01-16 LAB
ALBUMIN SERPL ELPH-MCNC: 2.5 G/DL — LOW (ref 3.3–5)
ALBUMIN SERPL ELPH-MCNC: 2.5 G/DL — LOW (ref 3.3–5)
ALP SERPL-CCNC: 74 U/L — SIGNIFICANT CHANGE UP (ref 40–120)
ALP SERPL-CCNC: 75 U/L — SIGNIFICANT CHANGE UP (ref 40–120)
ALT FLD-CCNC: 37 U/L — SIGNIFICANT CHANGE UP (ref 10–45)
ALT FLD-CCNC: 37 U/L — SIGNIFICANT CHANGE UP (ref 10–45)
ANION GAP SERPL CALC-SCNC: 8 MMOL/L — SIGNIFICANT CHANGE UP (ref 5–17)
AST SERPL-CCNC: 32 U/L — SIGNIFICANT CHANGE UP (ref 10–40)
AST SERPL-CCNC: 37 U/L — SIGNIFICANT CHANGE UP (ref 10–40)
BASOPHILS # BLD AUTO: 0.06 K/UL — SIGNIFICANT CHANGE UP (ref 0–0.2)
BASOPHILS NFR BLD AUTO: 0.5 % — SIGNIFICANT CHANGE UP (ref 0–2)
BILIRUB DIRECT SERPL-MCNC: 0.1 MG/DL — SIGNIFICANT CHANGE UP (ref 0–0.2)
BILIRUB INDIRECT FLD-MCNC: 0.3 MG/DL — SIGNIFICANT CHANGE UP (ref 0.2–1)
BILIRUB SERPL-MCNC: 0.4 MG/DL — SIGNIFICANT CHANGE UP (ref 0.2–1.2)
BILIRUB SERPL-MCNC: 0.4 MG/DL — SIGNIFICANT CHANGE UP (ref 0.2–1.2)
BUN SERPL-MCNC: 28 MG/DL — HIGH (ref 7–23)
CALCIUM SERPL-MCNC: 8.6 MG/DL — SIGNIFICANT CHANGE UP (ref 8.4–10.5)
CHLORIDE SERPL-SCNC: 106 MMOL/L — SIGNIFICANT CHANGE UP (ref 96–108)
CO2 SERPL-SCNC: 28 MMOL/L — SIGNIFICANT CHANGE UP (ref 22–31)
CREAT SERPL-MCNC: 0.88 MG/DL — SIGNIFICANT CHANGE UP (ref 0.5–1.3)
EOSINOPHIL # BLD AUTO: 0 K/UL — SIGNIFICANT CHANGE UP (ref 0–0.5)
EOSINOPHIL NFR BLD AUTO: 0 % — SIGNIFICANT CHANGE UP (ref 0–6)
GLUCOSE SERPL-MCNC: 87 MG/DL — SIGNIFICANT CHANGE UP (ref 70–99)
HCT VFR BLD CALC: 44.2 % — SIGNIFICANT CHANGE UP (ref 39–50)
HGB BLD-MCNC: 14.5 G/DL — SIGNIFICANT CHANGE UP (ref 13–17)
IMM GRANULOCYTES NFR BLD AUTO: 5.1 % — HIGH (ref 0–1.5)
LYMPHOCYTES # BLD AUTO: 0.66 K/UL — LOW (ref 1–3.3)
LYMPHOCYTES # BLD AUTO: 5.6 % — LOW (ref 13–44)
MAGNESIUM SERPL-MCNC: 2.1 MG/DL — SIGNIFICANT CHANGE UP (ref 1.6–2.6)
MCHC RBC-ENTMCNC: 31 PG — SIGNIFICANT CHANGE UP (ref 27–34)
MCHC RBC-ENTMCNC: 32.8 GM/DL — SIGNIFICANT CHANGE UP (ref 32–36)
MCV RBC AUTO: 94.6 FL — SIGNIFICANT CHANGE UP (ref 80–100)
MONOCYTES # BLD AUTO: 1.34 K/UL — HIGH (ref 0–0.9)
MONOCYTES NFR BLD AUTO: 11.4 % — SIGNIFICANT CHANGE UP (ref 2–14)
NEUTROPHILS # BLD AUTO: 9.05 K/UL — HIGH (ref 1.8–7.4)
NEUTROPHILS NFR BLD AUTO: 77.4 % — HIGH (ref 43–77)
NRBC # BLD: 0 /100 WBCS — SIGNIFICANT CHANGE UP (ref 0–0)
PLATELET # BLD AUTO: 366 K/UL — SIGNIFICANT CHANGE UP (ref 150–400)
POTASSIUM SERPL-MCNC: 4.1 MMOL/L — SIGNIFICANT CHANGE UP (ref 3.5–5.3)
POTASSIUM SERPL-SCNC: 4.1 MMOL/L — SIGNIFICANT CHANGE UP (ref 3.5–5.3)
PROT SERPL-MCNC: 6.1 G/DL — SIGNIFICANT CHANGE UP (ref 6–8.3)
PROT SERPL-MCNC: 6.1 G/DL — SIGNIFICANT CHANGE UP (ref 6–8.3)
RBC # BLD: 4.67 M/UL — SIGNIFICANT CHANGE UP (ref 4.2–5.8)
RBC # FLD: 12.6 % — SIGNIFICANT CHANGE UP (ref 10.3–14.5)
SODIUM SERPL-SCNC: 142 MMOL/L — SIGNIFICANT CHANGE UP (ref 135–145)
WBC # BLD: 11.71 K/UL — HIGH (ref 3.8–10.5)
WBC # FLD AUTO: 11.71 K/UL — HIGH (ref 3.8–10.5)

## 2021-01-16 PROCEDURE — 71275 CT ANGIOGRAPHY CHEST: CPT | Mod: 26

## 2021-01-16 PROCEDURE — 99233 SBSQ HOSP IP/OBS HIGH 50: CPT | Mod: CS

## 2021-01-16 RX ORDER — HYDROCHLOROTHIAZIDE 25 MG
12.5 TABLET ORAL ONCE
Refills: 0 | Status: COMPLETED | OUTPATIENT
Start: 2021-01-16 | End: 2021-01-16

## 2021-01-16 RX ORDER — LOSARTAN POTASSIUM 100 MG/1
100 TABLET, FILM COATED ORAL DAILY
Refills: 0 | Status: DISCONTINUED | OUTPATIENT
Start: 2021-01-16 | End: 2021-01-19

## 2021-01-16 RX ORDER — HYDROCHLOROTHIAZIDE 25 MG
12.5 TABLET ORAL DAILY
Refills: 0 | Status: DISCONTINUED | OUTPATIENT
Start: 2021-01-17 | End: 2021-01-19

## 2021-01-16 RX ORDER — BUDESONIDE AND FORMOTEROL FUMARATE DIHYDRATE 160; 4.5 UG/1; UG/1
2 AEROSOL RESPIRATORY (INHALATION)
Refills: 0 | Status: DISCONTINUED | OUTPATIENT
Start: 2021-01-16 | End: 2021-01-19

## 2021-01-16 RX ADMIN — Medication 12.5 MILLIGRAM(S): at 20:48

## 2021-01-16 RX ADMIN — Medication 25 MILLIGRAM(S): at 05:22

## 2021-01-16 RX ADMIN — ZINC SULFATE TAB 220 MG (50 MG ZINC EQUIVALENT) 220 MILLIGRAM(S): 220 (50 ZN) TAB at 11:22

## 2021-01-16 RX ADMIN — Medication 1000 UNIT(S): at 11:23

## 2021-01-16 RX ADMIN — REMDESIVIR 500 MILLIGRAM(S): 5 INJECTION INTRAVENOUS at 20:48

## 2021-01-16 RX ADMIN — Medication 25 MILLIGRAM(S): at 16:29

## 2021-01-16 RX ADMIN — AMLODIPINE BESYLATE 5 MILLIGRAM(S): 2.5 TABLET ORAL at 05:22

## 2021-01-16 RX ADMIN — Medication 81 MILLIGRAM(S): at 11:22

## 2021-01-16 RX ADMIN — Medication 500 MILLIGRAM(S): at 11:22

## 2021-01-16 RX ADMIN — ENOXAPARIN SODIUM 40 MILLIGRAM(S): 100 INJECTION SUBCUTANEOUS at 11:22

## 2021-01-16 RX ADMIN — Medication 6 MILLIGRAM(S): at 05:21

## 2021-01-16 RX ADMIN — BUDESONIDE AND FORMOTEROL FUMARATE DIHYDRATE 2 PUFF(S): 160; 4.5 AEROSOL RESPIRATORY (INHALATION) at 20:57

## 2021-01-16 RX ADMIN — FAMOTIDINE 20 MILLIGRAM(S): 10 INJECTION INTRAVENOUS at 11:22

## 2021-01-16 NOTE — CONSULT NOTE ADULT - ASSESSMENT
Assessment  1. COVID PNA  2. Hypoxia ? post covid ILD ? COPD ? PE  3. h/o lung nodule  5. Ex smoker, underlying undiagnosed copd  6. h/o Colon cancer with mets to liver and lung    Plan  n/c o2 to maintain sat  incentive spirometry  check CTA chest to r/o PE, evaluate copd, ILD from covid  will start symbicort  d/w Dr. Willis

## 2021-01-16 NOTE — PROGRESS NOTE ADULT - SUBJECTIVE AND OBJECTIVE BOX
Patient is a 70y old  Male who presents with a chief complaint of Acute respiratory failure due to COVID (15 Ovi 2021 14:07)      Patient seen and examined at bedside. Pt states they feel well, denies overnight events or current complaints including chest pain, shortness of breath, dizziness, nausea, vomiting, diarrhea, fever or chills.    spO2 96% 4L     ALLERGIES:  No Known Allergies    MEDICATIONS  (STANDING):  amLODIPine   Tablet 5 milliGRAM(s) Oral daily  ascorbic acid 500 milliGRAM(s) Oral daily  aspirin enteric coated 81 milliGRAM(s) Oral daily  cholecalciferol 1000 Unit(s) Oral daily  enoxaparin Injectable 40 milliGRAM(s) SubCutaneous daily  famotidine    Tablet 20 milliGRAM(s) Oral daily  influenza   Vaccine 0.5 milliLiter(s) IntraMuscular once  metoprolol tartrate 25 milliGRAM(s) Oral two times a day  remdesivir  IVPB 100 milliGRAM(s) IV Intermittent every 24 hours  zinc sulfate 220 milliGRAM(s) Oral daily    MEDICATIONS  (PRN):  acetaminophen   Tablet .. 650 milliGRAM(s) Oral every 6 hours PRN Temp greater or equal to 38C (100.4F), Moderate Pain (4 - 6)  guaifenesin/dextromethorphan  Syrup 100 milliLiter(s) Oral every 6 hours PRN Cough    Vital Signs Last 24 Hrs  T(F): 97.3 (16 Jan 2021 06:19), Max: 97.4 (15 Ovi 2021 16:41)  HR: 64 (16 Jan 2021 06:19) (63 - 70)  BP: 163/62 (16 Jan 2021 06:19) (150/64 - 163/62)  RR: 19 (15 Ovi 2021 20:57) (18 - 19)  SpO2: 96% (15 Ovi 2021 20:57) (95% - 96%)  I&O's Summary    15 Ovi 2021 07:01  -  16 Jan 2021 07:00  --------------------------------------------------------  IN: 650 mL / OUT: 0 mL / NET: 650 mL      PHYSICAL EXAM:  General: NAD, A/O x 3  ENT: MMM, no oral thrush   Neck: Supple, No JVD  Lungs: Clear to auscultation bilaterally, non labored breathing  Cardio: RRR, S1/S2, No murmurs  Abdomen: Soft, Nontender, Nondistended; Bowel sounds present  Extremities: No calf tenderness, No pitting edema    LABS:                        14.5   11.71 )-----------( 366      ( 16 Jan 2021 07:10 )             44.2     01-16    142  |  106  |  28  ----------------------------<  87  4.1   |  28  |  0.88    Ca    8.6      16 Jan 2021 07:10  Phos  2.8     01-15  Mg     2.1     01-16    TPro  6.1  /  Alb  2.5  /  TBili  0.4  /  DBili  0.1  /  AST  32  /  ALT  37  /  AlkPhos  74  01-16    eGFR if Non African American: 86 mL/min/1.73M2 (01-16-21 @ 07:10)  eGFR if African American: 100 mL/min/1.73M2 (01-16-21 @ 07:10)                RADIOLOGY & ADDITIONAL TESTS:    Care Discussed with Consultants/Other Providers:    Patient is a 70y old  Male who presents with a chief complaint of Acute respiratory failure due to COVID (15 Ovi 2021 14:07)      Patient seen and examined at bedside. Pt states they feel well, denies overnight events or current complaints including chest pain, shortness of breath, dizziness, nausea, vomiting, diarrhea, fever or chills.    spO2 96% 4L     ALLERGIES:  No Known Allergies    MEDICATIONS  (STANDING):  amLODIPine   Tablet 5 milliGRAM(s) Oral daily  ascorbic acid 500 milliGRAM(s) Oral daily  aspirin enteric coated 81 milliGRAM(s) Oral daily  cholecalciferol 1000 Unit(s) Oral daily  enoxaparin Injectable 40 milliGRAM(s) SubCutaneous daily  famotidine    Tablet 20 milliGRAM(s) Oral daily  influenza   Vaccine 0.5 milliLiter(s) IntraMuscular once  metoprolol tartrate 25 milliGRAM(s) Oral two times a day  remdesivir  IVPB 100 milliGRAM(s) IV Intermittent every 24 hours  zinc sulfate 220 milliGRAM(s) Oral daily    MEDICATIONS  (PRN):  acetaminophen   Tablet .. 650 milliGRAM(s) Oral every 6 hours PRN Temp greater or equal to 38C (100.4F), Moderate Pain (4 - 6)  guaifenesin/dextromethorphan  Syrup 100 milliLiter(s) Oral every 6 hours PRN Cough    Vital Signs Last 24 Hrs  T(F): 97.3 (16 Jan 2021 06:19), Max: 97.4 (15 Ovi 2021 16:41)  HR: 64 (16 Jan 2021 06:19) (63 - 70)  BP: 163/62 (16 Jan 2021 06:19) (150/64 - 163/62)  RR: 19 (15 Ovi 2021 20:57) (18 - 19)  SpO2: 96% (15 Ovi 2021 20:57) (95% - 96%)  I&O's Summary    15 Ovi 2021 07:01  -  16 Jan 2021 07:00  --------------------------------------------------------  IN: 650 mL / OUT: 0 mL / NET: 650 mL      PHYSICAL EXAM:  General: NAD, A/O x 3  ENT: MMM, no oral thrush   Neck: Supple, No JVD  Lungs: Clear to auscultation bilaterally, non labored breathing  Cardio: RRR, S1/S2, No murmurs  Abdomen: Soft, Nontender, Nondistended; Bowel sounds present  Extremities: No calf tenderness, No pitting edema    LABS:                        14.5   11.71 )-----------( 366      ( 16 Jan 2021 07:10 )             44.2     01-16    142  |  106  |  28  ----------------------------<  87  4.1   |  28  |  0.88    Ca    8.6      16 Jan 2021 07:10  Phos  2.8     01-15  Mg     2.1     01-16    TPro  6.1  /  Alb  2.5  /  TBili  0.4  /  DBili  0.1  /  AST  32  /  ALT  37  /  AlkPhos  74  01-16    eGFR if Non African American: 86 mL/min/1.73M2 (01-16-21 @ 07:10)  eGFR if African American: 100 mL/min/1.73M2 (01-16-21 @ 07:10)      RADIOLOGY & ADDITIONAL TESTS:    Care Discussed with Consultants/Other Providers:

## 2021-01-16 NOTE — CONSULT NOTE ADULT - SUBJECTIVE AND OBJECTIVE BOX
PULMONARY CONSULT  Location of Patient : [facility] [address] ([facility])  Attending requesting Consult:Gordy Willis  Chief Complaint :  SOB  Reason For consult : hypoxia      Initial HPI on admission:  HPI:  70 year old male with h/o Colon cancer with adenocarcinoma with mets to liver and lung s/p metastectomy in 2/2017 s/p chemo, HTN, high chol, who /p SOB x few days on 1/7/21.   in ED found to be hypoxic and agustín olvera showed: + COVID started on decadron and remdisivir, unable to be weaned from o2 and pulmonary consult called.      y/o M PMH colon adenocarcinoma, oligo-metastatic to liver (3/2014), and metastatic to lung (2016), s/p lung metastaectomy (2/2017), last chemo with Capecitabine (8/2015), HTN, HLD,  c/o SOB, progressive BRYAN, fatigue, malaise, poor PO x 2-3 days. Live at home with wife, unsure of covid positive contacts. ED course significant for hypoxia O2Sat 85% on RA at rest improved to 92-93% on 4L NC. Denies cp, palpitations, nausea, vomiting, abd pain, numbness/tingling in the extremities.  Fam hx - unknown (07 Jan 2021 17:31)      BRIEF HOSPITAL COURSE: ***    PAST MEDICAL & SURGICAL HISTORY:  Lung metastasis  Cataract of left eye  Atherosclerosis  DJD (degenerative joint disease)  Lumbar stenosis  Snoring  DREW precautions -- responds affirmatively to STOP BANG questionnaire -- admits to loud snoring; age &gt; 50; h/o htn; gender, male; h/o htn  Clostridium difficile infection on short term vancomyacin  Reflux  Depressionsts was depressed  Liver cancer  METS from the colon -- has received chemotherapy  Colon cancer  had surgery in March 2014 with mets to liver-- received chemotherapy  Arthropathy  left hip &gt; right hip    Carotid artery occlusion  left side in November 2013  Left CEA 11/2013    Osteoarthritis    Hyperlipidemia    CVA (cerebral vascular accident)  2005 -- wife and patient state no residual    Hypertension    H/O resection of liver  2015    Colon cancer  [facility] 2014 for chemotherapy due to colon cancer with METS to liver    Colon cancer  diagnosed in March 2014 with subsequent colon cancer followed with chemotherapy    H/O skin graft  1984    S/P carotid endarterectomy  11/2013  left side      Allergies    No Known Allergies    Intolerances      FAMILY HISTORY:  Family history of stomach cancer (Sibling)    Family history of colon cancer (Sibling)      Social history:      Smoking:     Drinking:     Drug use:    Review of Systems: as stated above    CONSTITUTIONAL: No fever, No chills, No fatigue  EYES: No eye pain, No visual disturbances, No discharge  ENMT:  No difficulty hearing, No tinnitus, No vertigo; No sinus or throat pain  NECK: No pain, No stiffness  RESPIRATORY: No Cough, No SOB, No Secretions  CARDIOVASCULAR: No chest pain, No palpitations, No dizziness, or No leg swelling  GASTROINTESTINAL: No abdominal or epigastric pain. No nausea, No vomiting, No hematemesis; No diarrhea, No constipation. No melena, No hematochezia.  GENITOURINARY: No dysuria, No frequency, No hematuria, No incontinence  NEUROLOGICAL: No headaches, No memory loss, No loss of strength, No numbness, No tremors  SKIN: No itching, No burning, No rashes, No lesions   MUSCULOSKELETAL: No joint pain or swelling; No muscle, back, No extremity pain  PSYCHIATRIC: No depression, No anxiety, No mood swings, No difficulty sleeping      Medications:  MEDICATIONS  (STANDING):  amLODIPine   Tablet 5 milliGRAM(s) Oral daily  ascorbic acid 500 milliGRAM(s) Oral daily  aspirin enteric coated 81 milliGRAM(s) Oral daily  cholecalciferol 1000 Unit(s) Oral daily  enoxaparin Injectable 40 milliGRAM(s) SubCutaneous daily  famotidine    Tablet 20 milliGRAM(s) Oral daily  hydrochlorothiazide 12.5 milliGRAM(s) Oral daily  hydrochlorothiazide 12.5 milliGRAM(s) Oral once  influenza   Vaccine 0.5 milliLiter(s) IntraMuscular once  losartan 100 milliGRAM(s) Oral daily  metoprolol tartrate 25 milliGRAM(s) Oral two times a day  remdesivir  IVPB 100 milliGRAM(s) IV Intermittent every 24 hours  zinc sulfate 220 milliGRAM(s) Oral daily    MEDICATIONS  (PRN):  acetaminophen   Tablet .. 650 milliGRAM(s) Oral every 6 hours PRN Temp greater or equal to 38C (100.4F), Moderate Pain (4 - 6)  guaifenesin/dextromethorphan  Syrup 100 milliLiter(s) Oral every 6 hours PRN Cough      Home Medications:  Last Order Reconciliation Date: 01-07-21 @ 17:40 (Admission Reconciliation)  amLODIPine 5 mg oral tablet: orally 2 times a day (01-07-21 @ 15:27)  aspirin 81 mg oral tablet: 1 tab(s) orally once a day - last dose 12/29/2016 (01-07-21 @ 15:27)  losartan-hydrochlorothiazide 100mg-12.5mg oral tablet: 1 tab(s) orally once a day (01-07-21 @ 16:39)  metoprolol tartrate 25 mg oral tablet: 1 tab(s) orally 3 times a day (01-07-21 @ 15:27)  Vitamin B12:  (01-07-21 @ 15:27)  Vitamin C: 1 tab(s) orally once a day (01-07-21 @ 15:27)  Vitamin D3: 1 tab(s) orally once a day (01-07-21 @ 15:27)      LABS:                        14.5   11.71 )-----------( 366      ( 16 Jan 2021 07:10 )             44.2     01-16    142  |  106  |  28<H>  ----------------------------<  87  4.1   |  28  |  0.88    Ca    8.6      16 Jan 2021 07:10  Phos  2.8     01-15  Mg     2.1     01-16    TPro  6.1  /  Alb  2.5<L>  /  TBili  0.4  /  DBili  0.1  /  AST  32  /  ALT  37  /  AlkPhos  74  01-16    COVID  01-07-21 @ 14:30  COVID -   Detected<!!>  10-03-20 @ 15:15  COVID -   NotDetec      COVID Biomarkers    01-13-21 @ 07:19 ESR --  ---  CRP 0.94<H>  ---  DDimer  262<H>   ---   <H>   ---   Ferritin 467<H>    01-07-21 @ 14:30 ESR --  ---  CRP --  ---  DDimer  172   ---   LDH --   ---   Ferritin --            Trend Cardiac Enzymes    Trend BNP  01-07-21 @ 14:30   -  332<H>    Procalcitonin Trend  01-07-21 @ 14:30   -   0.15<H>    WBC Trend  01-16-21 @ 07:10   -  11.71<H>  01-15-21 @ 05:30   -  12.50<H>  01-14-21 @ 07:15   -  9.33    H/H Trend  01-16-21 @ 07:10   -  14.5 / 44.2  01-15-21 @ 05:30   -  13.9 / 42.7  01-14-21 @ 07:15   -  13.0 / 38.7<L>    Platelet Trend  01-16-21 @ 07:10   -  366  01-15-21 @ 05:30   -  365  01-14-21 @ 07:15   -  320    Trend Bun/Cr  01-16-21 @ 07:10  BUN/CR -  28<H> / 0.88  01-15-21 @ 05:30  BUN/CR -  31<H> / 0.82  01-14-21 @ 07:15  BUN/CR -  37<H> / 0.81    Lactic Acid Trend  01-07-21 @ 22:45   -   1.1  01-07-21 @ 14:30   -   2.6<H>    ABG Trend  02-05-15 @ 02:37   - 7.39/34/78/95    CULTURES: (if applicable)    Culture - Urine (collected 01-08-21 @ 16:25)  Source: .Urine Clean Catch (Midstream)  Final Report (01-09-21 @ 17:06):    <10,000 CFU/mL Normal Urogenital Eden    Culture - Blood (collected 01-07-21 @ 14:35)  Source: .Blood Blood-Peripheral  Final Report (01-12-21 @ 19:00):    No Growth Final    Culture - Blood (collected 01-07-21 @ 14:30)  Source: .Blood Blood-Peripheral  Final Report (01-12-21 @ 19:00):    No Growth Final      RADIOLOGY  CXR:    < from: Xray Chest 1 View AP/PA (01.07.21 @ 14:29) >    EXAM:  XR CHEST AP OR PA 1V      PROCEDURE DATE:  01/07/2021        INTERPRETATION:  AP chest on January 7, 2021 at 2:18 PM. Patient has fever. 2 images. There is history of left upper lobe wedge resection. Patient has history of carotid stenosis and hypertension. There is history of colon cancer with metastatic involvement of liver and lung.    Clips in the neck again seen. Heart magnified by technique.    There are scattered mid and lower lung field infiltrates increased from CAT scan of June23, 2020. Covid virus is in the differential.    IMPRESSION: Bilateral lung infiltrates suspect for Covid virus.        < end of copied text >    CT:  EXAM:  CT CHEST IC    EXAM:  CT ABDOMEN AND PELVIS OC IC        PROCEDURE DATE:  06/23/2020          INTERPRETATION:  CLINICAL INFORMATION: COLON CANCER ADMDIAG1: C18.9 MALIGNANT NEOPLASM OF COLON, UNSPECIFIED/    COMPARISON: 8.13.18.    PROCEDURE:  CT of the Chest, Abdomen and Pelvis was performed with intravenous contrast.   Intravenous contrast: 90 ml Omnipaque 350. 10 ml discarded.  Oral contrast: positive contrast was administered.  Sagittal and coronal reformats were performed.    FINDINGS:    CHEST:     LUNGS AND LARGE AIRWAYS: Patent central airways. Left upper lobe wedge resection. A 3 mm nodule in the lingula on series 4 image 68 is stable to slightly more conspicuous.  PLEURA: No pleural effusion.  VESSELS: Within normal limits.  HEART: Heart size is normal.  No pericardial effusion.  MEDIASTINUM AND MIGUEL: No lymphadenopathy.   CHEST WALL AND LOWER NECK: Right chest wall chemotherapy port.     ABDOMEN AND PELVIS:    LIVER: No liver mass. Postsurgical changes along the liver dome.  BILE DUCTS: Normal caliber.  GALLBLADDER: Within normal limits.  SPLEEN: Within normal limits.   PANCREAS: Within normal limits.  ADRENALS: Within normal limits.   KIDNEYS/URETERS: Cysts and other lesions too small to characterize.     BLADDER:Within normal limits.   REPRODUCTIVE ORGANS: Within normal limits.    BOWEL: No bowel obstruction. Right hemicolectomy.  PERITONEUM: No ascites.   VESSELS:  Within normal limits.  RETROPERITONEUM/LYMPH NODES: No lymphadenopathy.     ABDOMINAL WALL: Within normal limits.  BONES: Within normal limits.     IMPRESSION: A 3 mm left upper lobe nodule stable to slightly enlarged; continued follow-up is recommended.    Otherwise, stable exam.      VITALS:  T(C): 36.8 (01-16-21 @ 12:53), Max: 36.8 (01-16-21 @ 12:53)  T(F): 98.2 (01-16-21 @ 12:53), Max: 98.2 (01-16-21 @ 12:53)  HR: 62 (01-16-21 @ 12:53) (62 - 70)  BP: 148/88 (01-16-21 @ 12:53) (148/88 - 163/62)  BP(mean): --  ABP: --  ABP(mean): --  RR: 15 (01-16-21 @ 12:53) (15 - 19)  SpO2: 95% (01-16-21 @ 12:53) (95% - 96%)  CVP(mm Hg): --  CVP(cm H2O): --    Ins and Outs     01-15-21 @ 07:01  -  01-16-21 @ 07:00  --------------------------------------------------------  IN: 650 mL / OUT: 0 mL / NET: 650 mL                I&O's Detail    15 Ovi 2021 07:01  -  16 Jan 2021 07:00  --------------------------------------------------------  IN:    IV PiggyBack: 250 mL    Oral Fluid: 400 mL  Total IN: 650 mL    OUT:  Total OUT: 0 mL    Total NET: 650 mL          Physical Examination:  GENERAL:               Alert, Oriented, No acute distress.    HEENT:                    Pupils equal, reactive to light.  Symmetric. No JVD, Moist MM  PULM:                     Bilateral air entry, Clear to auscultation bilaterally, no significant sputum production, No Rales, No Rhonchi, No Wheezing  CVS:                         S1, S2,  No Murmur  ABD:                        Soft, nondistended, nontender, normoactive bowel sounds,   EXT:                         No edema, nontender, No Cyanosis or Clubbing   Vascular:                Warm Extremities, Normal Capillary refill, Normal Distal Pulses  SKIN:                       Warm and well perfused, no rashes noted.   NEURO:                  Alert, oriented, interactive, nonfocal, follows commands  PSYC:                      Calm, + Insight.     PULMONARY CONSULT  Location of Patient : [facility] [address] ([facility]  Attending requesting Consult:Gordy Willis  Chief Complaint :  SOB  Reason For consult : hypoxia      Initial HPI on admission:  HPI:  70 year old male with h/o Colon cancer with adenocarcinoma with mets to liver and lung s/p metastectomy in 2/2017 s/p chemo, HTN, high chol, who /p SOB x few days on 1/7/21.   in ED found to be hypoxic and agustín olvera showed: + COVID started on decadron and remdisivir, unable to be weaned from o2 and pulmonary consult called.     at this time patient states he feels well  no cp, sob, cough  denies fever/chills, n/v    currently sat 90% on 4 L     PAST MEDICAL & SURGICAL HISTORY:  Lung metastasis  Cataract of left eye  Atherosclerosis  DJD (degenerative joint disease)  Lumbar stenosis  Snoring  DREW precautions -- responds affirmatively to STOP BANG questionnaire -- admits to loud snoring; age &gt; 50; h/o htn; gender, male; h/o htn  Clostridium difficile infection on short term vancomyacin  Reflux  Depressionsts was depressed  Liver cancer  METS from the colon -- has received chemotherapy  Colon cancer  had surgery in March 2014 with mets to liver-- received chemotherapy  Arthropathy  left hip &gt; right hip    Carotid artery occlusion  left side in November 2013  Left CEA 11/2013    Osteoarthritis    Hyperlipidemia    CVA (cerebral vascular accident)  2005 -- wife and patient state no residual    Hypertension    H/O resection of liver  2015    Colon cancer  [facility] 2014 for chemotherapy due to colon cancer with METS to liver    Colon cancer  diagnosed in March 2014 with subsequent colon cancer followed with chemotherapy    H/O skin graft  1984    S/P carotid endarterectomy  11/2013  left side      Allergies    No Known Allergies    Intolerances      FAMILY HISTORY:  Family history of stomach cancer (Sibling)    Family history of colon cancer (Sibling)      Social history:      Smoking: Ex smoker     Drinking: social     Drug use: denies      Review of Systems: as stated above    CONSTITUTIONAL: No fever, No chills, No fatigue  EYES: No eye pain, No visual disturbances, No discharge  ENMT:  No difficulty hearing, No tinnitus, No vertigo; No sinus or throat pain  NECK: No pain, No stiffness  RESPIRATORY: No Cough, No SOB,   CARDIOVASCULAR: No chest pain, No palpitations, No dizziness, or No leg swelling  GASTROINTESTINAL: No abdominal or epigastric pain. No nausea, No vomiting, No hematemesis; No diarrhea, No constipation. No melena, No hematochezia.  GENITOURINARY: No dysuria, No frequency, No hematuria, No incontinence  NEUROLOGICAL: No headaches, No memory loss, No loss of strength, No numbness, No tremors  SKIN: No itching, No burning, No rashes, No lesions   MUSCULOSKELETAL: No joint pain or swelling; No muscle, back, No extremity pain  PSYCHIATRIC: No depression, No anxiety, No mood swings, No difficulty sleeping      Medications:  MEDICATIONS  (STANDING):  amLODIPine   Tablet 5 milliGRAM(s) Oral daily  ascorbic acid 500 milliGRAM(s) Oral daily  aspirin enteric coated 81 milliGRAM(s) Oral daily  cholecalciferol 1000 Unit(s) Oral daily  enoxaparin Injectable 40 milliGRAM(s) SubCutaneous daily  famotidine    Tablet 20 milliGRAM(s) Oral daily  hydrochlorothiazide 12.5 milliGRAM(s) Oral daily  hydrochlorothiazide 12.5 milliGRAM(s) Oral once  influenza   Vaccine 0.5 milliLiter(s) IntraMuscular once  losartan 100 milliGRAM(s) Oral daily  metoprolol tartrate 25 milliGRAM(s) Oral two times a day  remdesivir  IVPB 100 milliGRAM(s) IV Intermittent every 24 hours  zinc sulfate 220 milliGRAM(s) Oral daily    MEDICATIONS  (PRN):  acetaminophen   Tablet .. 650 milliGRAM(s) Oral every 6 hours PRN Temp greater or equal to 38C (100.4F), Moderate Pain (4 - 6)  guaifenesin/dextromethorphan  Syrup 100 milliLiter(s) Oral every 6 hours PRN Cough      Home Medications:  Last Order Reconciliation Date: 01-07-21 @ 17:40 (Admission Reconciliation)  amLODIPine 5 mg oral tablet: orally 2 times a day (01-07-21 @ 15:27)  aspirin 81 mg oral tablet: 1 tab(s) orally once a day - last dose 12/29/2016 (01-07-21 @ 15:27)  losartan-hydrochlorothiazide 100mg-12.5mg oral tablet: 1 tab(s) orally once a day (01-07-21 @ 16:39)  metoprolol tartrate 25 mg oral tablet: 1 tab(s) orally 3 times a day (01-07-21 @ 15:27)  Vitamin B12:  (01-07-21 @ 15:27)  Vitamin C: 1 tab(s) orally once a day (01-07-21 @ 15:27)  Vitamin D3: 1 tab(s) orally once a day (01-07-21 @ 15:27)      LABS:                        14.5   11.71 )-----------( 366      ( 16 Jan 2021 07:10 )             44.2     01-16    142  |  106  |  28<H>  ----------------------------<  87  4.1   |  28  |  0.88    Ca    8.6      16 Jan 2021 07:10  Phos  2.8     01-15  Mg     2.1     01-16    TPro  6.1  /  Alb  2.5<L>  /  TBili  0.4  /  DBili  0.1  /  AST  32  /  ALT  37  /  AlkPhos  74  01-16    COVID  01-07-21 @ 14:30  COVID -   Detected<!!>  10-03-20 @ 15:15  COVID -   NotDetec      COVID Biomarkers    01-13-21 @ 07:19 ESR --  ---  CRP 0.94<H>  ---  DDimer  262<H>   ---   <H>   ---   Ferritin 467<H>    01-07-21 @ 14:30 ESR --  ---  CRP --  ---  DDimer  172   ---   LDH --   ---   Ferritin --      Trend BNP  01-07-21 @ 14:30   -  332<H>    Procalcitonin Trend  01-07-21 @ 14:30   -   0.15<H>    WBC Trend  01-16-21 @ 07:10   -  11.71<H>  01-15-21 @ 05:30   -  12.50<H>  01-14-21 @ 07:15   -  9.33    H/H Trend  01-16-21 @ 07:10   -  14.5 / 44.2  01-15-21 @ 05:30   -  13.9 / 42.7  01-14-21 @ 07:15   -  13.0 / 38.7<L>    Platelet Trend  01-16-21 @ 07:10   -  366  01-15-21 @ 05:30   -  365  01-14-21 @ 07:15   -  320    Trend Bun/Cr  01-16-21 @ 07:10  BUN/CR -  28<H> / 0.88  01-15-21 @ 05:30  BUN/CR -  31<H> / 0.82  01-14-21 @ 07:15  BUN/CR -  37<H> / 0.81    Lactic Acid Trend  01-07-21 @ 22:45   -   1.1  01-07-21 @ 14:30   -   2.6<H>    ABG Trend  02-05-15 @ 02:37   - 7.39/34/78/95    CULTURES: (if applicable)    Culture - Urine (collected 01-08-21 @ 16:25)  Source: .Urine Clean Catch (Midstream)  Final Report (01-09-21 @ 17:06):    <10,000 CFU/mL Normal Urogenital Eden    Culture - Blood (collected 01-07-21 @ 14:35)  Source: .Blood Blood-Peripheral  Final Report (01-12-21 @ 19:00):    No Growth Final    Culture - Blood (collected 01-07-21 @ 14:30)  Source: .Blood Blood-Peripheral  Final Report (01-12-21 @ 19:00):    No Growth Final      RADIOLOGY  CXR:    < from: Xray Chest 1 View AP/PA (01.07.21 @ 14:29) >    EXAM:  XR CHEST AP OR PA 1V      PROCEDURE DATE:  01/07/2021        INTERPRETATION:  AP chest on January 7, 2021 at 2:18 PM. Patient has fever. 2 images. There is history of left upper lobe wedge resection. Patient has history of carotid stenosis and hypertension. There is history of colon cancer with metastatic involvement of liver and lung.    Clips in the neck again seen. Heart magnified by technique.    There are scattered mid and lower lung field infiltrates increased from CAT scan of June23, 2020. Covid virus is in the differential.    IMPRESSION: Bilateral lung infiltrates suspect for Covid virus.        < end of copied text >    CT:  EXAM:  CT CHEST IC    EXAM:  CT ABDOMEN AND PELVIS OC IC        PROCEDURE DATE:  06/23/2020          INTERPRETATION:  CLINICAL INFORMATION: COLON CANCER ADMDIAG1: C18.9 MALIGNANT NEOPLASM OF COLON, UNSPECIFIED/    COMPARISON: 8.13.18.    PROCEDURE:  CT of the Chest, Abdomen and Pelvis was performed with intravenous contrast.   Intravenous contrast: 90 ml Omnipaque 350. 10 ml discarded.  Oral contrast: positive contrast was administered.  Sagittal and coronal reformats were performed.    FINDINGS:    CHEST:     LUNGS AND LARGE AIRWAYS: Patent central airways. Left upper lobe wedge resection. A 3 mm nodule in the lingula on series 4 image 68 is stable to slightly more conspicuous.  PLEURA: No pleural effusion.  VESSELS: Within normal limits.  HEART: Heart size is normal.  No pericardial effusion.  MEDIASTINUM AND MIGUEL: No lymphadenopathy.   CHEST WALL AND LOWER NECK: Right chest wall chemotherapy port.     ABDOMEN AND PELVIS:    LIVER: No liver mass. Postsurgical changes along the liver dome.  BILE DUCTS: Normal caliber.  GALLBLADDER: Within normal limits.  SPLEEN: Within normal limits.   PANCREAS: Within normal limits.  ADRENALS: Within normal limits.   KIDNEYS/URETERS: Cysts and other lesions too small to characterize.     BLADDER:Within normal limits.   REPRODUCTIVE ORGANS: Within normal limits.    BOWEL: No bowel obstruction. Right hemicolectomy.  PERITONEUM: No ascites.   VESSELS:  Within normal limits.  RETROPERITONEUM/LYMPH NODES: No lymphadenopathy.     ABDOMINAL WALL: Within normal limits.  BONES: Within normal limits.     IMPRESSION: A 3 mm left upper lobe nodule stable to slightly enlarged; continued follow-up is recommended.    Otherwise, stable exam.      VITALS:  T(C): 36.8 (01-16-21 @ 12:53), Max: 36.8 (01-16-21 @ 12:53)  T(F): 98.2 (01-16-21 @ 12:53), Max: 98.2 (01-16-21 @ 12:53)  HR: 62 (01-16-21 @ 12:53) (62 - 70)  BP: 148/88 (01-16-21 @ 12:53) (148/88 - 163/62)  BP(mean): --  ABP: --  ABP(mean): --  RR: 15 (01-16-21 @ 12:53) (15 - 19)  SpO2: 95% (01-16-21 @ 12:53) (95% - 96%)  CVP(mm Hg): --  CVP(cm H2O): --    Ins and Outs     01-15-21 @ 07:01  -  01-16-21 @ 07:00  --------------------------------------------------------  IN: 650 mL / OUT: 0 mL / NET: 650 mL                I&O's Detail    15 Ovi 2021 07:01  -  16 Jan 2021 07:00  --------------------------------------------------------  IN:    IV PiggyBack: 250 mL    Oral Fluid: 400 mL  Total IN: 650 mL    OUT:  Total OUT: 0 mL    Total NET: 650 mL          Physical Examination:  GENERAL:               Alert, Oriented, No acute distress.    HEENT:                    No JVD, Moist MM  PULM:                     Bilateral air entry, diminishedto auscultation bilaterally, no significant sputum production, No Rales, No Rhonchi, No Wheezing  CVS:                         S1, S2,  +Murmur  ABD:                        Soft, nondistended, nontender, normoactive bowel sounds,   EXT:                         No edema, nontender, No Cyanosis mild Clubbing   NEURO:                  Alert, oriented, interactive, nonfocal, follows commands  PSYC:                      Calm, + Insight.

## 2021-01-16 NOTE — PROGRESS NOTE ADULT - ATTENDING COMMENTS
70 male with pmh colon adenocarcinoma, oligo-metastatic to liver (3/2014), and metastatic to lung (2016) presented with Acute hypoxic repiratory distress likely due to COVID 19 PNA    - C/w Oxygen; keep Pulse Ox > 92%  - C/w Remdisevre and Dexa  - F/u CTA chest   - Pul consult appreciated

## 2021-01-16 NOTE — PROGRESS NOTE ADULT - ASSESSMENT
70 male with pmh colon adenocarcinoma, oligo-metastatic to liver (3/2014), and metastatic to lung (2016), s/p lung metastaectomy (2/2017), last chemo with capecitabine (8/2015), htn, hld, presents with sob, progressive BRYAN, fatigue, malaise, poor po intake admitted with Covid pneumonia with hypoxia, KARYNA and electrolyte abnormalities.    # Severe sepsis and acute hypoxic respiratory failure 2/2 Covid pneumonia   # Lactic acidosis; resolved  - Patient completed 5 days of remdesivir. Continue w/ extended course of remdesivir until can wean oxygen further.   - Decadron day 9 of 10  - famotidine, supplemental vitamins   - robitussin DM PRN  - encouraged Incentive spirometry  - encouraged self proning in bed as much as possible.  - blood cultures no growth - final read  - inflammatory markers flat  - pt already on aspirin 81mg, will have to consider extended course of VTE ppx upon hospital d/c    # KARYNA  # Hyponatremia  -Resolved  -sodium stable    # Hypokalemia  -replete prn. resolved. monitor q daily    # Steroid induced leukocytosis  -Resolved, Monitor, afebrile, no evidence of uncontrolled secondary infectious process. can check procal as needed    # CAD  # HTN  chronic conditions  continue asa 81 daily  norvasc 5 daily, metoprolol 25 BID  holding valsartan and HCTZ 25 mg qd 2/2 KARYNA (on admission) - BP has essentially been at a reasonable goal of SBP~140    # H/O Colon Adenocarcinoma with mets  in remission with no evidence of recurrent dz  follows heme/onc as outpatient, last visit 1/5/21    # [facility]  full code  - Contact is Александр [phone], we spoke again today. I encouraged patience. LMOM for pt's PCP - awaiting return call - primary care doctor: Dr. Figueroa     70 male with pmh colon adenocarcinoma, oligo-metastatic to liver (3/2014), and metastatic to lung (2016), s/p lung metastaectomy (2/2017), last chemo with capecitabine (8/2015), htn, hld, presents with sob, progressive BRYAN, fatigue, malaise, poor po intake admitted with Covid pneumonia with hypoxia, KARYNA and electrolyte abnormalities.    # Severe sepsis and acute hypoxic respiratory failure 2/2 Covid pneumonia   # Lactic acidosis; resolved  - Patient completed 5 days of remdesivir. Continue w/ extended course of remdesivir until can wean oxygen further.   - Decadron day 10 of 10  -FU CTA chest to r/o PE or additional etiology of persistent hypoxia   -Pulmonology consult   - famotidine, supplemental vitamins   - robitussin DM PRN  - encouraged Incentive spirometry  - encouraged self proning in bed as much as possible.  - blood cultures no growth - final read  - inflammatory markers flat  - pt already on aspirin 81mg, will have to consider extended course of VTE ppx upon hospital d/c    # KARYNA  # Hyponatremia  -Resolved  -monitor BMP    # Hypokalemia  -resolved  -monitor BMP    # Steroid induced leukocytosis  -Resolved, Monitor, afebrile, no evidence of uncontrolled secondary infectious process. can check procal as needed    # CAD  # HTN  chronic conditions  continue asa 81 daily  norvasc 5 daily, metoprolol 25 BID  restart valsartan 100mg and HCTZ 12.5 mg       # H/O Colon Adenocarcinoma with mets  in remission with no evidence of recurrent dz  follows heme/onc as outpatient, last visit 1/5/21    # [facility]  full code  - Contact is Александр [phone], updated on status and plan of care  primary care doctor: Dr. Figueroa     70 male with pmh colon adenocarcinoma, oligo-metastatic to liver (3/2014), and metastatic to lung (2016), s/p lung metastaectomy (2/2017), last chemo with capecitabine (8/2015), htn, hld, presents with sob, progressive BRYAN, fatigue, malaise, poor po intake admitted with Covid pneumonia with hypoxia, KARYNA and electrolyte abnormalities.    # Severe sepsis and acute hypoxic respiratory failure 2/2 Covid pneumonia   # Lactic acidosis; resolved  - Patient completed 5 days of remdesivir. Continue w/ extended course of remdesivir until can wean oxygen further.   - Decadron day 10 of 10  -FU CTA chest to r/o PE or additional etiology of persistent hypoxia   -Pulmonology consult   - famotidine, supplemental vitamins   - robitussin DM PRN  - encouraged Incentive spirometry  - encouraged self proning in bed as much as possible.  - blood cultures no growth - final read  - inflammatory markers flat  - pt already on aspirin 81mg, will have to consider extended course of VTE ppx upon hospital d/c    # KARYNA  # Hyponatremia  -Resolved  -monitor BMP    # Hypokalemia  -resolved  -monitor BMP    # Steroid induced leukocytosis  -Resolved, Monitor, afebrile, no evidence of uncontrolled secondary infectious process. can check procal as needed    # CAD  # HTN  chronic conditions  continue asa 81 daily  norvasc 5 daily, metoprolol 25 BID  restart valsartan 100mg and HCTZ 12.5 mg       # H/O Colon Adenocarcinoma with mets  in remission with no evidence of recurrent dz  follows heme/onc as outpatient, last visit 1/5/21    # [facility]  full code  - Contact is Александр [phone], updated on status and plan of care  primary care doctor: Dr. Figueroa

## 2021-01-17 LAB
ALBUMIN SERPL ELPH-MCNC: 2.4 G/DL — LOW (ref 3.3–5)
ALP SERPL-CCNC: 83 U/L — SIGNIFICANT CHANGE UP (ref 40–120)
ALT FLD-CCNC: 46 U/L — HIGH (ref 10–45)
ANION GAP SERPL CALC-SCNC: 6 MMOL/L — SIGNIFICANT CHANGE UP (ref 5–17)
AST SERPL-CCNC: 32 U/L — SIGNIFICANT CHANGE UP (ref 10–40)
BILIRUB DIRECT SERPL-MCNC: 0.2 MG/DL — SIGNIFICANT CHANGE UP (ref 0–0.2)
BILIRUB INDIRECT FLD-MCNC: 0.3 MG/DL — SIGNIFICANT CHANGE UP (ref 0.2–1)
BILIRUB SERPL-MCNC: 0.5 MG/DL — SIGNIFICANT CHANGE UP (ref 0.2–1.2)
BUN SERPL-MCNC: 23 MG/DL — SIGNIFICANT CHANGE UP (ref 7–23)
CALCIUM SERPL-MCNC: 8.8 MG/DL — SIGNIFICANT CHANGE UP (ref 8.4–10.5)
CHLORIDE SERPL-SCNC: 104 MMOL/L — SIGNIFICANT CHANGE UP (ref 96–108)
CO2 SERPL-SCNC: 31 MMOL/L — SIGNIFICANT CHANGE UP (ref 22–31)
CREAT SERPL-MCNC: 0.86 MG/DL — SIGNIFICANT CHANGE UP (ref 0.5–1.3)
CREAT SERPL-MCNC: 0.95 MG/DL — SIGNIFICANT CHANGE UP (ref 0.5–1.3)
GLUCOSE SERPL-MCNC: 114 MG/DL — HIGH (ref 70–99)
HCT VFR BLD CALC: 45.3 % — SIGNIFICANT CHANGE UP (ref 39–50)
HGB BLD-MCNC: 15.2 G/DL — SIGNIFICANT CHANGE UP (ref 13–17)
MCHC RBC-ENTMCNC: 31.1 PG — SIGNIFICANT CHANGE UP (ref 27–34)
MCHC RBC-ENTMCNC: 33.6 GM/DL — SIGNIFICANT CHANGE UP (ref 32–36)
MCV RBC AUTO: 92.6 FL — SIGNIFICANT CHANGE UP (ref 80–100)
NRBC # BLD: 0 /100 WBCS — SIGNIFICANT CHANGE UP (ref 0–0)
PLATELET # BLD AUTO: 350 K/UL — SIGNIFICANT CHANGE UP (ref 150–400)
POTASSIUM SERPL-MCNC: 4 MMOL/L — SIGNIFICANT CHANGE UP (ref 3.5–5.3)
POTASSIUM SERPL-SCNC: 4 MMOL/L — SIGNIFICANT CHANGE UP (ref 3.5–5.3)
PROT SERPL-MCNC: 6.1 G/DL — SIGNIFICANT CHANGE UP (ref 6–8.3)
RBC # BLD: 4.89 M/UL — SIGNIFICANT CHANGE UP (ref 4.2–5.8)
RBC # FLD: 12.9 % — SIGNIFICANT CHANGE UP (ref 10.3–14.5)
SODIUM SERPL-SCNC: 141 MMOL/L — SIGNIFICANT CHANGE UP (ref 135–145)
WBC # BLD: 15.74 K/UL — HIGH (ref 3.8–10.5)
WBC # FLD AUTO: 15.74 K/UL — HIGH (ref 3.8–10.5)

## 2021-01-17 PROCEDURE — 99233 SBSQ HOSP IP/OBS HIGH 50: CPT | Mod: CS

## 2021-01-17 RX ORDER — DEXAMETHASONE 0.5 MG/5ML
6 ELIXIR ORAL DAILY
Refills: 0 | Status: DISCONTINUED | OUTPATIENT
Start: 2021-01-17 | End: 2021-01-19

## 2021-01-17 RX ADMIN — ENOXAPARIN SODIUM 40 MILLIGRAM(S): 100 INJECTION SUBCUTANEOUS at 12:59

## 2021-01-17 RX ADMIN — ZINC SULFATE TAB 220 MG (50 MG ZINC EQUIVALENT) 220 MILLIGRAM(S): 220 (50 ZN) TAB at 12:59

## 2021-01-17 RX ADMIN — BUDESONIDE AND FORMOTEROL FUMARATE DIHYDRATE 2 PUFF(S): 160; 4.5 AEROSOL RESPIRATORY (INHALATION) at 20:23

## 2021-01-17 RX ADMIN — LOSARTAN POTASSIUM 100 MILLIGRAM(S): 100 TABLET, FILM COATED ORAL at 05:35

## 2021-01-17 RX ADMIN — AMLODIPINE BESYLATE 5 MILLIGRAM(S): 2.5 TABLET ORAL at 05:35

## 2021-01-17 RX ADMIN — BUDESONIDE AND FORMOTEROL FUMARATE DIHYDRATE 2 PUFF(S): 160; 4.5 AEROSOL RESPIRATORY (INHALATION) at 09:28

## 2021-01-17 RX ADMIN — Medication 25 MILLIGRAM(S): at 16:53

## 2021-01-17 RX ADMIN — Medication 81 MILLIGRAM(S): at 12:59

## 2021-01-17 RX ADMIN — FAMOTIDINE 20 MILLIGRAM(S): 10 INJECTION INTRAVENOUS at 13:00

## 2021-01-17 RX ADMIN — Medication 1000 UNIT(S): at 12:59

## 2021-01-17 RX ADMIN — Medication 6 MILLIGRAM(S): at 16:51

## 2021-01-17 RX ADMIN — Medication 500 MILLIGRAM(S): at 12:59

## 2021-01-17 RX ADMIN — Medication 12.5 MILLIGRAM(S): at 05:35

## 2021-01-17 RX ADMIN — Medication 25 MILLIGRAM(S): at 05:36

## 2021-01-17 NOTE — PROGRESS NOTE ADULT - ATTENDING COMMENTS
70 male with pmh colon adenocarcinoma, oligo-metastatic to liver (3/2014), and metastatic to lung (2016) presented with Acute hypoxic respiratory distress likely due to COVID 19 PNA    - C/w Oxygen; keep Pulse Ox > 92%  - C/w Remdisevre and Dexa  - CTA chest noted w/o PE but possibly pulmonary fibrosis  - Started on Sybicort; likely going to need O2 as outpatient   - Pul consult appreciated .

## 2021-01-17 NOTE — PROGRESS NOTE ADULT - SUBJECTIVE AND OBJECTIVE BOX
Follow-up Pulmonary Progress Note  Chief Complaint : Infection due to severe acute respiratory syndrome coronavirus 2 (SARS-CoV-2)        pt seen and examined now on 6 L n/c to maintin sat ~88-91 % rest  pt has no complaints.      Allergies :No Known Allergies      PAST MEDICAL & SURGICAL HISTORY:  Lung metastasis    Cataract of left eye    Atherosclerosis    DJD (degenerative joint disease)    Lumbar stenosis    Snoring  DREW precautions -- responds affirmatively to STOP BANG questionnaire -- admits to loud snoring; age &gt; 50; h/o htn; gender, male; h/o htn    Clostridium difficile infection  on short term vancomyacin    Reflux    Depression  sts was depressed &quot;I lost my brother while going to this&quot;    Liver cancer  METS from the colon -- has received chemotherapy    Colon cancer  had surgery in March 2014 with mets to liver-- received chemotherapy    Arthropathy  left hip &gt; right hip    Carotid artery occlusion  left side in November 2013  Left CEA 11/2013    Osteoarthritis    Hyperlipidemia    CVA (cerebral vascular accident)  2005 -- wife and patient state no residual    Hypertension    H/O resection of liver  2015    Colon cancer  [facility] 2014 for chemotherapy due to colon cancer with METS to liver    Colon cancer  diagnosed in March 2014 with subsequent colon cancer followed with chemotherapy    H/O skin graft  1984    S/P carotid endarterectomy  11/2013  left side        Medications:  MEDICATIONS  (STANDING):  amLODIPine   Tablet 5 milliGRAM(s) Oral daily  ascorbic acid 500 milliGRAM(s) Oral daily  aspirin enteric coated 81 milliGRAM(s) Oral daily  budesonide  80 MICROgram(s)/formoterol 4.5 MICROgram(s) Inhaler 2 Puff(s) Inhalation two times a day  cholecalciferol 1000 Unit(s) Oral daily  enoxaparin Injectable 40 milliGRAM(s) SubCutaneous daily  famotidine    Tablet 20 milliGRAM(s) Oral daily  hydrochlorothiazide 12.5 milliGRAM(s) Oral daily  influenza   Vaccine 0.5 milliLiter(s) IntraMuscular once  losartan 100 milliGRAM(s) Oral daily  metoprolol tartrate 25 milliGRAM(s) Oral two times a day  zinc sulfate 220 milliGRAM(s) Oral daily    MEDICATIONS  (PRN):  acetaminophen   Tablet .. 650 milliGRAM(s) Oral every 6 hours PRN Temp greater or equal to 38C (100.4F), Moderate Pain (4 - 6)  guaifenesin/dextromethorphan  Syrup 100 milliLiter(s) Oral every 6 hours PRN Cough    COVID  01-07-21 @ 14:30  COVID -   Detected<!!>  10-03-20 @ 15:15  COVID -   NotDetec      COVID Biomarkers    01-13-21 @ 07:19 ESR --  ---  CRP 0.94<H>  ---  DDimer  262<H>   ---   <H>   ---   Ferritin 467<H>    01-07-21 @ 14:30 ESR --  ---  CRP --  ---  DDimer  172   ---   LDH --   ---   Ferritin --        Trend BNP  01-07-21 @ 14:30   -  332<H>    Procalcitonin Trend  01-07-21 @ 14:30   -   0.15<H>    WBC Trend  01-17-21 @ 07:16   -  15.74<H>  01-16-21 @ 07:10   -  11.71<H>  01-15-21 @ 05:30   -  12.50<H>    H/H Trend  01-17-21 @ 07:16   -  15.2 / 45.3  01-16-21 @ 07:10   -  14.5 / 44.2  01-15-21 @ 05:30   -  13.9 / 42.7    Platelet Trend  01-17-21 @ 07:16   -  350  01-16-21 @ 07:10   -  366  01-15-21 @ 05:30   -  365    Trend Sodium  01-17-21 @ 07:16   -  141  01-16-21 @ 07:10   -  142  01-15-21 @ 05:30   -  143    Trend Potassium  01-17-21 @ 07:16   -  4.0  01-16-21 @ 07:10   -  4.1  01-15-21 @ 05:30   -  4.0    Trend Bun/Cr  01-17-21 @ 07:16  BUN/CR -  23 / 0.95  01-16-21 @ 07:10  BUN/CR -  28<H> / 0.88  01-15-21 @ 05:30  BUN/CR -  31<H> / 0.82    Lactic Acid Trend  01-07-21 @ 22:45   -   1.1  01-07-21 @ 14:30   -   2.6<H>    ABG Trend  02-05-15 @ 02:37   - 7.39/34/78/95    Trend AST/ALT/ALK Phos/Bili  01-17-21 @ 07:16   32/46<H>/83/0.5  01-16-21 @ 07:10   32/37/74/0.4  01-15-21 @ 05:30   27/35/76/0.4  01-14-21 @ 07:15   24/34/58/0.3  01-13-21 @ 07:19   30/38/59/0.4    LABS:                        15.2   15.74 )-----------( 350      ( 17 Jan 2021 07:16 )             45.3     01-17    141  |  104  |  23  ----------------------------<  114<H>  4.0   |  31  |  0.95    Ca    8.8      17 Jan 2021 07:16  Mg     2.1     01-16    TPro  6.1  /  Alb  2.4<L>  /  TBili  0.5  /  DBili  0.2  /  AST  32  /  ALT  46<H>  /  AlkPhos  83  01-17    CULTURES: (if applicable)    Culture - Urine (collected 01-08-21 @ 16:25)  Source: .Urine Clean Catch (Midstream)  Final Report (01-09-21 @ 17:06):    <10,000 CFU/mL Normal Urogenital [place]    Culture - Blood (collected 01-07-21 @ 14:35)  Source: .Blood Blood-Peripheral  Final Report (01-12-21 @ 19:00):    No Growth Final    Culture - Blood (collected 01-07-21 @ 14:30)  Source: .Blood Blood-Peripheral  Final Report (01-12-21 @ 19:00):    No Growth Final      < from: CT Angio Chest w/ IV Cont (01.16.21 @ 15:30) >    EXAM:  CT ANGIO CHEST (W)AW IC      PROCEDURE DATE:  01/16/2021        INTERPRETATION:  Clinical information: Hypoxia.  Covid infection. Evaluate for pulmonary embolus. Exam is compared to previous study of 6/23/2020.    CT angiogram of the chest was obtained following administration of intravenous contrast. Approximately 90 cc of Omnipaque 350 was administered and 10 cc was discarded. Coronal, sagittal and MIP images were submitted for review.    No hilar and/or mediastinal adenopathy is noted.    Heart is normal in size. No pericardial effusion is noted. Pulmonary arteries are normal in caliber. No filling defects are noted.    No endobronchial lesions are noted. Centrilobular emphysema is noted bilaterally. Patchy opacities are present within both lungs, more so in the peripheral aspect. No pleural effusions are noted.    Below the diaphragm, visualized portions of the abdomen are unremarkable.    Degenerative changes of the spine are noted.    Impression: No pulmonary embolus is noted.    Patchy opacities are noted within both lungs as described above. They're consistent with the patient's known history of Covid pneumonia.        < end of copied text >  On My review noted bibasilar honeycombing consistent with (?Early) pulmonary fibrosis.     VITALS:  T(C): 36.1 (01-17-21 @ 05:02), Max: 36.4 (01-16-21 @ 20:49)  T(F): 97 (01-17-21 @ 05:02), Max: 97.6 (01-16-21 @ 20:49)  HR: 68 (01-17-21 @ 05:02) (68 - 68)  BP: 162/74 (01-17-21 @ 05:02) (150/68 - 162/74)  BP(mean): --  ABP: --  ABP(mean): --  RR: 20 (01-17-21 @ 05:02) (16 - 20)  SpO2: 90% (01-17-21 @ 09:33) (86% - 97%)  CVP(mm Hg): --  CVP(cm H2O): --    Ins and Outs     01-16-21 @ 07:01  -  01-17-21 @ 07:00  --------------------------------------------------------  IN: 250 mL / OUT: 200 mL / NET: 50 mL                I&O's Detail    16 Jan 2021 07:01  -  17 Jan 2021 07:00  --------------------------------------------------------  IN:    IV PiggyBack: 250 mL  Total IN: 250 mL    OUT:    Voided (mL): 200 mL  Total OUT: 200 mL    Total NET: 50 mL

## 2021-01-17 NOTE — PROGRESS NOTE ADULT - ASSESSMENT
70 male with pmh colon adenocarcinoma, oligo-metastatic to liver (3/2014), and metastatic to lung (2016), s/p lung metastasectomy (2/2017), last chemo with capecitabine (8/2015), htn, hld, presents with sob, progressive BRYAN, fatigue, malaise, poor po intake admitted with Covid pneumonia with hypoxia, KARYNA and electrolyte abnormalities.    spO2 92% 6L NC    # Severe sepsis and acute hypoxic respiratory failure 2/2 Covid pneumonia   # Lactic acidosis; resolved  - Patient completed 5 days of remdesivir. Continue w/ extended course of remdesivir until can wean oxygen further.   - Decadron completed x 10 days  -CTA chest consistent with patchy opacities   -Pulmonology recs; Symbicort   - famotidine, supplemental vitamins   - Robitussin DM PRN  - Continue to encourage Incentive spirometry and Deep Breathing exercises   - encouraged self proning in bed as much as possible.  - blood cultures no growth - final read  - inflammatory markers flat  - pt already on aspirin 81mg, will have to consider extended course of VTE ppx upon hospital d/c    # KARYNA  # Hyponatremia  -Resolved  -monitor BMP    # Hypokalemia  -resolved  -monitor BMP    # Steroid induced leukocytosis  -WBC rising  - Monitor, afebrile, no evidence of uncontrolled secondary infectious process    # CAD  # HTN  chronic conditions  continue asa 81 daily  norvasc 5 daily, metoprolol 25 BID  restart valsartan 100mg and HCTZ 12.5 mg       # H/O Colon Adenocarcinoma with mets  in remission with no evidence of recurrent dz  follows heme/onc as outpatient, last visit 1/5/21    # [facility]  full code  - Contact is Александр [phone], updated on status and plan of care  primary care doctor: Dr. Figueroa

## 2021-01-17 NOTE — PROGRESS NOTE ADULT - SUBJECTIVE AND OBJECTIVE BOX
Patient is a 70y old  Male who presents with a chief complaint of Acute respiratory failure due to COVID       Patient seen and examined at bedside. States he feels a little better today with improvement in breathing, denies chest pain, SOB, n/v/d, dizziness.       ALLERGIES:  No Known Allergies    MEDICATIONS  (STANDING):  amLODIPine   Tablet 5 milliGRAM(s) Oral daily  ascorbic acid 500 milliGRAM(s) Oral daily  aspirin enteric coated 81 milliGRAM(s) Oral daily  budesonide  80 MICROgram(s)/formoterol 4.5 MICROgram(s) Inhaler 2 Puff(s) Inhalation two times a day  cholecalciferol 1000 Unit(s) Oral daily  enoxaparin Injectable 40 milliGRAM(s) SubCutaneous daily  famotidine    Tablet 20 milliGRAM(s) Oral daily  hydrochlorothiazide 12.5 milliGRAM(s) Oral daily  influenza   Vaccine 0.5 milliLiter(s) IntraMuscular once  losartan 100 milliGRAM(s) Oral daily  metoprolol tartrate 25 milliGRAM(s) Oral two times a day  zinc sulfate 220 milliGRAM(s) Oral daily    MEDICATIONS  (PRN):  acetaminophen   Tablet . 650 milliGRAM(s) Oral every 6 hours PRN Temp greater or equal to 38C (100.4F), Moderate Pain (4 - 6)  guaifenesin/dextromethorphan  Syrup 100 milliLiter(s) Oral every 6 hours PRN Cough    Vital Signs Last 24 Hrs  T(F): 97 (17 Jan 2021 05:02), Max: 98.2 (16 Jan 2021 12:53)  HR: 68 (17 Jan 2021 05:02) (62 - 68)  BP: 162/74 (17 Jan 2021 05:02) (148/88 - 162/74)  RR: 20 (17 Jan 2021 05:02) (15 - 20)  SpO2: 90% (17 Jan 2021 09:33) (86% - 97%)  I&O's Summary    16 Jan 2021 07:01  -  17 Jan 2021 07:00  --------------------------------------------------------  IN: 250 mL / OUT: 200 mL / NET: 50 mL      PHYSICAL EXAM:  General: NAD, A/O x 3  ENT: MMM, no oral thrush   Neck: Supple, No JVD  Lungs: Decreased bilaterally, non labored breathing  Cardio: RRR, S1/S2, No murmurs  Abdomen: Soft, Nontender, Nondistended; Bowel sounds present  Extremities: No calf tenderness, No pitting edema    LABS:                        15.2   15.74 )-----------( 350      ( 17 Jan 2021 07:16 )             45.3     01-17    141  |  104  |  23  ----------------------------<  114  4.0   |  31  |  0.95    Ca    8.8      17 Jan 2021 07:16  Phos  2.8     01-15  Mg     2.1     01-16    TPro  6.1  /  Alb  2.4  /  TBili  0.5  /  DBili  0.2  /  AST  32  /  ALT  46  /  AlkPhos  83  01-17    eGFR if Non African American: 80 mL/min/1.73M2 (01-17-21 @ 07:16)  eGFR if : 93 mL/min/1.73M2 (01-17-21 @ 07:16)          RADIOLOGY & ADDITIONAL TESTS:    < from: CT Angio Chest w/ IV Cont (01.16.21 @ 15:30) >    Impression: No pulmonary embolus is noted.    Patchy opacities are noted within both lungs as described above. They're consistent with the patient's known history of Covid pneumonia.          MICHELLE YOUNG MD; Attending Radiologist  This document has been electronically signed. Jan 16 2021  4:00PM    < end of copied text >    Care Discussed with Consultants/Other Providers:

## 2021-01-18 LAB
ALBUMIN SERPL ELPH-MCNC: 2.8 G/DL — LOW (ref 3.3–5)
ALP SERPL-CCNC: 100 U/L — SIGNIFICANT CHANGE UP (ref 40–120)
ALT FLD-CCNC: 51 U/L — HIGH (ref 10–45)
ANION GAP SERPL CALC-SCNC: 11 MMOL/L — SIGNIFICANT CHANGE UP (ref 5–17)
AST SERPL-CCNC: 86 U/L — HIGH (ref 10–40)
BILIRUB DIRECT SERPL-MCNC: 0.3 MG/DL — HIGH (ref 0–0.2)
BILIRUB INDIRECT FLD-MCNC: 0.4 MG/DL — SIGNIFICANT CHANGE UP (ref 0.2–1)
BILIRUB SERPL-MCNC: 0.7 MG/DL — SIGNIFICANT CHANGE UP (ref 0.2–1.2)
BUN SERPL-MCNC: 32 MG/DL — HIGH (ref 7–23)
CALCIUM SERPL-MCNC: 9 MG/DL — SIGNIFICANT CHANGE UP (ref 8.4–10.5)
CHLORIDE SERPL-SCNC: 100 MMOL/L — SIGNIFICANT CHANGE UP (ref 96–108)
CO2 SERPL-SCNC: 30 MMOL/L — SIGNIFICANT CHANGE UP (ref 22–31)
CREAT SERPL-MCNC: 1.35 MG/DL — HIGH (ref 0.5–1.3)
CREAT SERPL-MCNC: 1.37 MG/DL — HIGH (ref 0.5–1.3)
CRP SERPL-MCNC: 0.64 MG/DL — HIGH (ref 0–0.4)
D DIMER BLD IA.RAPID-MCNC: <150 NG/ML DDU — SIGNIFICANT CHANGE UP
FERRITIN SERPL-MCNC: 470 NG/ML — HIGH (ref 30–400)
GLUCOSE SERPL-MCNC: 158 MG/DL — HIGH (ref 70–99)
HCT VFR BLD CALC: 50 % — SIGNIFICANT CHANGE UP (ref 39–50)
HGB BLD-MCNC: 16.2 G/DL — SIGNIFICANT CHANGE UP (ref 13–17)
LDH SERPL L TO P-CCNC: 494 U/L — HIGH (ref 50–242)
MCHC RBC-ENTMCNC: 30.7 PG — SIGNIFICANT CHANGE UP (ref 27–34)
MCHC RBC-ENTMCNC: 32.4 GM/DL — SIGNIFICANT CHANGE UP (ref 32–36)
MCV RBC AUTO: 94.9 FL — SIGNIFICANT CHANGE UP (ref 80–100)
NRBC # BLD: 0 /100 WBCS — SIGNIFICANT CHANGE UP (ref 0–0)
PLATELET # BLD AUTO: 450 K/UL — HIGH (ref 150–400)
POTASSIUM SERPL-MCNC: 3.9 MMOL/L — SIGNIFICANT CHANGE UP (ref 3.5–5.3)
POTASSIUM SERPL-SCNC: 3.9 MMOL/L — SIGNIFICANT CHANGE UP (ref 3.5–5.3)
PROCALCITONIN SERPL-MCNC: 0.05 NG/ML — SIGNIFICANT CHANGE UP
PROT SERPL-MCNC: 6.7 G/DL — SIGNIFICANT CHANGE UP (ref 6–8.3)
RBC # BLD: 5.27 M/UL — SIGNIFICANT CHANGE UP (ref 4.2–5.8)
RBC # FLD: 13.5 % — SIGNIFICANT CHANGE UP (ref 10.3–14.5)
SODIUM SERPL-SCNC: 141 MMOL/L — SIGNIFICANT CHANGE UP (ref 135–145)
WBC # BLD: 17.51 K/UL — HIGH (ref 3.8–10.5)
WBC # FLD AUTO: 17.51 K/UL — HIGH (ref 3.8–10.5)

## 2021-01-18 PROCEDURE — 99233 SBSQ HOSP IP/OBS HIGH 50: CPT | Mod: CS

## 2021-01-18 RX ORDER — SODIUM CHLORIDE 9 MG/ML
1000 INJECTION INTRAMUSCULAR; INTRAVENOUS; SUBCUTANEOUS
Refills: 0 | Status: DISCONTINUED | OUTPATIENT
Start: 2021-01-18 | End: 2021-01-19

## 2021-01-18 RX ADMIN — ZINC SULFATE TAB 220 MG (50 MG ZINC EQUIVALENT) 220 MILLIGRAM(S): 220 (50 ZN) TAB at 11:50

## 2021-01-18 RX ADMIN — Medication 12.5 MILLIGRAM(S): at 04:50

## 2021-01-18 RX ADMIN — AMLODIPINE BESYLATE 5 MILLIGRAM(S): 2.5 TABLET ORAL at 04:50

## 2021-01-18 RX ADMIN — Medication 81 MILLIGRAM(S): at 11:50

## 2021-01-18 RX ADMIN — Medication 6 MILLIGRAM(S): at 04:51

## 2021-01-18 RX ADMIN — SODIUM CHLORIDE 100 MILLILITER(S): 9 INJECTION INTRAMUSCULAR; INTRAVENOUS; SUBCUTANEOUS at 11:49

## 2021-01-18 RX ADMIN — BUDESONIDE AND FORMOTEROL FUMARATE DIHYDRATE 2 PUFF(S): 160; 4.5 AEROSOL RESPIRATORY (INHALATION) at 10:09

## 2021-01-18 RX ADMIN — Medication 25 MILLIGRAM(S): at 04:50

## 2021-01-18 RX ADMIN — FAMOTIDINE 20 MILLIGRAM(S): 10 INJECTION INTRAVENOUS at 11:50

## 2021-01-18 RX ADMIN — ENOXAPARIN SODIUM 40 MILLIGRAM(S): 100 INJECTION SUBCUTANEOUS at 11:49

## 2021-01-18 RX ADMIN — Medication 1000 UNIT(S): at 11:50

## 2021-01-18 RX ADMIN — LOSARTAN POTASSIUM 100 MILLIGRAM(S): 100 TABLET, FILM COATED ORAL at 04:50

## 2021-01-18 RX ADMIN — BUDESONIDE AND FORMOTEROL FUMARATE DIHYDRATE 2 PUFF(S): 160; 4.5 AEROSOL RESPIRATORY (INHALATION) at 21:24

## 2021-01-18 RX ADMIN — Medication 500 MILLIGRAM(S): at 11:50

## 2021-01-18 RX ADMIN — Medication 25 MILLIGRAM(S): at 17:04

## 2021-01-18 RX ADMIN — SODIUM CHLORIDE 100 MILLILITER(S): 9 INJECTION INTRAMUSCULAR; INTRAVENOUS; SUBCUTANEOUS at 20:57

## 2021-01-18 NOTE — PROGRESS NOTE ADULT - ATTENDING COMMENTS
70 male with pmh colon adenocarcinoma, oligo-metastatic to liver (3/2014), and metastatic to lung (2016) presented with Acute hypoxic respiratory distress likely due to COVID 19 PNA    - C/w Oxygen; keep Pulse Ox > 92%  - C/w Remdisevre and Dexa  - CTA chest noted w/o PE but possibly pulmonary fibrosis  - Started on Sybicort; likely going to need O2 as outpatient   - Pul consult appreciated 70 male with pmh colon adenocarcinoma, oligo-metastatic to liver (3/2014), and metastatic to lung (2016) presented with Acute hypoxic respiratory distress likely due to COVID 19 PNA    - C/w Oxygen; keep Pulse Ox > 92%  - C/w Remdisevre and Dexa  - CTA chest noted w/o PE but possibly pulmonary fibrosis  - [id number] started on gentle hydration due to KARYNA  - Started on Sybicort; likely going to need O2 as outpatient   - Pul consult appreciated

## 2021-01-18 NOTE — PROGRESS NOTE ADULT - SUBJECTIVE AND OBJECTIVE BOX
Patient is a 70y old  Male who presents with a chief complaint of Acute respiratory failure due to COVID       Patient seen and examined at bedside. Pt states they feel well, denies overnight events or current complaints including chest pain, shortness of breath, dizziness, nausea, vomiting, diarrhea, fever or chills.      ALLERGIES:  No Known Allergies    MEDICATIONS  (STANDING):  amLODIPine   Tablet 5 milliGRAM(s) Oral daily  ascorbic acid 500 milliGRAM(s) Oral daily  aspirin enteric coated 81 milliGRAM(s) Oral daily  budesonide  80 MICROgram(s)/formoterol 4.5 MICROgram(s) Inhaler 2 Puff(s) Inhalation two times a day  cholecalciferol 1000 Unit(s) Oral daily  dexAMETHasone     Tablet 6 milliGRAM(s) Oral daily  enoxaparin Injectable 40 milliGRAM(s) SubCutaneous daily  famotidine    Tablet 20 milliGRAM(s) Oral daily  hydrochlorothiazide 12.5 milliGRAM(s) Oral daily  influenza   Vaccine 0.5 milliLiter(s) IntraMuscular once  losartan 100 milliGRAM(s) Oral daily  metoprolol tartrate 25 milliGRAM(s) Oral two times a day  zinc sulfate 220 milliGRAM(s) Oral daily    MEDICATIONS  (PRN):  acetaminophen   Tablet .. 650 milliGRAM(s) Oral every 6 hours PRN Temp greater or equal to 38C (100.4F), Moderate Pain (4 - 6)  guaifenesin/dextromethorphan  Syrup 10 milliLiter(s) Oral every 6 hours PRN Cough    Vital Signs Last 24 Hrs  T(F): 97.6 (18 Jan 2021 04:48), Max: 97.7 (17 Jan 2021 19:48)  HR: 66 (18 Jan 2021 04:48) (60 - 66)  BP: 127/62 (18 Jan 2021 04:48) (127/62 - 162/63)  RR: 12 (18 Jan 2021 04:48) (12 - 20)  SpO2: 95% (18 Jan 2021 04:48) (95% - 95%)  I&O's Summary    17 Jan 2021 07:01  -  18 Jan 2021 07:00  --------------------------------------------------------  IN: 480 mL / OUT: 600 mL / NET: -120 mL      PHYSICAL EXAM:  General: NAD, A/O x 3  ENT: MMM, no oral thrush   Neck: Supple, No JVD  Lungs: decreased bilaterally, non labored breathing  Cardio: RRR, S1/S2, No murmurs  Abdomen: Soft, Nontender, Nondistended; Bowel sounds present  Extremities: No calf tenderness, No pitting edema    LABS:                        16.2   17.51 )-----------( 450      ( 18 Jan 2021 06:15 )             50.0     01-18    141  |  100  |  32  ----------------------------<  158  3.9   |  30  |  1.37    Ca    9.0      18 Jan 2021 06:15  Mg     2.1     01-16    TPro  6.7  /  Alb  2.8  /  TBili  0.7  /  DBili  0.3  /  AST  86  /  ALT  51  /  AlkPhos  100  01-18    eGFR if Non African American: 52 mL/min/1.73M2 (01-18-21 @ 06:15)  eGFR if African American: 60 mL/min/1.73M2 (01-18-21 @ 06:15)                    RADIOLOGY & ADDITIONAL TESTS:    Care Discussed with Consultants/Other Providers:

## 2021-01-18 NOTE — PROGRESS NOTE ADULT - SUBJECTIVE AND OBJECTIVE BOX
Follow-up Pulmonary Progress Note  Chief Complaint : Infection due to severe acute respiratory syndrome coronavirus 2 (SARS-CoV-2)        patient seen and examined  sat 94% on 4 L, states no sob, no       Allergies :No Known Allergies      PAST MEDICAL & SURGICAL HISTORY:  Lung metastasis    Cataract of left eye    Atherosclerosis    DJD (degenerative joint disease)    Lumbar stenosis    Snoring  DREW precautions -- responds affirmatively to STOP BANG questionnaire -- admits to loud snoring; age &gt; 50; h/o htn; gender, male; h/o htn    Clostridium difficile infection  on short term vancomyacin    Reflux    Depression  sts was depressed &quot;I lost my brother while going to this&quot;    Liver cancer  METS from the colon -- has received chemotherapy    Colon cancer  had surgery in March 2014 with mets to liver-- received chemotherapy    Arthropathy  left hip &gt; right hip    Carotid artery occlusion  left side in November 2013  Left CEA 11/2013    Osteoarthritis    Hyperlipidemia    CVA (cerebral vascular accident)  2005 -- wife and patient state no residual    Hypertension    H/O resection of liver  2015    Colon cancer  [facility] 2014 for chemotherapy due to colon cancer with METS to liver    Colon cancer  diagnosed in March 2014 with subsequent colon cancer followed with chemotherapy    H/O skin graft  1984    S/P carotid endarterectomy  11/2013  left side        Medications:  MEDICATIONS  (STANDING):  amLODIPine   Tablet 5 milliGRAM(s) Oral daily  ascorbic acid 500 milliGRAM(s) Oral daily  aspirin enteric coated 81 milliGRAM(s) Oral daily  budesonide  80 MICROgram(s)/formoterol 4.5 MICROgram(s) Inhaler 2 Puff(s) Inhalation two times a day  cholecalciferol 1000 Unit(s) Oral daily  dexAMETHasone     Tablet 6 milliGRAM(s) Oral daily  enoxaparin Injectable 40 milliGRAM(s) SubCutaneous daily  famotidine    Tablet 20 milliGRAM(s) Oral daily  hydrochlorothiazide 12.5 milliGRAM(s) Oral daily  influenza   Vaccine 0.5 milliLiter(s) IntraMuscular once  losartan 100 milliGRAM(s) Oral daily  metoprolol tartrate 25 milliGRAM(s) Oral two times a day  sodium chloride 0.9%. 1000 milliLiter(s) (100 mL/Hr) IV Continuous <Continuous>  zinc sulfate 220 milliGRAM(s) Oral daily    MEDICATIONS  (PRN):  acetaminophen   Tablet .. 650 milliGRAM(s) Oral every 6 hours PRN Temp greater or equal to 38C (100.4F), Moderate Pain (4 - 6)  guaifenesin/dextromethorphan  Syrup 10 milliLiter(s) Oral every 6 hours PRN Cough      LABS:                        16.2   17.51 )-----------( 450      ( 18 Jan 2021 06:15 )             50.0     01-18    141  |  100  |  32<H>  ----------------------------<  158<H>  3.9   |  30  |  1.37<H>    Ca    9.0      18 Jan 2021 06:15    TPro  6.7  /  Alb  2.8<L>  /  TBili  0.7  /  DBili  0.3<H>  /  AST  86<H>  /  ALT  51<H>  /  AlkPhos  100  01-18    COVID  01-07-21 @ 14:30  COVID -   Detected<!!>  10-03-20 @ 15:15  COVID -   NotDetec      COVID Biomarkers    01-18-21 @ 06:15 ESR --  ---  CRP 0.64<H>  ---  DDimer  <150   ---   <H>   ---   Ferritin 470<H>    01-13-21 @ 07:19 ESR --  ---  CRP 0.94<H>  ---  DDimer  262<H>   ---   <H>   ---   Ferritin 467<H>    01-07-21 @ 14:30 ESR --  ---  CRP --  ---  DDimer  172   ---   LDH --   ---   Ferritin --      Procalcitonin, Serum: 0.05 ng/mL (01-18-21 @ 06:15)    CULTURES: (if applicable)    Culture - Urine (collected 01-08-21 @ 16:25)  Source: .Urine Clean Catch (Midstream)  Final Report (01-09-21 @ 17:06):    <10,000 CFU/mL Normal Urogenital Eden    Culture - Blood (collected 01-07-21 @ 14:35)  Source: .Blood Blood-Peripheral  Final Report (01-12-21 @ 19:00):    No Growth Final    Culture - Blood (collected 01-07-21 @ 14:30)  Source: .Blood Blood-Peripheral  Final Report (01-12-21 @ 19:00):    No Growth Final        VITALS:  T(C): 36.4 (01-18-21 @ 04:48), Max: 36.5 (01-17-21 @ 19:48)  T(F): 97.6 (01-18-21 @ 04:48), Max: 97.7 (01-17-21 @ 19:48)  HR: 66 (01-18-21 @ 04:48) (60 - 66)  BP: 127/62 (01-18-21 @ 04:48) (127/62 - 162/63)  BP(mean): --  ABP: --  ABP(mean): --  RR: 12 (01-18-21 @ 04:48) (12 - 20)  SpO2: 98% (01-18-21 @ 10:09) (95% - 98%)  CVP(mm Hg): --  CVP(cm H2O): --    Ins and Outs     01-17-21 @ 07:01  -  01-18-21 @ 07:00  --------------------------------------------------------  IN: 480 mL / OUT: 600 mL / NET: -120 mL                I&O's Detail    17 Jan 2021 07:01  -  18 Jan 2021 07:00  --------------------------------------------------------  IN:    Oral Fluid: 480 mL  Total IN: 480 mL    OUT:    Voided (mL): 600 mL  Total OUT: 600 mL    Total NET: -120 mL

## 2021-01-18 NOTE — PROGRESS NOTE ADULT - ASSESSMENT
Physical Examination:  GENERAL:               Alert, Oriented, No acute distress.    HEENT:                    No JVD, Moist MM  PULM:                     Bilateral air entry, diminishedto auscultation bilaterally, no significant sputum production, No Rales, No Rhonchi, No Wheezing  CVS:                         S1, S2,  +Murmur  ABD:                        Soft, nondistended, nontender, normoactive bowel sounds,   EXT:                         No edema, nontender, No Cyanosis mild Clubbing   NEURO:                  Alert, oriented, interactive, nonfocal, follows commands  PSYC:                      Calm, + Insight.    Assessment  1. COVID PNA  2. 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 h/o lung nodule  5. Ex smoker, underlying undiagnosed copd  6. h/o Colon cancer with mets to liver and lung    Plan  n/c o2 to maintain sat, taper fio2 as tolerated  re does decadron x 10 days, may help with inflammatory process  continue symbicort    incentive spirometry  check CTA chest to r/o PE, evaluate copd, ILD from covid  d/w Dr. Willis   Suspect pt will need to go home on home o2.

## 2021-01-18 NOTE — PROGRESS NOTE ADULT - ASSESSMENT
70 male with pmh colon adenocarcinoma, oligo-metastatic to liver (3/2014), and metastatic to lung (2016), s/p lung metastasectomy (2/2017), last chemo with capecitabine (8/2015), htn, hld, presents with sob, progressive BRYAN, fatigue, malaise, poor po intake admitted with Covid pneumonia with hypoxia, KARYNA and electrolyte abnormalities.    spO2 92% 4L NC    # Severe sepsis and acute hypoxic respiratory failure 2/2 Covid pneumonia   # Lactic acidosis; resolved  - Patient completed 5 days of remdesivir. Continue w/ extended course of remdesivir until can wean oxygen further.   - Decadron completed x 10 days  -CTA chest consistent with patchy opacities   -Pulmonology recs; Symbicort   - famotidine, supplemental vitamins   - Robitussin DM PRN  - Continue to encourage Incentive spirometry and Deep Breathing exercises   - encouraged self proning in bed as much as possible.  - blood cultures no growth - final read  - inflammatory markers flat  - pt already on aspirin 81mg, will have to consider extended course of VTE ppx upon hospital d/c    # KARYNA  # Hyponatremia  -Resolved  -monitor BMP    # Hypokalemia  -resolved  -monitor BMP    # Steroid induced leukocytosis  -WBC rising  - Monitor, afebrile, no evidence of uncontrolled secondary infectious process    # CAD  # HTN  chronic conditions  continue asa 81 daily  norvasc 5 daily, metoprolol 25 BID  restart valsartan 100mg and HCTZ 12.5 mg       # H/O Colon Adenocarcinoma with mets  in remission with no evidence of recurrent dz  follows heme/onc as outpatient, last visit 1/5/21    # [facility]  full code  - Contact is Александр [phone], updated on status and plan of care  primary care doctor: Dr. Figueroa 70 male with pmh colon adenocarcinoma, oligo-metastatic to liver (3/2014), and metastatic to lung (2016), s/p lung metastasectomy (2/2017), last chemo with capecitabine (8/2015), htn, hld, presents with sob, progressive BRYAN, fatigue, malaise, poor po intake admitted with Covid pneumonia with hypoxia, KARYNA and electrolyte abnormalities.    spO2 92% 4L NC    # Severe sepsis and acute hypoxic respiratory failure 2/2 Covid pneumonia   # Lactic acidosis; resolved  - Patient completed extended course of remdesivir.   - Decadron Day 12  -CTA chest consistent with patchy opacities   -Pulmonology recs; Symbicort   - famotidine, supplemental vitamins   - Robitussin DM PRN  - Continue to encourage Incentive spirometry and Deep Breathing exercises   - encouraged self proning in bed as much as possible.  - blood cultures no growth - final read  - inflammatory markers flat  - pt already on aspirin 81mg, will have to consider extended course of VTE ppx upon hospital d/c    # KARYNA  -Cr increased to 1.37  -Gentle IVF 1L NS   -Encourage PO intake  -monitor BMP     # Hyponatremia  -Resolved  -monitor BMP    # Hypokalemia  -resolved  -monitor BMP    # Steroid induced leukocytosis  -WBC rising  - Monitor, afebrile, no evidence of uncontrolled secondary infectious process  -monitor wbc     # CAD  # HTN  chronic conditions  continue asa 81 daily  norvasc 5 daily, metoprolol 25 BID  restart valsartan 100mg and HCTZ 12.5 mg       # H/O Colon Adenocarcinoma with mets  in remission with no evidence of recurrent dz  follows heme/onc as outpatient, last visit 1/5/21    # [facility]  full code  - Contact is Александр [phone], updated on status and plan of care  primary care doctor: Dr. Figueroa

## 2021-01-19 ENCOUNTER — TRANSCRIPTION ENCOUNTER (OUTPATIENT)
Age: 71
End: 2021-01-19

## 2021-01-19 VITALS — OXYGEN SATURATION: 93 %

## 2021-01-19 LAB
ALBUMIN SERPL ELPH-MCNC: 2.2 G/DL — LOW (ref 3.3–5)
ALP SERPL-CCNC: 90 U/L — SIGNIFICANT CHANGE UP (ref 40–120)
ALT FLD-CCNC: 41 U/L — SIGNIFICANT CHANGE UP (ref 10–45)
AST SERPL-CCNC: 29 U/L — SIGNIFICANT CHANGE UP (ref 10–40)
BILIRUB DIRECT SERPL-MCNC: 0.1 MG/DL — SIGNIFICANT CHANGE UP (ref 0–0.2)
BILIRUB INDIRECT FLD-MCNC: 0.4 MG/DL — SIGNIFICANT CHANGE UP (ref 0.2–1)
BILIRUB SERPL-MCNC: 0.5 MG/DL — SIGNIFICANT CHANGE UP (ref 0.2–1.2)
CREAT SERPL-MCNC: 0.96 MG/DL — SIGNIFICANT CHANGE UP (ref 0.5–1.3)
PROT SERPL-MCNC: 5.5 G/DL — LOW (ref 6–8.3)

## 2021-01-19 PROCEDURE — 99239 HOSP IP/OBS DSCHRG MGMT >30: CPT | Mod: CS

## 2021-01-19 RX ORDER — DEXAMETHASONE 0.5 MG/5ML
1 ELIXIR ORAL
Qty: 21 | Refills: 0
Start: 2021-01-19 | End: 2021-01-24

## 2021-01-19 RX ORDER — BUDESONIDE AND FORMOTEROL FUMARATE DIHYDRATE 160; 4.5 UG/1; UG/1
2 AEROSOL RESPIRATORY (INHALATION)
Qty: 0 | Refills: 0 | DISCHARGE
Start: 2021-01-19

## 2021-01-19 RX ORDER — RIVAROXABAN 15 MG-20MG
1 KIT ORAL
Qty: 30 | Refills: 0
Start: 2021-01-19 | End: 2021-02-17

## 2021-01-19 RX ADMIN — Medication 6 MILLIGRAM(S): at 04:47

## 2021-01-19 RX ADMIN — AMLODIPINE BESYLATE 5 MILLIGRAM(S): 2.5 TABLET ORAL at 04:47

## 2021-01-19 RX ADMIN — Medication 25 MILLIGRAM(S): at 04:48

## 2021-01-19 RX ADMIN — LOSARTAN POTASSIUM 100 MILLIGRAM(S): 100 TABLET, FILM COATED ORAL at 04:47

## 2021-01-19 RX ADMIN — BUDESONIDE AND FORMOTEROL FUMARATE DIHYDRATE 2 PUFF(S): 160; 4.5 AEROSOL RESPIRATORY (INHALATION) at 08:11

## 2021-01-19 RX ADMIN — Medication 12.5 MILLIGRAM(S): at 04:47

## 2021-01-19 NOTE — PROGRESS NOTE ADULT - SUBJECTIVE AND OBJECTIVE BOX
Follow-up Pulmonary Progress Note  Chief Complaint : Infection due to severe acute respiratory syndrome coronavirus 2 (SARS-CoV-2)        patient seen and examined  comfortable  on 4 L n/c sat 94-95% at rest      Allergies :No Known Allergies      PAST MEDICAL & SURGICAL HISTORY:  Lung metastasis    Cataract of left eye    Atherosclerosis    DJD (degenerative joint disease)    Lumbar stenosis    Snoring  DREW precautions -- responds affirmatively to STOP BANG questionnaire -- admits to loud snoring; age &gt; 50; h/o htn; gender, male; h/o htn    Clostridium difficile infection  on short term vancomyacin    Reflux    Depression  sts was depressed &quot;I lost my brother while going to this&quot;    Liver cancer  METS from the colon -- has received chemotherapy    Colon cancer  had surgery in March 2014 with mets to liver-- received chemotherapy    Arthropathy  left hip &gt; right hip    Carotid artery occlusion  left side in November 2013  Left CEA 11/2013    Osteoarthritis    Hyperlipidemia    CVA (cerebral vascular accident)  2005 -- wife and patient state no residual    Hypertension    H/O resection of liver  2015    Colon cancer  [facility] 2014 for chemotherapy due to colon cancer with METS to liver    Colon cancer  diagnosed in March 2014 with subsequent colon cancer followed with chemotherapy    H/O skin graft  1984    S/P carotid endarterectomy  11/2013  left side        Medications:  MEDICATIONS  (STANDING):  amLODIPine   Tablet 5 milliGRAM(s) Oral daily  ascorbic acid 500 milliGRAM(s) Oral daily  aspirin enteric coated 81 milliGRAM(s) Oral daily  budesonide  80 MICROgram(s)/formoterol 4.5 MICROgram(s) Inhaler 2 Puff(s) Inhalation two times a day  cholecalciferol 1000 Unit(s) Oral daily  dexAMETHasone     Tablet 6 milliGRAM(s) Oral daily  enoxaparin Injectable 40 milliGRAM(s) SubCutaneous daily  famotidine    Tablet 20 milliGRAM(s) Oral daily  hydrochlorothiazide 12.5 milliGRAM(s) Oral daily  influenza   Vaccine 0.5 milliLiter(s) IntraMuscular once  losartan 100 milliGRAM(s) Oral daily  metoprolol tartrate 25 milliGRAM(s) Oral two times a day  zinc sulfate 220 milliGRAM(s) Oral daily    MEDICATIONS  (PRN):  acetaminophen   Tablet .. 650 milliGRAM(s) Oral every 6 hours PRN Temp greater or equal to 38C (100.4F), Moderate Pain (4 - 6)  guaifenesin/dextromethorphan  Syrup 10 milliLiter(s) Oral every 6 hours PRN Cough      LABS:                        16.2   17.51 )-----------( 450      ( 18 Jan 2021 06:15 )             50.0     01-19    x   |  x   |  x   ----------------------------<  x   x    |  x   |  0.96    Ca    9.0      18 Jan 2021 06:15    TPro  5.5<L>  /  Alb  2.2<L>  /  TBili  0.5  /  DBili  0.1  /  AST  29  /  ALT  41  /  AlkPhos  90  01-19      COVID  01-07-21 @ 14:30  COVID -   Detected<!!>  10-03-20 @ 15:15  COVID -   NotDetec      COVID Biomarkers    01-18-21 @ 06:15 ESR --  ---  CRP 0.64<H>  ---  DDimer  <150   ---   <H>   ---   Ferritin 470<H>    01-13-21 @ 07:19 ESR --  ---  CRP 0.94<H>  ---  DDimer  262<H>   ---   <H>   ---   Ferritin 467<H>    01-07-21 @ 14:30 ESR --  ---  CRP --  ---  DDimer  172   ---   LDH --   ---   Ferritin --              CULTURES: (if applicable)    Culture - Urine (collected 01-08-21 @ 16:25)  Source: .Urine Clean Catch (Midstream)  Final Report (01-09-21 @ 17:06):    <10,000 CFU/mL Normal Urogenital Eden    Culture - Blood (collected 01-07-21 @ 14:35)  Source: .Blood Blood-Peripheral  Final Report (01-12-21 @ 19:00):    No Growth Final    Culture - Blood (collected 01-07-21 @ 14:30)  Source: .Blood Blood-Peripheral  Final Report (01-12-21 @ 19:00):    No Growth Final      < from: CT Angio Chest w/ IV Cont (01.16.21 @ 15:30) >  EXAM:  CT ANGIO CHEST (W)AW IC      PROCEDURE DATE:  01/16/2021        INTERPRETATION:  Clinical information: Hypoxia.  Covid infection. Evaluate for pulmonary embolus. Exam is compared to previous study of 6/23/2020.    CT angiogram of the chest was obtained following administration of intravenous contrast. Approximately 90 cc of Omnipaque 350 was administered and 10 cc was discarded. Coronal, sagittal and MIP images were submitted for review.    No hilar and/or mediastinal adenopathy is noted.    Heart is normal in size. No pericardial effusion is noted. Pulmonary arteries are normal in caliber. No filling defects are noted.    No endobronchial lesions are noted. Centrilobular emphysema is noted bilaterally. Patchy opacities are present within both lungs, more so in the peripheral aspect. No pleural effusions are noted.    Below the diaphragm, visualized portions of the abdomen are unremarkable.    Degenerative changes of the spine are noted.    Impression: No pulmonary embolus is noted.    Patchy opacities are noted within both lungs as described above. They're consistent with the patient's known history of Covid pneumonia.      < end of copied text >      VITALS:  T(C): 36.4 (01-19-21 @ 04:25), Max: 36.4 (01-18-21 @ 20:59)  T(F): 97.6 (01-19-21 @ 04:25), Max: 97.6 (01-18-21 @ 20:59)  HR: 72 (01-19-21 @ 08:13) (62 - 76)  BP: 145/66 (01-19-21 @ 04:25) (145/66 - 164/59)  BP(mean): --  ABP: --  ABP(mean): --  RR: 12 (01-19-21 @ 04:25) (12 - 18)  SpO2: 93% (01-19-21 @ 08:13) (93% - 98%)  CVP(mm Hg): --  CVP(cm H2O): --    Ins and Outs     01-18-21 @ 07:01  -  01-19-21 @ 07:00  --------------------------------------------------------  IN: 700 mL / OUT: 0 mL / NET: 700 mL                I&O's Detail    18 Jan 2021 07:01  -  19 Jan 2021 07:00  --------------------------------------------------------  IN:    sodium chloride 0.9%: 700 mL  Total IN: 700 mL    OUT:  Total OUT: 0 mL    Total NET: 700 mL

## 2021-01-19 NOTE — PROGRESS NOTE ADULT - ATTENDING COMMENTS
I have personally seen and examined patient on the above date.  I discussed the case with NP Juan and I agree with findings and plan as detailed per note above, which I have amended where appropriate.      69 y/o M colon adenocarcinoma, oligo-metastatic to liver (3/2014), and metastatic to lung (2016), s/p lung metastasectomy (2/2017), last chemo with capecitabine (8/2015), htn, hld admitted for acute hypoxic respiratory failure due to COVID-19 pneumonia and KARYNA, now improved s/p extended remdesivir and steroid course. Pt requiring 4L NC, O2 Sat 94-95% at rest. anticipate discharge home today with home O2, extended VTE ppx.

## 2021-01-19 NOTE — PROGRESS NOTE ADULT - ASSESSMENT
70 male with pmh colon adenocarcinoma, oligo-metastatic to liver (3/2014), and metastatic to lung (2016), s/p lung metastasectomy (2/2017), last chemo with capecitabine (8/2015), htn, hld, presents with sob, progressive BRYAN, fatigue, malaise, poor po intake admitted with Covid pneumonia with hypoxia, KARYNA and electrolyte abnormalities.    At rest: spO2 95% 4L NC   Ambulation: spO2 87% 4L NC    DC home today with O2    # Severe sepsis and acute hypoxic respiratory failure 2/2 Covid pneumonia   # Lactic acidosis; resolved  - Patient completed extended course of remdesivir.   - Decadron Day 13  -CTA chest consistent with patchy opacities   -Pulmonology recs; Symbicort   - famotidine, supplemental vitamins   - Robitussin DM PRN  - Continue to encourage Incentive spirometry and Deep Breathing exercises   - encouraged self proning in bed as much as possible.  - blood cultures no growth - final read  - inflammatory markers flat  - pt already on aspirin 81mg, will have to consider extended course of VTE ppx upon hospital d/c    # KARYNA  -resolved  -Encourage PO intake  -monitor BMP     # Hyponatremia  -Resolved  -monitor BMP    # Hypokalemia  -resolved  -monitor BMP    # Steroid induced leukocytosis  - Monitor, afebrile, no evidence of uncontrolled secondary infectious process  -monitor wbc     # CAD  # HTN  chronic conditions  continue asa 81 daily  norvasc 5 daily, metoprolol 25 BID  Valsartan 100mg and HCTZ 12.5 mg       # H/O Colon Adenocarcinoma with mets  in remission with no evidence of recurrent dz  follows heme/onc as outpatient, last visit 1/5/21    # [facility]  full code  - Contact is Александр [phone], updated on status and plan of care  primary care doctor: Dr. Figueroa

## 2021-01-19 NOTE — DISCHARGE NOTE NURSING/CASE MANAGEMENT/SOCIAL WORK - PATIENT PORTAL LINK FT
You can access the FollowMyHealth Patient Portal offered by [facility] by registering at the following website: [email or web address]. By joining [facility]’s FollowMyHealth portal, you will also be able to view your health information using other applications (apps) compatible with our system.

## 2021-01-19 NOTE — PROGRESS NOTE ADULT - SUBJECTIVE AND OBJECTIVE BOX
Patient is a 70y old  Male who presents with a chief complaint of Acute respiratory failure due to COVID      Patient seen and examined at bedside. Pt states he feels well, denies overnight events or current complaints including chest pain, shortness of breath, dizziness, nausea, vomiting, diarrhea, fever or chills.    ALLERGIES:  No Known Allergies    MEDICATIONS  (STANDING):  amLODIPine   Tablet 5 milliGRAM(s) Oral daily  ascorbic acid 500 milliGRAM(s) Oral daily  aspirin enteric coated 81 milliGRAM(s) Oral daily  budesonide  80 MICROgram(s)/formoterol 4.5 MICROgram(s) Inhaler 2 Puff(s) Inhalation two times a day  cholecalciferol 1000 Unit(s) Oral daily  dexAMETHasone     Tablet 6 milliGRAM(s) Oral daily  enoxaparin Injectable 40 milliGRAM(s) SubCutaneous daily  famotidine    Tablet 20 milliGRAM(s) Oral daily  hydrochlorothiazide 12.5 milliGRAM(s) Oral daily  influenza   Vaccine 0.5 milliLiter(s) IntraMuscular once  losartan 100 milliGRAM(s) Oral daily  metoprolol tartrate 25 milliGRAM(s) Oral two times a day  sodium chloride 0.9%. 1000 milliLiter(s) (100 mL/Hr) IV Continuous <Continuous>  zinc sulfate 220 milliGRAM(s) Oral daily    MEDICATIONS  (PRN):  acetaminophen   Tablet .. 650 milliGRAM(s) Oral every 6 hours PRN Temp greater or equal to 38C (100.4F), Moderate Pain (4 - 6)  guaifenesin/dextromethorphan  Syrup 10 milliLiter(s) Oral every 6 hours PRN Cough    Vital Signs Last 24 Hrs  T(F): 97.6 (19 Jan 2021 04:25), Max: 97.6 (18 Jan 2021 20:59)  HR: 72 (19 Jan 2021 08:13) (62 - 76)  BP: 145/66 (19 Jan 2021 04:25) (145/66 - 164/59)  RR: 12 (19 Jan 2021 04:25) (12 - 18)  SpO2: 93% (19 Jan 2021 08:13) (93% - 98%)  I&O's Summary    18 Jan 2021 07:01  -  19 Jan 2021 07:00  --------------------------------------------------------  IN: 700 mL / OUT: 0 mL / NET: 700 mL      PHYSICAL EXAM:  General: NAD, A/O x 3  ENT: MMM, no oral thrush   Neck: Supple, No JVD  Lungs: Decreased bilaterally, non labored breathing  Cardio: RRR, S1/S2, No murmurs  Abdomen: Soft, Nontender, Nondistended; Bowel sounds present  Extremities: No calf tenderness, No pitting edema    LABS:                        16.2   17.51 )-----------( 450      ( 18 Jan 2021 06:15 )             50.0     01-19    x   |  x   |  x   ----------------------------<  x   x    |  x   |  0.96    Ca    9.0      18 Jan 2021 06:15    TPro  5.5  /  Alb  2.2  /  TBili  0.5  /  DBili  0.1  /  AST  29  /  ALT  41  /  AlkPhos  90  01-19    eGFR if Non African American: 80 mL/min/1.73M2 (01-19-21 @ 06:45)  eGFR if : 92 mL/min/1.73M2 (01-19-21 @ 06:45)                    RADIOLOGY & ADDITIONAL TESTS:    Care Discussed with Consultants/Other Providers:

## 2021-01-19 NOTE — PROGRESS NOTE ADULT - PROVIDER SPECIALTY LIST ADULT
Hospitalist
Critical Care
Hospitalist
Pulmonology
Hospitalist
Hospitalist
Pulmonology
Hospitalist

## 2021-01-19 NOTE — PROGRESS NOTE ADULT - REASON FOR ADMISSION
Acute respiratory failure due to COVID

## 2021-01-19 NOTE — PROGRESS NOTE ADULT - ASSESSMENT
Physical Examination:  GENERAL:               Alert, Oriented, No acute distress.    HEENT:                    No JVD, Moist MM  PULM:                     Bilateral air entry, diminishedto auscultation bilaterally, no significant sputum production, No Rales, No Rhonchi, No Wheezing  CVS:                         S1, S2,  +Murmur  ABD:                        Soft, nondistended, nontender, normoactive bowel sounds,   EXT:                         No edema, nontender, No Cyanosis mild Clubbing   NEURO:                  Alert, oriented, interactive, nonfocal, follows commands  PSYC:                      Calm, + Insight.    Assessment  1. COVID PNA  2. 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 h/o lung nodule  5. Ex smoker, underlying undiagnosed copd  6. h/o Colon cancer with mets to liver and lung    Plan  n/c o2 to maintain sat, taper fio2 as tolerated  Decadron for second course   continue symbicort    incentive spirometry  check CTA chest to r/o PE, evaluate copd, ILD from covid  d/w Dr. Mckeon  Suspect pt will need to go home on home o2.   dispo planning as per primary team.

## 2021-01-20 ENCOUNTER — NON-APPOINTMENT (OUTPATIENT)
Age: 71
End: 2021-01-20

## 2021-01-20 RX ORDER — BUDESONIDE AND FORMOTEROL FUMARATE DIHYDRATE 160; 4.5 UG/1; UG/1
2 AEROSOL RESPIRATORY (INHALATION)
Qty: 120 | Refills: 0
Start: 2021-01-20 | End: 2021-02-18

## 2021-01-22 ENCOUNTER — APPOINTMENT (OUTPATIENT)
Dept: FAMILY MEDICINE | Facility: CLINIC | Age: 71
End: 2021-01-22
Payer: MEDICARE

## 2021-01-22 DIAGNOSIS — J18.9 PNEUMONIA, UNSPECIFIED ORGANISM: ICD-10-CM

## 2021-01-22 PROCEDURE — 99496 TRANSJ CARE MGMT HIGH F2F 7D: CPT | Mod: CS,95

## 2021-01-22 RX ORDER — BUDESONIDE AND FORMOTEROL FUMARATE DIHYDRATE 80; 4.5 UG/1; UG/1
80-4.5 AEROSOL RESPIRATORY (INHALATION) TWICE DAILY
Qty: 1 | Refills: 2 | Status: ACTIVE | COMMUNITY
Start: 2021-01-22 | End: 1900-01-01

## 2021-01-22 NOTE — HISTORY OF PRESENT ILLNESS
[Home] : at home, [unfilled] , at the time of the visit. [Medical Office: ([facility])___] : at the medical office located in  [Spouse] : spouse [Family Member] : family member [Verbal consent obtained from patient] : the patient, [unfilled] [Post-hospitalization from ___ Hospital] : Post-hospitalization from [unfilled] Hospital [Admitted on: ___] : The patient was admitted on [unfilled] [Discharged on ___] : discharged on [unfilled] [Discharge Summary] : discharge summary [Pertinent Labs] : pertinent labs [Radiology Findings] : radiology findings [Discharge Med List] : discharge medication list [Other: ____] : [unfilled] [Med Reconciliation] : medication reconciliation has been completed [Patient Contacted By: ____] : and contacted by [unfilled] [FreeTextEntry2] : Patient is a 70-year-old gentleman who presents today for televideo visit status post hospitalization.  During the visit both his wife and son were present patient was admitted to [facility] on 1/7/2021 and discharged on 1/19/2021.  Patient had acute respiratory failure secondary to COVID-19 pneumonia.  Medical history is significant for ischemic heart disease, hypertension, carotid stenosis status post endarterectomy, cerebral infarction, hyperlipidemia and history of colon cancer metastatic to liver and also had lung mets which were excised patient did undergo chemotherapy.\par \par I reviewed the entire hospitalization patient went into acute renal failure secondary to dehydration he completed a full extended course of remdesivir and a prolonged Decadron course presently tapering doses.  Our goal is to maintain his oxygen saturation above 92% patient does have oxygen at home and needs Symbicort.

## 2021-01-22 NOTE — HEALTH RISK ASSESSMENT
[] : No [Yes] : Yes [4 or more  times a week (4 pts)] : 4 or more  times a week (4 points) [1 or 2 (0 pts)] : 1 or 2 (0 points) [Never (0 pts)] : Never (0 points) [No] : In the past 12 months have you used drugs other than those required for medical reasons? No [No falls in past year] : Patient reported no falls in the past year [0] : 2) Feeling down, depressed, or hopeless: Not at all (0) [Audit-CScore] : 4 [[id number]] : 0 [Change in mental status noted] : No change in mental status noted [Language] : denies difficulty with language [Behavior] : denies difficulty with behavior [Learning/Retaining New Information] : denies difficulty learning/retaining new information [Handling Complex Tasks] : denies difficulty handling complex tasks [Reasoning] : denies difficulty with reasoning [Spatial Ability and Orientation] : denies difficulty with spatial ability and orientation [None] : None [With Significant Other] : lives with significant other [Less Than High School] : less than high school [] :  [Fully functional (bathing, dressing, toileting, transferring, walking, feeding)] : Fully functional (bathing, dressing, toileting, transferring, walking, feeding) [Fully functional (using the telephone, shopping, preparing meals, housekeeping, doing laundry, using] : Fully functional and needs no help or supervision to perform IADLs (using the telephone, shopping, preparing meals, housekeeping, doing laundry, using transportation, managing medications and managing finances) [Reports changes in hearing] : Reports no changes in hearing [Reports changes in vision] : Reports no changes in vision [Reports normal functional visual acuity (ie: able to read med bottle)] : Reports normal functional visual acuity [Reports changes in dental health] : Reports no changes in dental health [Smoke Detector] : smoke detector [Carbon Monoxide Detector] : carbon monoxide detector [Safety elements used in home] : safety elements used in home [Seat Belt] :  uses seat belt [Sunscreen] : uses sunscreen [Travel to Developing Areas] : does not  travel to developing areas [TB Exposure] : is not being exposed to tuberculosis [Caregiver Concerns] : does not have caregiver concerns

## 2021-01-22 NOTE — REVIEW OF SYSTEMS
[Fever] : no fever [Chills] : no chills [Fatigue] : fatigue [Hot Flashes] : no hot flashes [Night Sweats] : no night sweats [Recent Change In Weight] : ~T no recent weight change [Shortness Of Breath] : no shortness of breath [Wheezing] : no wheezing [Cough] : cough [Dyspnea on Exertion] : dyspnea on exertion [Negative] : Heme/Lymph

## 2021-01-22 NOTE — ASSESSMENT
[FreeTextEntry1] : Assessment and plan:\par \par 1.  Status post COVID-19 pneumonia patient will continue present medical management he will go to completion with Decadron, oxygen 4 L nasal cannula to maintain O2 saturation above 92% and for a total period of quarantine 21 days which was recommended prior to discharge in hospital.  Patient will also continue Xarelto 10 mg p.o. daily for a total of 30 days.\par \par 2.  Patient does have underlying chronic obstructive pulmonary disease history of lung mets I will prescribe Symbicort and I did discuss with the family and patient he needs to rinse his mouth after each use.\par \par 3.  Hypertension continue amlodipine 5 mg p.o. daily twice a day and valsartan hydrochlorothiazide one 60–25.\par \par 4.  Ischemic heart disease continue aspirin 81 mg and metoprolol tartrate 25 mg twice a day.

## 2021-01-25 ENCOUNTER — LABORATORY RESULT (OUTPATIENT)
Age: 71
End: 2021-01-25

## 2021-01-25 RX ORDER — FLUTICASONE FUROATE AND VILANTEROL TRIFENATATE 100; 25 UG/1; UG/1
100-25 POWDER RESPIRATORY (INHALATION)
Qty: 1 | Refills: 3 | Status: ACTIVE | COMMUNITY
Start: 2021-01-25 | End: 1900-01-01

## 2021-01-27 LAB
ALBUMIN SERPL ELPH-MCNC: 3 G/DL
ALP BLD-CCNC: 118 U/L
ALT SERPL-CCNC: 148 U/L
ANION GAP SERPL CALC-SCNC: 13 MMOL/L
AST SERPL-CCNC: 78 U/L
BASOPHILS # BLD AUTO: 0.02 K/UL
BASOPHILS NFR BLD AUTO: 0.1 %
BILIRUB SERPL-MCNC: 0.5 MG/DL
BUN SERPL-MCNC: 36 MG/DL
CALCIUM SERPL-MCNC: 8.7 MG/DL
CHLORIDE SERPL-SCNC: 99 MMOL/L
CHOLEST SERPL-MCNC: 160 MG/DL
CO2 SERPL-SCNC: 24 MMOL/L
CREAT SERPL-MCNC: 1.09 MG/DL
EOSINOPHIL # BLD AUTO: 0.02 K/UL
EOSINOPHIL NFR BLD AUTO: 0.1 %
ESTIMATED AVERAGE GLUCOSE: 148 MG/DL
GLUCOSE SERPL-MCNC: 173 MG/DL
HBA1C MFR BLD HPLC: 6.8 %
HCT VFR BLD CALC: 40.5 %
HDLC SERPL-MCNC: 48 MG/DL
HGB BLD-MCNC: 13.3 G/DL
IMM GRANULOCYTES NFR BLD AUTO: 0.6 %
LDLC SERPL CALC-MCNC: 41 MG/DL
LYMPHOCYTES # BLD AUTO: 1.23 K/UL
LYMPHOCYTES NFR BLD AUTO: 8.6 %
MAN DIFF?: NORMAL
MCHC RBC-ENTMCNC: 31.6 PG
MCHC RBC-ENTMCNC: 32.8 GM/DL
MCV RBC AUTO: 96.2 FL
MONOCYTES # BLD AUTO: 1.73 K/UL
MONOCYTES NFR BLD AUTO: 12.1 %
NEUTROPHILS # BLD AUTO: 11.21 K/UL
NEUTROPHILS NFR BLD AUTO: 78.5 %
NONHDLC SERPL-MCNC: 113 MG/DL
PLATELET # BLD AUTO: 114 K/UL
POTASSIUM SERPL-SCNC: 3.2 MMOL/L
PROT SERPL-MCNC: 4.9 G/DL
RBC # BLD: 4.21 M/UL
RBC # FLD: 13.4 %
SODIUM SERPL-SCNC: 136 MMOL/L
TRIGL SERPL-MCNC: 360 MG/DL
TSH SERPL-ACNC: 1.79 UIU/ML
WBC # FLD AUTO: 14.29 K/UL

## 2021-01-28 DIAGNOSIS — D72.829 ELEVATED WHITE BLOOD CELL COUNT, UNSPECIFIED: ICD-10-CM

## 2021-01-28 DIAGNOSIS — E87.6 HYPOKALEMIA: ICD-10-CM

## 2021-02-02 ENCOUNTER — LABORATORY RESULT (OUTPATIENT)
Age: 71
End: 2021-02-02

## 2021-02-03 LAB
ALBUMIN SERPL ELPH-MCNC: 3 G/DL
ALP BLD-CCNC: 99 U/L
ALT SERPL-CCNC: 50 U/L
ANION GAP SERPL CALC-SCNC: 11 MMOL/L
AST SERPL-CCNC: 27 U/L
BASOPHILS # BLD AUTO: 0.01 K/UL
BASOPHILS NFR BLD AUTO: 0.1 %
BILIRUB SERPL-MCNC: 0.2 MG/DL
BUN SERPL-MCNC: 24 MG/DL
CALCIUM SERPL-MCNC: 8.2 MG/DL
CHLORIDE SERPL-SCNC: 103 MMOL/L
CO2 SERPL-SCNC: 22 MMOL/L
CREAT SERPL-MCNC: 1.11 MG/DL
EOSINOPHIL # BLD AUTO: 0.12 K/UL
EOSINOPHIL NFR BLD AUTO: 1.5 %
GLUCOSE SERPL-MCNC: 129 MG/DL
HCT VFR BLD CALC: 33.3 %
HGB BLD-MCNC: 10.7 G/DL
IMM GRANULOCYTES NFR BLD AUTO: 0.8 %
LYMPHOCYTES # BLD AUTO: 1.56 K/UL
LYMPHOCYTES NFR BLD AUTO: 19.6 %
MAN DIFF?: NORMAL
MCHC RBC-ENTMCNC: 31.2 PG
MCHC RBC-ENTMCNC: 32.1 GM/DL
MCV RBC AUTO: 97.1 FL
MONOCYTES # BLD AUTO: 0.99 K/UL
MONOCYTES NFR BLD AUTO: 12.4 %
NEUTROPHILS # BLD AUTO: 5.22 K/UL
NEUTROPHILS NFR BLD AUTO: 65.6 %
PLATELET # BLD AUTO: 95 K/UL
POTASSIUM SERPL-SCNC: 3.7 MMOL/L
PROT SERPL-MCNC: 5.1 G/DL
RBC # BLD: 3.43 M/UL
RBC # FLD: 13.5 %
SODIUM SERPL-SCNC: 137 MMOL/L
WBC # FLD AUTO: 7.96 K/UL

## 2021-02-09 ENCOUNTER — APPOINTMENT (OUTPATIENT)
Dept: FAMILY MEDICINE | Facility: CLINIC | Age: 71
End: 2021-02-09
Payer: MEDICARE

## 2021-02-09 VITALS
TEMPERATURE: 97.1 F | DIASTOLIC BLOOD PRESSURE: 56 MMHG | OXYGEN SATURATION: 94 % | HEART RATE: 83 BPM | SYSTOLIC BLOOD PRESSURE: 122 MMHG | RESPIRATION RATE: 16 BRPM | BODY MASS INDEX: 21.34 KG/M2 | HEIGHT: 64 IN | WEIGHT: 125 LBS

## 2021-02-09 PROCEDURE — 99214 OFFICE O/P EST MOD 30 MIN: CPT | Mod: CS

## 2021-02-09 RX ORDER — AMLODIPINE BESYLATE 5 MG/1
5 TABLET ORAL
Qty: 180 | Refills: 0 | Status: COMPLETED | COMMUNITY
Start: 2017-02-06 | End: 2021-02-09

## 2021-02-09 NOTE — HISTORY OF PRESENT ILLNESS
[Spouse] : spouse [FreeTextEntry1] : Please see HPI [de-identified] : Patient is a 70-year-old gentleman who presents today for follow-up and disease management patient recently was hospitalized for COVID-19 infection and bilateral pneumonia.  Presently the patient is awake alert and oriented x3 in no acute distress accompanied by his spouse medical history is significant for COVID-19 infection, ischemic heart disease, COPD, hypertension, colon CA mets to liver and lung, history of CVA.  I recently obtained comprehensive blood work which shows that post virus patient is anemic we will not be obtaining blood work at today's visit but in 6 weeks patient will be following up and we will obtain comprehensive blood work.

## 2021-02-09 NOTE — ASSESSMENT
[FreeTextEntry1] : Assessment and plan:\par \par 1.  Status post COVID-19 infection with pneumonia patient is slowly recovering presently without oxygen his O2 saturation is 96% I recommend he continue O2 checks his pulse ox frequently if oxygen drops below 92% he needs to continue O2 therapy.\par \par 2.  Ischemic heart disease patient is chest pain-free continue beta-blocker and aspirin patient is on anticoagulation with Xarelto which will be continued for a full 30 days patient has 2 weeks left.  That is to avoid DVT post COVID-19.\par \par 3.  Chronic obstructive pulmonary disease presently well controlled continue Symbicort and Breo Ellipta.\par \par 4.  Hypertension excellent blood pressure control at this point we needed to stop amlodipine because the patient had become hypotensive.\par \par 5.  History of colon CA status post resection and chemo patient is followed closely by hematology oncology.\par \par 6.  CVA no residuals continue aspirin.\par \par 7.  Anemia post viral infection recommend supplements such as multivitamins follow-up in 6 weeks at that time I will obtain comprehensive blood work.\par \par 8.  Patient post hospitalization had leukocytosis which corrected and elevated hemoglobin A1c secondary to steroid use.  As stated earlier I will obtain blood work in 6 weeks.

## 2021-02-09 NOTE — HEALTH RISK ASSESSMENT
[] : No [Yes] : Yes [4 or more  times a week (4 pts)] : 4 or more  times a week (4 points) [1 or 2 (0 pts)] : 1 or 2 (0 points) [Never (0 pts)] : Never (0 points) [No] : In the past 12 months have you used drugs other than those required for medical reasons? No [No falls in past year] : Patient reported no falls in the past year [0] : 2) Feeling down, depressed, or hopeless: Not at all (0) [de-identified] : Presently patient has stopped consumption of alcohol while on medications. [Audit-CScore] : 4 [[id number]] : 0

## 2021-02-09 NOTE — REVIEW OF SYSTEMS
[Fever] : no fever [Fatigue] : fatigue [Chills] : no chills [Hot Flashes] : no hot flashes [Night Sweats] : no night sweats [Shortness Of Breath] : no shortness of breath [Recent Change In Weight] : ~T no recent weight change [Wheezing] : no wheezing [Cough] : no cough [Dyspnea on Exertion] : dyspnea on exertion [Negative] : Heme/Lymph

## 2021-02-11 DIAGNOSIS — R74.8 ABNORMAL LEVELS OF OTHER SERUM ENZYMES: ICD-10-CM

## 2021-02-15 NOTE — ED ADULT NURSE NOTE - NS ED NURSE LEVEL OF CONSCIOUSNESS AFFECT
Patient informed of available non-opioid medications and other non-pharmacological options. Discussed advantages and disadvantages of the alternatives, including whether the patient is at-risk for, or has a history of, controlled substance abuse or misuse, and the patient’s personal preferences. [facility] “Opioid Alternative Pamphlet” was provided to patient. Appropriate

## 2021-02-22 ENCOUNTER — LABORATORY RESULT (OUTPATIENT)
Age: 71
End: 2021-02-22

## 2021-02-22 LAB
ALBUMIN SERPL ELPH-MCNC: 4 G/DL
ALP BLD-CCNC: 100 U/L
ALT SERPL-CCNC: 20 U/L
ANION GAP SERPL CALC-SCNC: 12 MMOL/L
AST SERPL-CCNC: 23 U/L
BILIRUB SERPL-MCNC: 0.6 MG/DL
BUN SERPL-MCNC: 15 MG/DL
CALCIUM SERPL-MCNC: 9.8 MG/DL
CHLORIDE SERPL-SCNC: 103 MMOL/L
CO2 SERPL-SCNC: 26 MMOL/L
CREAT SERPL-MCNC: 1.01 MG/DL
GLUCOSE SERPL-MCNC: 106 MG/DL
POTASSIUM SERPL-SCNC: 4.5 MMOL/L
PROT SERPL-MCNC: 6.6 G/DL
SODIUM SERPL-SCNC: 142 MMOL/L

## 2021-02-24 LAB
BASOPHILS # BLD AUTO: 0 K/UL
BASOPHILS NFR BLD AUTO: 0 %
EOSINOPHIL # BLD AUTO: 0 K/UL
EOSINOPHIL NFR BLD AUTO: 0 %
HCT VFR BLD CALC: 37.8 %
HGB BLD-MCNC: 12.5 G/DL
LYMPHOCYTES # BLD AUTO: 2.12 K/UL
LYMPHOCYTES NFR BLD AUTO: 16.5 %
MAN DIFF?: NORMAL
MCHC RBC-ENTMCNC: 31.6 PG
MCHC RBC-ENTMCNC: 33.1 GM/DL
MCV RBC AUTO: 95.7 FL
MONOCYTES # BLD AUTO: 1 K/UL
MONOCYTES NFR BLD AUTO: 7.8 %
NEUTROPHILS # BLD AUTO: 9.59 K/UL
NEUTROPHILS NFR BLD AUTO: 74.8 %
PLATELET # BLD AUTO: 200 K/UL
RBC # BLD: 3.95 M/UL
RBC # FLD: 14.1 %
WBC # FLD AUTO: 12.82 K/UL

## 2021-03-12 ENCOUNTER — APPOINTMENT (OUTPATIENT)
Dept: FAMILY MEDICINE | Facility: CLINIC | Age: 71
End: 2021-03-12
Payer: MEDICARE

## 2021-03-12 VITALS
SYSTOLIC BLOOD PRESSURE: 130 MMHG | HEART RATE: 76 BPM | DIASTOLIC BLOOD PRESSURE: 66 MMHG | BODY MASS INDEX: 22.74 KG/M2 | TEMPERATURE: 97.5 F | WEIGHT: 133.19 LBS | OXYGEN SATURATION: 95 % | RESPIRATION RATE: 16 BRPM | HEIGHT: 64 IN

## 2021-03-12 DIAGNOSIS — I70.90 UNSPECIFIED ATHEROSCLEROSIS: ICD-10-CM

## 2021-03-12 DIAGNOSIS — E78.5 HYPERLIPIDEMIA, UNSPECIFIED: ICD-10-CM

## 2021-03-12 PROCEDURE — 36415 COLL VENOUS BLD VENIPUNCTURE: CPT

## 2021-03-12 PROCEDURE — 99214 OFFICE O/P EST MOD 30 MIN: CPT | Mod: CS,25

## 2021-03-12 RX ORDER — TADALAFIL 20 MG/1
20 TABLET ORAL
Qty: 6 | Refills: 3 | Status: ACTIVE | COMMUNITY
Start: 2021-03-12 | End: 1900-01-01

## 2021-03-12 RX ORDER — RIVAROXABAN 10 MG/1
10 TABLET, FILM COATED ORAL
Refills: 0 | Status: COMPLETED | COMMUNITY
Start: 2021-01-26 | End: 2021-03-12

## 2021-03-12 NOTE — ASSESSMENT
[FreeTextEntry1] : Assessment and plan:\par \par 1.  Status post COVID-19 infection patient has totally recovered oxygen saturation is excellent exercise capacity has increased.  There is no medical contraindication for the patient to return to work he could return to work without restrictions.\par \par 2.  Atherosclerosis continue aspirin 81 mg p.o. daily.\par \par 3.  Hypertension patient's blood pressure is under excellent control I recommend continuing present medical management with metoprolol 25 mg p.o. twice a day and valsartan hydrochlorothiazide 160-25 mg p.o. daily patient is tolerating medications and doing well.\par \par 4.  Chronic obstructive pulmonary disease presently well controlled continue present inhalers as needed patient states that he basically has not been taking the Symbicort because he is feeling well.\par \par 5.  History of colon cancer metastatic to liver and also to lungs patient is doing well he is followed by hematology oncology status post colon resection and presently as stated earlier close observation by oncology.\par \par 6.  Patient has been complaining of erectile dysfunction especially post Covid we will attempt Cialis 20 mg.

## 2021-03-12 NOTE — HEALTH RISK ASSESSMENT
[Yes] : Yes [4 or more  times a week (4 pts)] : 4 or more  times a week (4 points) [1 or 2 (0 pts)] : 1 or 2 (0 points) [Never (0 pts)] : Never (0 points) [No] : In the past 12 months have you used drugs other than those required for medical reasons? No [No falls in past year] : Patient reported no falls in the past year [0] : 2) Feeling down, depressed, or hopeless: Not at all (0) [] : No [Audit-CScore] : 4 [[id number]] : 0

## 2021-03-12 NOTE — REVIEW OF SYSTEMS
[Negative] : Heme/Lymph [Fever] : no fever [Chills] : no chills [Fatigue] : no fatigue [Hot Flashes] : no hot flashes [Night Sweats] : no night sweats [Recent Change In Weight] : ~T no recent weight change [Shortness Of Breath] : no shortness of breath [Wheezing] : no wheezing [Cough] : no cough [Dyspnea on Exertion] : not dyspnea on exertion

## 2021-03-12 NOTE — HISTORY OF PRESENT ILLNESS
[FreeTextEntry1] : See HPI. [de-identified] : Patient is a 71-year-old gentleman who presents today for follow-up and disease management patient is status post COVID-19 infection status post pneumonia in fact patient required oxygen presently is off oxygen replacement therapy and his O2 saturations are always above 95% therefore the patient is recovered from COVID-19 infection and may return to work without restrictions.\par \par Presently the patient is awake alert and oriented x3 we will be addressing atherosclerosis, chronic obstructive pulmonary disease, hypertension, history of colon CA metastatic to both liver and lungs patient doing well and has responded to treatment status post colon resection and is followed closely by oncology patient also has history of CVA with no residuals and anemia.\par \par At this visit I will be obtaining comprehensive blood work.

## 2021-03-14 LAB
24R-OH-CALCIDIOL SERPL-MCNC: 74.8 PG/ML
25(OH)D3 SERPL-MCNC: 60.9 NG/ML
ALBUMIN SERPL ELPH-MCNC: 4.1 G/DL
ALP BLD-CCNC: 91 U/L
ALT SERPL-CCNC: 19 U/L
ANION GAP SERPL CALC-SCNC: 10 MMOL/L
AST SERPL-CCNC: 23 U/L
BASOPHILS # BLD AUTO: 0.02 K/UL
BASOPHILS NFR BLD AUTO: 0.2 %
BILIRUB SERPL-MCNC: 0.7 MG/DL
BUN SERPL-MCNC: 18 MG/DL
CALCIUM SERPL-MCNC: 9.6 MG/DL
CHLORIDE SERPL-SCNC: 103 MMOL/L
CHOLEST SERPL-MCNC: 160 MG/DL
CO2 SERPL-SCNC: 28 MMOL/L
CREAT SERPL-MCNC: 0.9 MG/DL
EOSINOPHIL # BLD AUTO: 0.2 K/UL
EOSINOPHIL NFR BLD AUTO: 2 %
FERRITIN SERPL-MCNC: 197 NG/ML
FOLATE SERPL-MCNC: >20 NG/ML
GLUCOSE SERPL-MCNC: 110 MG/DL
HCT VFR BLD CALC: 39.6 %
HDLC SERPL-MCNC: 52 MG/DL
HGB BLD-MCNC: 12.4 G/DL
IMM GRANULOCYTES NFR BLD AUTO: 0.6 %
IRON SATN MFR SERPL: 39 %
IRON SERPL-MCNC: 118 UG/DL
LDLC SERPL CALC-MCNC: 83 MG/DL
LYMPHOCYTES # BLD AUTO: 3.19 K/UL
LYMPHOCYTES NFR BLD AUTO: 31.8 %
MAN DIFF?: NORMAL
MCHC RBC-ENTMCNC: 31 PG
MCHC RBC-ENTMCNC: 31.3 GM/DL
MCV RBC AUTO: 99 FL
MONOCYTES # BLD AUTO: 0.95 K/UL
MONOCYTES NFR BLD AUTO: 9.5 %
NEUTROPHILS # BLD AUTO: 5.61 K/UL
NEUTROPHILS NFR BLD AUTO: 55.9 %
NONHDLC SERPL-MCNC: 107 MG/DL
PLATELET # BLD AUTO: 259 K/UL
POTASSIUM SERPL-SCNC: 4.1 MMOL/L
PROT SERPL-MCNC: 6.7 G/DL
RBC # BLD: 4 M/UL
RBC # FLD: 15.9 %
SODIUM SERPL-SCNC: 141 MMOL/L
TIBC SERPL-MCNC: 306 UG/DL
TRIGL SERPL-MCNC: 120 MG/DL
UIBC SERPL-MCNC: 187 UG/DL
VIT B12 SERPL-MCNC: 932 PG/ML
WBC # FLD AUTO: 10.03 K/UL

## 2021-03-29 ENCOUNTER — APPOINTMENT (OUTPATIENT)
Dept: FAMILY MEDICINE | Facility: CLINIC | Age: 71
End: 2021-03-29

## 2021-03-30 ENCOUNTER — APPOINTMENT (OUTPATIENT)
Dept: FAMILY MEDICINE | Facility: CLINIC | Age: 71
End: 2021-03-30

## 2021-05-16 ENCOUNTER — RX RENEWAL (OUTPATIENT)
Age: 71
End: 2021-05-16

## 2021-05-16 RX ORDER — VALSARTAN AND HYDROCHLOROTHIAZIDE 160; 25 MG/1; MG/1
160-25 TABLET, FILM COATED ORAL
Qty: 90 | Refills: 3 | Status: ACTIVE | COMMUNITY
Start: 2019-03-20 | End: 1900-01-01

## 2021-06-03 PROCEDURE — 83735 ASSAY OF MAGNESIUM: CPT

## 2021-06-03 PROCEDURE — 81001 URINALYSIS AUTO W/SCOPE: CPT

## 2021-06-03 PROCEDURE — 80076 HEPATIC FUNCTION PANEL: CPT

## 2021-06-03 PROCEDURE — 86140 C-REACTIVE PROTEIN: CPT

## 2021-06-03 PROCEDURE — 83880 ASSAY OF NATRIURETIC PEPTIDE: CPT

## 2021-06-03 PROCEDURE — 87040 BLOOD CULTURE FOR BACTERIA: CPT

## 2021-06-03 PROCEDURE — 71045 X-RAY EXAM CHEST 1 VIEW: CPT

## 2021-06-03 PROCEDURE — 85610 PROTHROMBIN TIME: CPT

## 2021-06-03 PROCEDURE — 82962 GLUCOSE BLOOD TEST: CPT

## 2021-06-03 PROCEDURE — 87086 URINE CULTURE/COLONY COUNT: CPT

## 2021-06-03 PROCEDURE — 94640 AIRWAY INHALATION TREATMENT: CPT

## 2021-06-03 PROCEDURE — 85025 COMPLETE CBC W/AUTO DIFF WBC: CPT

## 2021-06-03 PROCEDURE — 83615 LACTATE (LD) (LDH) ENZYME: CPT

## 2021-06-03 PROCEDURE — 82728 ASSAY OF FERRITIN: CPT

## 2021-06-03 PROCEDURE — 86803 HEPATITIS C AB TEST: CPT

## 2021-06-03 PROCEDURE — 80048 BASIC METABOLIC PNL TOTAL CA: CPT

## 2021-06-03 PROCEDURE — 82550 ASSAY OF CK (CPK): CPT

## 2021-06-03 PROCEDURE — 36415 COLL VENOUS BLD VENIPUNCTURE: CPT

## 2021-06-03 PROCEDURE — 86769 SARS-COV-2 COVID-19 ANTIBODY: CPT

## 2021-06-03 PROCEDURE — U0005: CPT

## 2021-06-03 PROCEDURE — 82565 ASSAY OF CREATININE: CPT

## 2021-06-03 PROCEDURE — 85384 FIBRINOGEN ACTIVITY: CPT

## 2021-06-03 PROCEDURE — 71275 CT ANGIOGRAPHY CHEST: CPT

## 2021-06-03 PROCEDURE — 96360 HYDRATION IV INFUSION INIT: CPT

## 2021-06-03 PROCEDURE — 93005 ELECTROCARDIOGRAM TRACING: CPT

## 2021-06-03 PROCEDURE — 80053 COMPREHEN METABOLIC PANEL: CPT

## 2021-06-03 PROCEDURE — 84100 ASSAY OF PHOSPHORUS: CPT

## 2021-06-03 PROCEDURE — 84145 PROCALCITONIN (PCT): CPT

## 2021-06-03 PROCEDURE — 83605 ASSAY OF LACTIC ACID: CPT

## 2021-06-03 PROCEDURE — 99285 EMERGENCY DEPT VISIT HI MDM: CPT | Mod: 25

## 2021-06-03 PROCEDURE — 85379 FIBRIN DEGRADATION QUANT: CPT

## 2021-06-03 PROCEDURE — 85730 THROMBOPLASTIN TIME PARTIAL: CPT

## 2021-06-03 PROCEDURE — U0003: CPT

## 2021-06-03 PROCEDURE — 85027 COMPLETE CBC AUTOMATED: CPT

## 2021-06-03 PROCEDURE — 84484 ASSAY OF TROPONIN QUANT: CPT

## 2021-06-11 ENCOUNTER — APPOINTMENT (OUTPATIENT)
Dept: FAMILY MEDICINE | Facility: CLINIC | Age: 71
End: 2021-06-11
Payer: MEDICARE

## 2021-06-11 VITALS
WEIGHT: 135 LBS | HEART RATE: 77 BPM | DIASTOLIC BLOOD PRESSURE: 62 MMHG | SYSTOLIC BLOOD PRESSURE: 160 MMHG | BODY MASS INDEX: 23.05 KG/M2 | TEMPERATURE: 97.3 F | HEIGHT: 64 IN | RESPIRATION RATE: 14 BRPM | OXYGEN SATURATION: 97 %

## 2021-06-11 DIAGNOSIS — U07.1 COVID-19: ICD-10-CM

## 2021-06-11 DIAGNOSIS — B94.8 SEQUELAE OF OTHER SPECIFIED INFECTIOUS AND PARASITIC DISEASES: ICD-10-CM

## 2021-06-11 DIAGNOSIS — I10 ESSENTIAL (PRIMARY) HYPERTENSION: ICD-10-CM

## 2021-06-11 DIAGNOSIS — J44.9 CHRONIC OBSTRUCTIVE PULMONARY DISEASE, UNSPECIFIED: ICD-10-CM

## 2021-06-11 DIAGNOSIS — N52.9 MALE ERECTILE DYSFUNCTION, UNSPECIFIED: ICD-10-CM

## 2021-06-11 DIAGNOSIS — I63.9 CEREBRAL INFARCTION, UNSPECIFIED: ICD-10-CM

## 2021-06-11 PROCEDURE — 99214 OFFICE O/P EST MOD 30 MIN: CPT | Mod: CS

## 2021-06-11 NOTE — ASSESSMENT
[FreeTextEntry1] : Assessment and plan:\par \par 1.  Status post COVID-19 infection patient has totally recovered oxygen saturation is excellent exercise capacity has increased.  Patient has return to work without any problems.  No longer needs oxygen.\par \par 2.  Atherosclerosis continue aspirin 81 mg p.o. daily and metoprolol patient presently chest pain-free.\par \par 3.  Hypertension patient's blood pressure is under excellent control I recommend continuing present medical management with metoprolol 25 mg p.o. twice a day and valsartan hydrochlorothiazide 160-25 mg p.o. daily patient is tolerating medications and doing well.\par \par 4.  Chronic obstructive pulmonary disease presently well controlled continue present inhalers as needed patient states that he basically has not been taking the Symbicort because he is feeling well.  He does have both Brio Ellipta and Symbicort available just in case he needs it presently there is no need for.\par \par 5.  History of colon cancer metastatic to liver and also to lungs patient is doing well he is followed by hematology oncology status post colon resection and presently as stated earlier close observation by oncology.\par \par 6.  ED patient has responded to Cialis continue present medical management.\par \par 7.  Status post CVA which has totally resolved patient has no residuals continue aspirin 81 mg.\par \par 8.  Reviewed with patient comprehensive blood work which was done at last visit stable for patient in fact well.

## 2021-06-11 NOTE — HISTORY OF PRESENT ILLNESS
[FreeTextEntry1] : See HPI. [de-identified] : Patient is a 71-year-old gentleman who presents today for follow-up and disease management patient states that he is been in his usual state of health he is feeling much better status post Covid 19 infection I would have to say that the patient is post Covid which has totally recovered.  We will be addressing the above, coronary artery disease, hypertension, COPD,: CVA, history of CVA without residuals and  ED.

## 2021-06-11 NOTE — HEALTH RISK ASSESSMENT
[Yes] : Yes [4 or more  times a week (4 pts)] : 4 or more  times a week (4 points) [1 or 2 (0 pts)] : 1 or 2 (0 points) [Never (0 pts)] : Never (0 points) [No] : In the past 12 months have you used drugs other than those required for medical reasons? No [No falls in past year] : Patient reported no falls in the past year [0] : 2) Feeling down, depressed, or hopeless: Not at all (0) [Reviewed no changes] : Reviewed no changes [] : No [Audit-CScore] : 4 [[id number]] : 0 [AdvancecareDate] : 06/21

## 2021-07-14 DIAGNOSIS — R19.5 OTHER FECAL ABNORMALITIES: ICD-10-CM

## 2021-07-20 ENCOUNTER — NON-APPOINTMENT (OUTPATIENT)
Age: 71
End: 2021-07-20

## 2021-07-20 LAB
ALBUMIN SERPL ELPH-MCNC: 3.8 G/DL
ALP BLD-CCNC: 318 U/L
ALT SERPL-CCNC: 73 U/L
ANION GAP SERPL CALC-SCNC: 18 MMOL/L
AST SERPL-CCNC: 139 U/L
BASOPHILS # BLD AUTO: 0.02 K/UL
BASOPHILS NFR BLD AUTO: 0.2 %
BILIRUB SERPL-MCNC: 0.9 MG/DL
BUN SERPL-MCNC: 39 MG/DL
CALCIUM SERPL-MCNC: 9.8 MG/DL
CEA SERPL-MCNC: 28.6 NG/ML
CHLORIDE SERPL-SCNC: 104 MMOL/L
CHOLEST SERPL-MCNC: 109 MG/DL
CO2 SERPL-SCNC: 18 MMOL/L
CREAT SERPL-MCNC: 1.65 MG/DL
EOSINOPHIL # BLD AUTO: 0.07 K/UL
EOSINOPHIL NFR BLD AUTO: 0.6 %
ESTIMATED AVERAGE GLUCOSE: 100 MG/DL
GLUCOSE SERPL-MCNC: 153 MG/DL
HBA1C MFR BLD HPLC: 5.1 %
HCT VFR BLD CALC: 28.5 %
HDLC SERPL-MCNC: 33 MG/DL
HEMOCCULT STL QL IA: NEGATIVE
HGB BLD-MCNC: 8.7 G/DL
IMM GRANULOCYTES NFR BLD AUTO: 0.6 %
LDLC SERPL CALC-MCNC: 62 MG/DL
LYMPHOCYTES # BLD AUTO: 2.51 K/UL
LYMPHOCYTES NFR BLD AUTO: 23.1 %
MAN DIFF?: NORMAL
MCHC RBC-ENTMCNC: 29.5 PG
MCHC RBC-ENTMCNC: 30.5 GM/DL
MCV RBC AUTO: 96.6 FL
MONOCYTES # BLD AUTO: 1.41 K/UL
MONOCYTES NFR BLD AUTO: 13 %
NEUTROPHILS # BLD AUTO: 6.81 K/UL
NEUTROPHILS NFR BLD AUTO: 62.5 %
NONHDLC SERPL-MCNC: 77 MG/DL
PLATELET # BLD AUTO: 280 K/UL
POTASSIUM SERPL-SCNC: 5.4 MMOL/L
PROT SERPL-MCNC: 6.4 G/DL
RBC # BLD: 2.95 M/UL
RBC # FLD: 15.8 %
SODIUM SERPL-SCNC: 139 MMOL/L
TRIGL SERPL-MCNC: 71 MG/DL
WBC # FLD AUTO: 10.88 K/UL

## 2021-07-23 ENCOUNTER — APPOINTMENT (OUTPATIENT)
Dept: FAMILY MEDICINE | Facility: CLINIC | Age: 71
End: 2021-07-23
Payer: MEDICARE

## 2021-07-23 VITALS
RESPIRATION RATE: 14 BRPM | HEIGHT: 64 IN | OXYGEN SATURATION: 98 % | BODY MASS INDEX: 22.2 KG/M2 | DIASTOLIC BLOOD PRESSURE: 60 MMHG | WEIGHT: 130 LBS | SYSTOLIC BLOOD PRESSURE: 150 MMHG | TEMPERATURE: 97.2 F | HEART RATE: 102 BPM

## 2021-07-23 DIAGNOSIS — C18.9 MALIGNANT NEOPLASM OF COLON, UNSPECIFIED: ICD-10-CM

## 2021-07-23 DIAGNOSIS — I25.10 ATHEROSCLEROTIC HEART DISEASE OF NATIVE CORONARY ARTERY W/OUT ANGINA PECTORIS: ICD-10-CM

## 2021-07-23 DIAGNOSIS — C78.00 SECONDARY MALIGNANT NEOPLASM OF UNSPECIFIED LUNG: ICD-10-CM

## 2021-07-23 DIAGNOSIS — C78.7 MALIGNANT NEOPLASM OF COLON, UNSPECIFIED: ICD-10-CM

## 2021-07-23 DIAGNOSIS — E53.8 DEFICIENCY OF OTHER SPECIFIED B GROUP VITAMINS: ICD-10-CM

## 2021-07-23 DIAGNOSIS — D64.9 ANEMIA, UNSPECIFIED: ICD-10-CM

## 2021-07-23 PROCEDURE — 96372 THER/PROPH/DIAG INJ SC/IM: CPT

## 2021-07-23 PROCEDURE — 36415 COLL VENOUS BLD VENIPUNCTURE: CPT

## 2021-07-23 PROCEDURE — 99215 OFFICE O/P EST HI 40 MIN: CPT | Mod: 25

## 2021-07-23 RX ORDER — CYANOCOBALAMIN 1000 UG/ML
1000 INJECTION INTRAMUSCULAR; SUBCUTANEOUS
Qty: 0 | Refills: 0 | Status: COMPLETED | OUTPATIENT
Start: 2021-07-23

## 2021-07-23 RX ADMIN — CYANOCOBALAMIN 0 MCG/ML: 1000 INJECTION INTRAMUSCULAR; SUBCUTANEOUS at 00:00

## 2021-07-23 NOTE — PHYSICAL EXAM
[No Acute Distress] : no acute distress [Well Developed] : well developed [Normal Voice/Communication] : normal voice/communication [Ill-Appearing] : ill-appearing [Normal Sclera/Conjunctiva] : normal sclera/conjunctiva [PERRL] : pupils equal round and reactive to light [EOMI] : extraocular movements intact [Normal Outer Ear/Nose] : the outer ears and nose were normal in appearance [Normal Oropharynx] : the oropharynx was normal [No JVD] : no jugular venous distention [No Lymphadenopathy] : no lymphadenopathy [Supple] : supple [Thyroid Normal, No Nodules] : the thyroid was normal and there were no nodules present [No Respiratory Distress] : no respiratory distress  [No Accessory Muscle Use] : no accessory muscle use [Clear to Auscultation] : lungs were clear to auscultation bilaterally [Normal Rate] : normal rate  [Regular Rhythm] : with a regular rhythm [Normal S1, S2] : normal S1 and S2 [No Murmur] : no murmur heard [No Carotid Bruits] : no carotid bruits [No Abdominal Bruit] : a ~M bruit was not heard ~T in the abdomen [No Varicosities] : no varicosities [Pedal Pulses Present] : the pedal pulses are present [No Edema] : there was no peripheral edema [No Palpable Aorta] : no palpable aorta [No Extremity Clubbing/Cyanosis] : no extremity clubbing/cyanosis [Soft] : abdomen soft [Non Tender] : non-tender [Non-distended] : non-distended [No Masses] : no abdominal mass palpated [No HSM] : no HSM [Normal Bowel Sounds] : normal bowel sounds [Normal Posterior Cervical Nodes] : no posterior cervical lymphadenopathy [Normal Anterior Cervical Nodes] : no anterior cervical lymphadenopathy [No CVA Tenderness] : no CVA  tenderness [No Spinal Tenderness] : no spinal tenderness [No Joint Swelling] : no joint swelling [Grossly Normal Strength/Tone] : grossly normal strength/tone [No Rash] : no rash [Coordination Grossly Intact] : coordination grossly intact [No Focal Deficits] : no focal deficits [Normal Gait] : normal gait [Speech Grossly Normal] : speech grossly normal [Deep Tendon Reflexes (DTR)] : deep tendon reflexes were 2+ and symmetric [Memory Grossly Normal] : memory grossly normal [Normal Affect] : the affect was normal [Alert and Oriented x3] : oriented to person, place, and time [Normal Mood] : the mood was normal [Normal Insight/Judgement] : insight and judgment were intact [de-identified] : Pale

## 2021-07-23 NOTE — HISTORY OF PRESENT ILLNESS
[Spouse] : spouse [FreeTextEntry1] : See HPI. [de-identified] : Patient is a 71-year-old gentleman who presents today for follow-up and disease management he is accompanied by his spouse most recently the patient has been feeling extremely fatigued in fact his most recent blood work shows that his hemoglobin has dropped to 8.7 and his CEA has increased to 28.6.  Patient has a medical history that significant for colon cancer status post resection and his CEA has been within normal limits ever since this is the first time that is found to be elevated.  Medical history is significant for coronary artery disease, hypertension, chronic obstructive pulmonary disease presently the patient is anemic, fatigued and appears to be pale and tired.

## 2021-07-23 NOTE — HEALTH RISK ASSESSMENT

## 2021-07-23 NOTE — COUNSELING
[Fall prevention counseling provided] : Fall prevention counseling provided [Adequate lighting] : Adequate lighting [No throw rugs] : No throw rugs [Use proper foot wear] : Use proper foot wear [Behavioral health counseling provided] : Behavioral health counseling provided [Sleep ___ hours/day] : Sleep [unfilled] hours/day [Plan in advance] : Plan in advance [AUDIT-C Screening administered and reviewed] : AUDIT-C Screening administered and reviewed [None] : None [Good understanding] : Patient has a good understanding of lifestyle changes and steps needed to achieve self management goal

## 2021-07-23 NOTE — REVIEW OF SYSTEMS
[Fatigue] : fatigue [Melena] : melanu [Negative] : Heme/Lymph [Fever] : no fever [Chills] : no chills [Hot Flashes] : no hot flashes [Night Sweats] : no night sweats [Recent Change In Weight] : ~T no recent weight change [Shortness Of Breath] : no shortness of breath [Wheezing] : no wheezing [Cough] : no cough [Dyspnea on Exertion] : not dyspnea on exertion [Abdominal Pain] : no abdominal pain [Nausea] : no nausea [Constipation] : no constipation [Diarrhea] : no diarrhea [Vomiting] : no vomiting [Heartburn] : no heartburn

## 2021-07-23 NOTE — ASSESSMENT
[FreeTextEntry1] : Assessment and plan:\par \par 1.  It has been several weeks that the patient has not been feeling well in fact approximately 1 week ago I obtained comprehensive blood work and it showed that his hemoglobin had dropped to 8.7 and unfortunately his CEA has increased to 28.6.  Patient was losing blood and never told his wife but we did check fecal occult blood immunology and that was negative.  At this visit comprehensive blood work was obtained.  Patient has underlying history of colon cancer metastatic to liver and lung: Lesion was excised so was the lung lesion the liver lesion was treated with chemotherapy.  Patient has been followed up very closely by hematology oncology in fact his most recent visit was this past December and was set to have CT scan in September.\par \par I had a long and detailed discussion with patient and wife that if his hemoglobin has dropped further and especially if it is below 7 he is going to require transfusion he also needs a comprehensive GI work-up that is colonoscopy patient has made appointment with gastroenterology and will be seen this coming Monday July 26, 2021.  He will continue iron supplementation, vitamin C 500 mg p.o. daily and I encouraged the patient to increase p.o. intake of fluids.  Patient is against going into the hospital this evening.\par \par 2.  Hypertension blood pressure is well controlled continue valsartan hydrochlorothiazide 1 60–25 with metoprolol tartrate 25 mg twice a day.  Patient's wife checks his blood pressure regularly she said that his blood pressure has been quite labile high at times and low at times I told her to continue checking blood pressure and to notify me whether the patient becomes hypotensive or hypertensive.\par \par 3.  Chronic obstructive pulmonary disease continue Symbicort and Breo Ellipta patient has albuterol available.\par \par Face-to-face with patient and wife 45 minutes greater than 50% of that time was spent on counseling and coordination of care regarding anemia, elevated CEA and possible recurrence of colon cancer.  Follow-up with both gastroenterology for colonoscopy and hematology oncology.

## 2021-07-26 LAB
ALBUMIN SERPL ELPH-MCNC: 3.9 G/DL
ALP BLD-CCNC: 359 U/L
ALT SERPL-CCNC: 130 U/L
ANION GAP SERPL CALC-SCNC: 17 MMOL/L
AST SERPL-CCNC: 235 U/L
BASOPHILS # BLD AUTO: 0.02 K/UL
BASOPHILS NFR BLD AUTO: 0.2 %
BILIRUB SERPL-MCNC: 1.3 MG/DL
BUN SERPL-MCNC: 37 MG/DL
CALCIUM SERPL-MCNC: 9.6 MG/DL
CHLORIDE SERPL-SCNC: 100 MMOL/L
CO2 SERPL-SCNC: 21 MMOL/L
CREAT SERPL-MCNC: 1.96 MG/DL
EOSINOPHIL # BLD AUTO: 0.02 K/UL
EOSINOPHIL NFR BLD AUTO: 0.2 %
FERRITIN SERPL-MCNC: 162 NG/ML
FOLATE SERPL-MCNC: >20 NG/ML
GLUCOSE SERPL-MCNC: 110 MG/DL
HCT VFR BLD CALC: 30.8 %
HGB BLD-MCNC: 8.7 G/DL
IMM GRANULOCYTES NFR BLD AUTO: 0.7 %
IRON SATN MFR SERPL: 6 %
IRON SERPL-MCNC: 25 UG/DL
LYMPHOCYTES # BLD AUTO: 1.21 K/UL
LYMPHOCYTES NFR BLD AUTO: 11 %
MAN DIFF?: NORMAL
MCHC RBC-ENTMCNC: 28.2 GM/DL
MCHC RBC-ENTMCNC: 28.2 PG
MCV RBC AUTO: 99.7 FL
MONOCYTES # BLD AUTO: 1.4 K/UL
MONOCYTES NFR BLD AUTO: 12.7 %
NEUTROPHILS # BLD AUTO: 8.32 K/UL
NEUTROPHILS NFR BLD AUTO: 75.2 %
PLATELET # BLD AUTO: 238 K/UL
POTASSIUM SERPL-SCNC: 4.8 MMOL/L
PROT SERPL-MCNC: 6.7 G/DL
RBC # BLD: 3.09 M/UL
RBC # FLD: 16.8 %
SODIUM SERPL-SCNC: 138 MMOL/L
TIBC SERPL-MCNC: 399 UG/DL
UIBC SERPL-MCNC: 374 UG/DL
VIT B12 SERPL-MCNC: >2000 PG/ML
WBC # FLD AUTO: 11.05 K/UL

## 2021-07-27 ENCOUNTER — RESULT REVIEW (OUTPATIENT)
Age: 71
End: 2021-07-27

## 2021-07-28 ENCOUNTER — OUTPATIENT (OUTPATIENT)
Dept: OUTPATIENT SERVICES | Facility: HOSPITAL | Age: 71
LOS: 1 days | Discharge: HOME | End: 2021-07-28

## 2021-07-28 DIAGNOSIS — C18.9 MALIGNANT NEOPLASM OF COLON, UNSPECIFIED: Chronic | ICD-10-CM

## 2021-07-28 DIAGNOSIS — Z98.89 OTHER SPECIFIED POSTPROCEDURAL STATES: Chronic | ICD-10-CM

## 2021-07-28 DIAGNOSIS — C18.9 MALIGNANT NEOPLASM OF COLON, UNSPECIFIED: ICD-10-CM

## 2021-07-28 DIAGNOSIS — Z90.49 ACQUIRED ABSENCE OF OTHER SPECIFIED PARTS OF DIGESTIVE TRACT: Chronic | ICD-10-CM

## 2021-07-29 ENCOUNTER — APPOINTMENT (OUTPATIENT)
Dept: CT IMAGING | Facility: HOSPITAL | Age: 71
End: 2021-07-29
Payer: MEDICARE

## 2021-07-29 ENCOUNTER — RESULT REVIEW (OUTPATIENT)
Age: 71
End: 2021-07-29

## 2021-07-29 ENCOUNTER — OUTPATIENT (OUTPATIENT)
Dept: OUTPATIENT SERVICES | Facility: HOSPITAL | Age: 71
LOS: 1 days | End: 2021-07-29
Payer: MEDICARE

## 2021-07-29 ENCOUNTER — APPOINTMENT (OUTPATIENT)
Age: 71
End: 2021-07-29

## 2021-07-29 DIAGNOSIS — Z98.89 OTHER SPECIFIED POSTPROCEDURAL STATES: Chronic | ICD-10-CM

## 2021-07-29 DIAGNOSIS — C18.9 MALIGNANT NEOPLASM OF COLON, UNSPECIFIED: Chronic | ICD-10-CM

## 2021-07-29 DIAGNOSIS — Z90.49 ACQUIRED ABSENCE OF OTHER SPECIFIED PARTS OF DIGESTIVE TRACT: Chronic | ICD-10-CM

## 2021-07-29 DIAGNOSIS — D64.9 ANEMIA, UNSPECIFIED: ICD-10-CM

## 2021-07-29 DIAGNOSIS — C18.9 MALIGNANT NEOPLASM OF COLON, UNSPECIFIED: ICD-10-CM

## 2021-07-29 LAB
BASOPHILS # BLD AUTO: 0.02 K/UL — SIGNIFICANT CHANGE UP (ref 0–0.2)
BASOPHILS NFR BLD AUTO: 0.2 % — SIGNIFICANT CHANGE UP (ref 0–2)
EOSINOPHIL # BLD AUTO: 0.02 K/UL — SIGNIFICANT CHANGE UP (ref 0–0.5)
EOSINOPHIL NFR BLD AUTO: 0.2 % — SIGNIFICANT CHANGE UP (ref 0–6)
HCT VFR BLD CALC: 28.8 % — LOW (ref 39–50)
HGB BLD-MCNC: 8.8 G/DL — LOW (ref 13–17)
IMM GRANULOCYTES NFR BLD AUTO: 0.9 % — SIGNIFICANT CHANGE UP (ref 0–1.5)
LYMPHOCYTES # BLD AUTO: 1.13 K/UL — SIGNIFICANT CHANGE UP (ref 1–3.3)
LYMPHOCYTES # BLD AUTO: 12.3 % — LOW (ref 13–44)
MCHC RBC-ENTMCNC: 28.9 PG — SIGNIFICANT CHANGE UP (ref 27–34)
MCHC RBC-ENTMCNC: 30.6 G/DL — LOW (ref 32–36)
MCV RBC AUTO: 94.4 FL — SIGNIFICANT CHANGE UP (ref 80–100)
MONOCYTES # BLD AUTO: 1.37 K/UL — HIGH (ref 0–0.9)
MONOCYTES NFR BLD AUTO: 14.9 % — HIGH (ref 2–14)
NEUTROPHILS # BLD AUTO: 6.59 K/UL — SIGNIFICANT CHANGE UP (ref 1.8–7.4)
NEUTROPHILS NFR BLD AUTO: 71.5 % — SIGNIFICANT CHANGE UP (ref 43–77)
NRBC # BLD: 0 /100 WBCS — SIGNIFICANT CHANGE UP (ref 0–0)
PLATELET # BLD AUTO: 220 K/UL — SIGNIFICANT CHANGE UP (ref 150–400)
RBC # BLD: 3.05 M/UL — LOW (ref 4.2–5.8)
RBC # FLD: 17.3 % — HIGH (ref 10.3–14.5)
WBC # BLD: 9.21 K/UL — SIGNIFICANT CHANGE UP (ref 3.8–10.5)
WBC # FLD AUTO: 9.21 K/UL — SIGNIFICANT CHANGE UP (ref 3.8–10.5)

## 2021-07-29 PROCEDURE — 71260 CT THORAX DX C+: CPT | Mod: MH

## 2021-07-29 PROCEDURE — 86850 RBC ANTIBODY SCREEN: CPT

## 2021-07-29 PROCEDURE — 86900 BLOOD TYPING SEROLOGIC ABO: CPT

## 2021-07-29 PROCEDURE — 86923 COMPATIBILITY TEST ELECTRIC: CPT

## 2021-07-29 PROCEDURE — 71260 CT THORAX DX C+: CPT | Mod: 26,MH

## 2021-07-29 PROCEDURE — 74177 CT ABD & PELVIS W/CONTRAST: CPT

## 2021-07-29 PROCEDURE — 74177 CT ABD & PELVIS W/CONTRAST: CPT | Mod: 26,MH

## 2021-07-29 PROCEDURE — 86901 BLOOD TYPING SEROLOGIC RH(D): CPT

## 2021-07-31 ENCOUNTER — APPOINTMENT (OUTPATIENT)
Dept: INFUSION THERAPY | Facility: HOSPITAL | Age: 71
End: 2021-07-31

## 2021-08-02 ENCOUNTER — TRANSCRIPTION ENCOUNTER (OUTPATIENT)
Age: 71
End: 2021-08-02

## 2021-08-03 DIAGNOSIS — Z51.89 ENCOUNTER FOR OTHER SPECIFIED AFTERCARE: ICD-10-CM

## 2021-08-03 DIAGNOSIS — R11.2 NAUSEA WITH VOMITING, UNSPECIFIED: ICD-10-CM

## 2021-08-09 ENCOUNTER — INPATIENT (INPATIENT)
Facility: HOSPITAL | Age: 71
LOS: 2 days | Discharge: HOME | DRG: 435 | End: 2021-08-12
Attending: INTERNAL MEDICINE | Admitting: HOSPITALIST
Payer: MEDICARE

## 2021-08-09 VITALS
WEIGHT: 134.04 LBS | DIASTOLIC BLOOD PRESSURE: 71 MMHG | HEART RATE: 84 BPM | SYSTOLIC BLOOD PRESSURE: 146 MMHG | RESPIRATION RATE: 17 BRPM | HEIGHT: 66 IN | OXYGEN SATURATION: 97 % | TEMPERATURE: 97 F

## 2021-08-09 DIAGNOSIS — Z98.89 OTHER SPECIFIED POSTPROCEDURAL STATES: Chronic | ICD-10-CM

## 2021-08-09 DIAGNOSIS — Z90.49 ACQUIRED ABSENCE OF OTHER SPECIFIED PARTS OF DIGESTIVE TRACT: Chronic | ICD-10-CM

## 2021-08-09 DIAGNOSIS — R17 UNSPECIFIED JAUNDICE: ICD-10-CM

## 2021-08-09 DIAGNOSIS — I10 ESSENTIAL (PRIMARY) HYPERTENSION: ICD-10-CM

## 2021-08-09 DIAGNOSIS — N17.9 ACUTE KIDNEY FAILURE, UNSPECIFIED: ICD-10-CM

## 2021-08-09 DIAGNOSIS — C18.9 MALIGNANT NEOPLASM OF COLON, UNSPECIFIED: ICD-10-CM

## 2021-08-09 DIAGNOSIS — Z29.9 ENCOUNTER FOR PROPHYLACTIC MEASURES, UNSPECIFIED: ICD-10-CM

## 2021-08-09 DIAGNOSIS — C18.9 MALIGNANT NEOPLASM OF COLON, UNSPECIFIED: Chronic | ICD-10-CM

## 2021-08-09 DIAGNOSIS — E87.5 HYPERKALEMIA: ICD-10-CM

## 2021-08-09 DIAGNOSIS — E80.6 OTHER DISORDERS OF BILIRUBIN METABOLISM: ICD-10-CM

## 2021-08-09 DIAGNOSIS — Z71.89 OTHER SPECIFIED COUNSELING: ICD-10-CM

## 2021-08-09 LAB
ALBUMIN SERPL ELPH-MCNC: 2.1 G/DL — LOW (ref 3.3–5)
ALP SERPL-CCNC: 550 U/L — HIGH (ref 40–120)
ALT FLD-CCNC: 238 U/L — HIGH (ref 10–45)
AMMONIA BLD-MCNC: 58 UMOL/L — HIGH (ref 11–55)
ANION GAP SERPL CALC-SCNC: 14 MMOL/L — SIGNIFICANT CHANGE UP (ref 5–17)
APPEARANCE UR: CLEAR — SIGNIFICANT CHANGE UP
APTT BLD: 31.3 SEC — SIGNIFICANT CHANGE UP (ref 27.5–35.5)
AST SERPL-CCNC: 577 U/L — HIGH (ref 10–40)
B PERT DNA SPEC QL NAA+PROBE: SIGNIFICANT CHANGE UP
BACTERIA # UR AUTO: ABNORMAL /HPF
BASOPHILS # BLD AUTO: 0.02 K/UL — SIGNIFICANT CHANGE UP (ref 0–0.2)
BASOPHILS NFR BLD AUTO: 0.2 % — SIGNIFICANT CHANGE UP (ref 0–2)
BILIRUB SERPL-MCNC: 7.3 MG/DL — HIGH (ref 0.2–1.2)
BILIRUB UR-MCNC: ABNORMAL
BLD GP AB SCN SERPL QL: SIGNIFICANT CHANGE UP
BUN SERPL-MCNC: 50 MG/DL — HIGH (ref 7–23)
C PNEUM DNA SPEC QL NAA+PROBE: SIGNIFICANT CHANGE UP
CALCIUM SERPL-MCNC: 9.2 MG/DL — SIGNIFICANT CHANGE UP (ref 8.4–10.5)
CHLORIDE SERPL-SCNC: 103 MMOL/L — SIGNIFICANT CHANGE UP (ref 96–108)
CO2 SERPL-SCNC: 18 MMOL/L — LOW (ref 22–31)
COLOR SPEC: ABNORMAL
CREAT SERPL-MCNC: 1.7 MG/DL — HIGH (ref 0.5–1.3)
DIFF PNL FLD: NEGATIVE — SIGNIFICANT CHANGE UP
EOSINOPHIL # BLD AUTO: 0.01 K/UL — SIGNIFICANT CHANGE UP (ref 0–0.5)
EOSINOPHIL NFR BLD AUTO: 0.1 % — SIGNIFICANT CHANGE UP (ref 0–6)
EPI CELLS # UR: SIGNIFICANT CHANGE UP
FLUAV H1 2009 PAND RNA SPEC QL NAA+PROBE: SIGNIFICANT CHANGE UP
FLUAV H1 RNA SPEC QL NAA+PROBE: SIGNIFICANT CHANGE UP
FLUAV H3 RNA SPEC QL NAA+PROBE: SIGNIFICANT CHANGE UP
FLUAV SUBTYP SPEC NAA+PROBE: SIGNIFICANT CHANGE UP
FLUBV RNA SPEC QL NAA+PROBE: SIGNIFICANT CHANGE UP
GLUCOSE SERPL-MCNC: 138 MG/DL — HIGH (ref 70–99)
GLUCOSE UR QL: NEGATIVE — SIGNIFICANT CHANGE UP
HADV DNA SPEC QL NAA+PROBE: SIGNIFICANT CHANGE UP
HCOV PNL SPEC NAA+PROBE: SIGNIFICANT CHANGE UP
HCT VFR BLD CALC: 33.2 % — LOW (ref 39–50)
HGB BLD-MCNC: 10.3 G/DL — LOW (ref 13–17)
HMPV RNA SPEC QL NAA+PROBE: SIGNIFICANT CHANGE UP
HPIV1 RNA SPEC QL NAA+PROBE: SIGNIFICANT CHANGE UP
HPIV2 RNA SPEC QL NAA+PROBE: SIGNIFICANT CHANGE UP
HPIV3 RNA SPEC QL NAA+PROBE: SIGNIFICANT CHANGE UP
HPIV4 RNA SPEC QL NAA+PROBE: SIGNIFICANT CHANGE UP
HYALINE CASTS # UR AUTO: ABNORMAL
IMM GRANULOCYTES NFR BLD AUTO: 1.6 % — HIGH (ref 0–1.5)
INR BLD: 1.42 RATIO — HIGH (ref 0.88–1.16)
KETONES UR-MCNC: NEGATIVE — SIGNIFICANT CHANGE UP
LEUKOCYTE ESTERASE UR-ACNC: ABNORMAL
LIDOCAIN IGE QN: 284 U/L — SIGNIFICANT CHANGE UP (ref 73–393)
LYMPHOCYTES # BLD AUTO: 0.83 K/UL — LOW (ref 1–3.3)
LYMPHOCYTES # BLD AUTO: 6.8 % — LOW (ref 13–44)
MCHC RBC-ENTMCNC: 28.3 PG — SIGNIFICANT CHANGE UP (ref 27–34)
MCHC RBC-ENTMCNC: 31 GM/DL — LOW (ref 32–36)
MCV RBC AUTO: 91.2 FL — SIGNIFICANT CHANGE UP (ref 80–100)
MONOCYTES # BLD AUTO: 1.5 K/UL — HIGH (ref 0–0.9)
MONOCYTES NFR BLD AUTO: 12.3 % — SIGNIFICANT CHANGE UP (ref 2–14)
NEUTROPHILS # BLD AUTO: 9.68 K/UL — HIGH (ref 1.8–7.4)
NEUTROPHILS NFR BLD AUTO: 79 % — HIGH (ref 43–77)
NITRITE UR-MCNC: NEGATIVE — SIGNIFICANT CHANGE UP
NRBC # BLD: 0 /100 WBCS — SIGNIFICANT CHANGE UP (ref 0–0)
NT-PROBNP SERPL-SCNC: 282 PG/ML — SIGNIFICANT CHANGE UP (ref 0–300)
PH UR: 5 — SIGNIFICANT CHANGE UP (ref 5–8)
PLATELET # BLD AUTO: 241 K/UL — SIGNIFICANT CHANGE UP (ref 150–400)
POTASSIUM SERPL-MCNC: 5.6 MMOL/L — HIGH (ref 3.5–5.3)
POTASSIUM SERPL-SCNC: 5.6 MMOL/L — HIGH (ref 3.5–5.3)
PROT SERPL-MCNC: 5.9 G/DL — LOW (ref 6–8.3)
PROT UR-MCNC: 30 MG/DL
PROTHROM AB SERPL-ACNC: 16.9 SEC — HIGH (ref 10.6–13.6)
RAPID RVP RESULT: SIGNIFICANT CHANGE UP
RBC # BLD: 3.64 M/UL — LOW (ref 4.2–5.8)
RBC # FLD: 20.7 % — HIGH (ref 10.3–14.5)
RBC CASTS # UR COMP ASSIST: SIGNIFICANT CHANGE UP /HPF (ref 0–4)
RSV RNA SPEC QL NAA+PROBE: SIGNIFICANT CHANGE UP
RV+EV RNA SPEC QL NAA+PROBE: SIGNIFICANT CHANGE UP
SARS-COV-2 RNA SPEC QL NAA+PROBE: SIGNIFICANT CHANGE UP
SODIUM SERPL-SCNC: 135 MMOL/L — SIGNIFICANT CHANGE UP (ref 135–145)
SP GR SPEC: 1.02 — SIGNIFICANT CHANGE UP (ref 1.01–1.02)
UROBILINOGEN FLD QL: 8
WBC # BLD: 12.23 K/UL — HIGH (ref 3.8–10.5)
WBC # FLD AUTO: 12.23 K/UL — HIGH (ref 3.8–10.5)
WBC UR QL: SIGNIFICANT CHANGE UP /HPF (ref 0–5)

## 2021-08-09 PROCEDURE — 71045 X-RAY EXAM CHEST 1 VIEW: CPT | Mod: 26

## 2021-08-09 PROCEDURE — 93010 ELECTROCARDIOGRAM REPORT: CPT

## 2021-08-09 PROCEDURE — 99497 ADVNCD CARE PLAN 30 MIN: CPT | Mod: 25

## 2021-08-09 PROCEDURE — 99223 1ST HOSP IP/OBS HIGH 75: CPT

## 2021-08-09 PROCEDURE — 99285 EMERGENCY DEPT VISIT HI MDM: CPT

## 2021-08-09 PROCEDURE — 74176 CT ABD & PELVIS W/O CONTRAST: CPT | Mod: 26,MA

## 2021-08-09 RX ORDER — FUROSEMIDE 40 MG
20 TABLET ORAL ONCE
Refills: 0 | Status: COMPLETED | OUTPATIENT
Start: 2021-08-09 | End: 2021-08-09

## 2021-08-09 RX ORDER — HEPARIN SODIUM 5000 [USP'U]/ML
5000 INJECTION INTRAVENOUS; SUBCUTANEOUS EVERY 8 HOURS
Refills: 0 | Status: DISCONTINUED | OUTPATIENT
Start: 2021-08-09 | End: 2021-08-10

## 2021-08-09 RX ORDER — SODIUM POLYSTYRENE SULFONATE 4.1 MEQ/G
30 POWDER, FOR SUSPENSION ORAL ONCE
Refills: 0 | Status: COMPLETED | OUTPATIENT
Start: 2021-08-09 | End: 2021-08-09

## 2021-08-09 RX ORDER — LOSARTAN/HYDROCHLOROTHIAZIDE 100MG-25MG
1 TABLET ORAL
Qty: 0 | Refills: 0 | DISCHARGE

## 2021-08-09 RX ORDER — ONDANSETRON 8 MG/1
4 TABLET, FILM COATED ORAL EVERY 8 HOURS
Refills: 0 | Status: DISCONTINUED | OUTPATIENT
Start: 2021-08-09 | End: 2021-08-12

## 2021-08-09 RX ORDER — AMLODIPINE BESYLATE 2.5 MG/1
0 TABLET ORAL
Qty: 0 | Refills: 0 | DISCHARGE

## 2021-08-09 RX ADMIN — Medication 20 MILLIGRAM(S): at 18:18

## 2021-08-09 RX ADMIN — SODIUM POLYSTYRENE SULFONATE 30 GRAM(S): 4.1 POWDER, FOR SUSPENSION ORAL at 18:18

## 2021-08-09 RX ADMIN — HEPARIN SODIUM 5000 UNIT(S): 5000 INJECTION INTRAVENOUS; SUBCUTANEOUS at 21:18

## 2021-08-09 NOTE — ED PROVIDER NOTE - ATTENDING CONTRIBUTION TO CARE
Amanda with [facility]. pt 72 yo m hx colorectal cancer 2014, metastatic to liver and has been treated with chemotherapy. He has completed chemo and surgery for resection of lung and liver lesions. Patient seen by PMD 7/2021 and found to have new onset anemia requiring a transfusion. He also has had decreased appetite and swollen ankles. He had f/u labs and CT scan ordered by his PMD 7/29 which shows a reoccurrence of cancer with mets and is scheduled to see his oncologist next week. As per pt and his son he was doing well until recently. pt also having abd and leg swelling.   His last visit with oncologist in Nov 2020 was unremarkable and he had his port removed 2/2021.  Pt denies abd pain, nausea or vomiting, rectal bleeding or change in bowel habits  scleral icterus and jaundice with new mets. ct shows no obstruction. discussed with Dr. Yates of [facility] and she will consult on pt in AM    I performed a face to face bedside interview with patient regarding history of present illness, review of symptoms and past medical history. I completed an independent physical exam.  I have discussed the patient's plan of care with Physician Assistant (PA). I agree with note as stated above, having amended the EMR as needed to reflect my findings.   This includes History of Present Illness, HIV, Past Medical/Surgical/Family/Social History, Allergies and Home Medications, Review of Systems, Physical Exam, and any Progress Notes during the time I functioned as the attending physician for this patient.

## 2021-08-09 NOTE — H&P ADULT - CONVERSATION DETAILS
Discussed the findings of recurrent extensive metastatic lesion on liver and new findings of lesions on spleen.

## 2021-08-09 NOTE — ED PROVIDER NOTE - OBJECTIVE STATEMENT
pt 72 yo m hx colorectal cancer 2014, metastatic to liver and has been treated with chemotherapy. He has completed chemo and surgery for resection of lung and liver lesions. Patient seen by PMD 7/2021 and found to have new onset anemia requiring a transfusion. He also has had decreased appetite and swollen ankles. He had f/u labs and CT scan ordered by his PMD 7/29 which shows a reoccurrence of ca with mets and is scheduled to see his oncologist next week. As per pt and his son he was doing well until. pt also having abd and leg swelling  recently. His last visit with oncologist in Nov 2020 was unremarkable and he had his port removed 2/2021.  Pt denies abd pain, nausea or vomiting, rectal bleeding or change in bowel habits

## 2021-08-09 NOTE — ED ADULT NURSE NOTE - OBJECTIVE STATEMENT
patient c/o distended abd and weight loss past week, noted to be jaundiced, reports PMH of liver colon and lung cancer

## 2021-08-09 NOTE — ED PROVIDER NOTE - CLINICAL SUMMARY MEDICAL DECISION MAKING FREE TEXT BOX
pt 70 yo m hx colorectal cancer 2014, metastatic to liver and has been treated with chemotherapy. He has completed chemo and surgery for resection of lung and liver lesions. Patient seen by PMD 7/2021 and found to have new onset anemia requiring a transfusion. He also has had decreased appetite and swollen ankles. He had f/u labs and CT scan ordered by his PMD 7/29 which shows a reoccurrence of ca with mets and is scheduled to see his oncologist next week. As per pt and his son he was doing well until. pt also having abd and leg swelling  recently. His last visit with oncologist in Nov 2020 was unremarkable and he had his port removed 2/2021.  Pt denies abd pain, nausea or vomiting, rectal bleeding or change in bowel habits  scleral icterus and jaundice with new mets. ct shows no obstruction. discussed with Dr. dorcas martinez will consult on pt in AM

## 2021-08-09 NOTE — H&P ADULT - WHAT MATTERS MOST
At this time, pt and family will like time to discuss Goals of care amongst themselves as they have just learned about recurrent cancer

## 2021-08-09 NOTE — H&P ADULT - NSICDXPASTMEDICALHX_GEN_ALL_CORE_FT
PAST MEDICAL HISTORY:  Arthropathy left hip > right hip    Atherosclerosis     Carotid artery occlusion left side in November 2013  Left CEA 11/2013    Cataract of left eye     Clostridium difficile infection on short term vancomyacin    Colon cancer had surgery in March 2014 with mets to liver-- received chemotherapy    CVA (cerebral vascular accident) 2005 -- wife and patient state no residual    Depression sts was depressed "I lost my brother while going to this"    DJD (degenerative joint disease)     Hyperlipidemia     Hypertension     Liver cancer METS from the colon -- has received chemotherapy    Lumbar stenosis     Lung metastasis     Osteoarthritis     Reflux     Snoring DREW precautions -- responds affirmatively to STOP BANG questionnaire -- admits to loud snoring; age > 50; h/o htn; gender, male; h/o htn

## 2021-08-09 NOTE — ED ADULT NURSE NOTE - NSIMPLEMENTINTERV_GEN_ALL_ED
Implemented All Universal Safety Interventions:  [place] to call system. Call bell, personal items and telephone within reach. Instruct patient to call for assistance. Room bathroom lighting operational. Non-slip footwear when patient is off stretcher. Physically safe environment: no spills, clutter or unnecessary equipment. Stretcher in lowest position, wheels locked, appropriate side rails in place.

## 2021-08-09 NOTE — H&P ADULT - NSICDXPASTSURGICALHX_GEN_ALL_CORE_FT
PAST SURGICAL HISTORY:  Colon cancer diagnosed in March 2014 with subsequent colon cancer followed with chemotherapy    Colon cancer [facility] 2014 for chemotherapy due to colon cancer with METS to liver    H/O resection of liver 2015    H/O skin graft 1984    S/P carotid endarterectomy 11/2013  left side

## 2021-08-09 NOTE — H&P ADULT - ASSESSMENT
72yo female with hx of Metastatic Colon Adenocarcinoma diagnosed 10years ago was in remission, HTN, symptomatic COVID-19 infection in early 2021, presented to the ED with complaints of jaundice associated with b/l LE Swelling, noted to have nonobstructive hyperbilirubinemia/Jaundice w/ mild ascites.     #Jaundice  #Hyperbilirubinemia  #Transaminitis  #Metastatic Colon adenocarcinoma  -CT abd/pelvis with normal biliary duct caliber. Extensive metastatic disease identified   -Prior hx of metastatic disease to liver and lungs s/p lung mets resection in 2017  -Discussed case with Oncology, Dr. Yates; Will follow up in the AM   -Discussed case with GI; Follow up in AM    #B/L LE swelling  -Likely secondary to hypoalbuminemia leading to mild ascites and LE edema  -In the presence of KARYNA, will give a trial of Lasix to assess for possible hepatorenal synd  -Leg elevation and compression stocks    #KARYNA  -Prerenal etiology  -Assess after one time Lasix  -Monitor renal function    #HTN  -Home med Metoprolol XL 25mg daily  -Hold as normotensive  -Monitor    #Hyperkalemia  -Kayexalate once   -Monitor     #DVT ppx  HSQ 72yo female with hx of Metastatic Colon Adenocarcinoma diagnosed 10years ago was in remission, HTN, symptomatic COVID-19 infection in early 2021, presented to the ED with complaints of jaundice associated with b/l LE Swelling, noted to have nonobstructive hyperbilirubinemia/Jaundice w/ mild ascites.     #Jaundice  #Hyperbilirubinemia  #Transaminitis  #Metastatic Colon adenocarcinoma  -CT abd/pelvis with normal biliary duct caliber. Extensive metastatic disease identified   -Prior hx of metastatic disease to liver and lungs s/p lung mets resection in 2017  -Discussed case with Oncology, Dr. Yates; Will follow up in the AM   -Discussed case with GI; Follow up in AM  -Pt will likely benefit from palliative consult/referral    #B/L LE swelling  -Likely secondary to hypoalbuminemia leading to mild ascites and LE edema  -In the presence of KARYNA, will give a trial of Lasix to assess for possible hepatorenal synd  -Leg elevation and compression stocks    #KARYNA  -Prerenal etiology  -Assess after one time Lasix  -Monitor renal function    #HTN  -Home med Metoprolol XL 25mg daily  -Hold as normotensive  -Monitor    #Hyperkalemia  -Kayexalate once   -Monitor     #DVT ppx  HSQ 70yo male with hx of Metastatic Colon Adenocarcinoma diagnosed 10years ago was in remission, HTN, symptomatic COVID-19 infection in early 2021, presented to the ED with complaints of jaundice associated with b/l LE Swelling, noted to have nonobstructive hyperbilirubinemia/Jaundice w/ mild ascites.     #Jaundice  #Hyperbilirubinemia  #Transaminitis  #Metastatic Colon adenocarcinoma  -CT abd/pelvis with normal biliary duct caliber. Extensive metastatic disease identified   -Prior hx of metastatic disease to liver and lungs s/p lung mets resection in 2017  -Discussed case with Oncology, Dr. Yates; Will follow up in the AM   -Discussed case with GI; Follow up in AM  -Pt will likely benefit from palliative consult/referral    #B/L LE swelling  -Likely secondary to hypoalbuminemia leading to mild ascites and LE edema  -In the presence of KARYNA, will give a trial of Lasix to assess for possible hepatorenal synd  -Leg elevation and compression stocks    #KARYNA  -Prerenal etiology  -Assess after one time Lasix  -Monitor renal function    #HTN  -Home med Metoprolol XL 25mg daily  -Hold as normotensive  -Monitor    #Hyperkalemia  -Kayexalate once   -Monitor     #DVT ppx  HSQ

## 2021-08-09 NOTE — H&P ADULT - HISTORY OF PRESENT ILLNESS
70yo male with hx of     Of note pt  70yo female with hx of Metastatic Colon Adenocarcinoma diagnosed 10years ago was in remission, HTN, symptomatic COVID-19 infection in early 2021, presented to the ED with complaints of jaundice associated with b/l LE Swelling. Pt accompanied by his wife who is also providing hx. Per both of them, pt was noted on routine follow up to be significant anemic a few weeks ago. He was scheduled for 2unit PRBC transfusion. Since the transfusion, pt states he has noted b/l le swelling as well as his belly. He denies any pain or discomfort. Pt or family cannot recall how long he has been noted to be yellow. He does endorse decrease in appetite. Denies cp, palpitations, sob, abd pain, N/V, fever, chills.   Of note, pt is scheduled to see his oncologist, Dr. Banks, in 2 days to follow up on his CT scan he recently had done to assess for recurrence of his cancer.  70yo male with hx of Metastatic Colon Adenocarcinoma diagnosed 10years ago was in remission, HTN, symptomatic COVID-19 infection in early 2021, presented to the ED with complaints of jaundice associated with b/l LE Swelling. Pt accompanied by his wife who is also providing hx. Per both of them, pt was noted on routine follow up to be significant anemic a few weeks ago. He was scheduled for 2unit PRBC transfusion. Since the transfusion, pt states he has noted b/l le swelling as well as his belly. He denies any pain or discomfort. Pt or family cannot recall how long he has been noted to be yellow. He does endorse decrease in appetite. Denies cp, palpitations, sob, abd pain, N/V, fever, chills.   Of note, pt is scheduled to see his oncologist, Dr. Banks, in 2 days to follow up on his CT scan he recently had done to assess for recurrence of his cancer.

## 2021-08-09 NOTE — ED PROVIDER NOTE - CARE PLAN
Principal Discharge DX:	Jaundice  Secondary Diagnosis:	Anasarca  Secondary Diagnosis:	Hyperbilirubinemia

## 2021-08-09 NOTE — H&P ADULT - NSICDXFAMILYHX_GEN_ALL_CORE_FT
FAMILY HISTORY:  Sibling  Still living? No  Family history of colon cancer, Age at diagnosis: Age Unknown  Family history of stomach cancer, Age at diagnosis: Age Unknown

## 2021-08-09 NOTE — ED PROVIDER NOTE - PHYSICAL EXAMINATION
General:     NAD, chronically ill appearing  Eyes: PERRL, scleral icteric   Head:     NC/AT   Neck:     trachea midline  Lungs:     CTA b/l  CVS:     RRR  Abd:     +BS, s/nt/nd  Ext:   b/l lower ext swelling 2+ pitting  Skin: jaundice  Neuro: AAOx3

## 2021-08-10 DIAGNOSIS — K72.90 HEPATIC FAILURE, UNSPECIFIED WITHOUT COMA: ICD-10-CM

## 2021-08-10 DIAGNOSIS — E87.2 ACIDOSIS: ICD-10-CM

## 2021-08-10 DIAGNOSIS — I50.9 HEART FAILURE, UNSPECIFIED: ICD-10-CM

## 2021-08-10 DIAGNOSIS — N17.9 ACUTE KIDNEY FAILURE, UNSPECIFIED: ICD-10-CM

## 2021-08-10 DIAGNOSIS — E11.9 TYPE 2 DIABETES MELLITUS WITHOUT COMPLICATIONS: ICD-10-CM

## 2021-08-10 LAB
ALBUMIN SERPL ELPH-MCNC: 2 G/DL — LOW (ref 3.3–5)
ALBUMIN SERPL ELPH-MCNC: 2.3 G/DL — LOW (ref 3.3–5)
ALP SERPL-CCNC: 479 U/L — HIGH (ref 40–120)
ALP SERPL-CCNC: 562 U/L — HIGH (ref 40–120)
ALT FLD-CCNC: 845 U/L — HIGH (ref 10–45)
ALT FLD-CCNC: 940 U/L — HIGH (ref 10–45)
AMMONIA BLD-MCNC: 56 UMOL/L — HIGH (ref 11–55)
ANION GAP SERPL CALC-SCNC: 14 MMOL/L — SIGNIFICANT CHANGE UP (ref 5–17)
ANION GAP SERPL CALC-SCNC: 23 MMOL/L — HIGH (ref 5–17)
ANION GAP SERPL CALC-SCNC: >30 MMOL/L — HIGH (ref 5–17)
APTT BLD: 35.5 SEC — SIGNIFICANT CHANGE UP (ref 27.5–35.5)
AST SERPL-CCNC: 3023 U/L — HIGH (ref 10–40)
AST SERPL-CCNC: 4252 U/L — HIGH (ref 10–40)
BASE EXCESS BLDA CALC-SCNC: -26.5 MMOL/L — LOW (ref -2–3)
BASOPHILS # BLD AUTO: 0.08 K/UL — SIGNIFICANT CHANGE UP (ref 0–0.2)
BASOPHILS NFR BLD AUTO: 0.4 % — SIGNIFICANT CHANGE UP (ref 0–2)
BILIRUB DIRECT SERPL-MCNC: 8 MG/DL — HIGH (ref 0–0.2)
BILIRUB INDIRECT FLD-MCNC: 1.2 MG/DL — HIGH (ref 0.2–1)
BILIRUB SERPL-MCNC: 11.2 MG/DL — HIGH (ref 0.2–1.2)
BILIRUB SERPL-MCNC: 9.2 MG/DL — HIGH (ref 0.2–1.2)
BUN SERPL-MCNC: 35 MG/DL — HIGH (ref 7–23)
BUN SERPL-MCNC: 64 MG/DL — HIGH (ref 7–23)
BUN SERPL-MCNC: 66 MG/DL — HIGH (ref 7–23)
CALCIUM SERPL-MCNC: 7.8 MG/DL — LOW (ref 8.4–10.5)
CALCIUM SERPL-MCNC: 8.9 MG/DL — SIGNIFICANT CHANGE UP (ref 8.4–10.5)
CALCIUM SERPL-MCNC: 9.5 MG/DL — SIGNIFICANT CHANGE UP (ref 8.4–10.5)
CHLORIDE SERPL-SCNC: 100 MMOL/L — SIGNIFICANT CHANGE UP (ref 96–108)
CHLORIDE SERPL-SCNC: 102 MMOL/L — SIGNIFICANT CHANGE UP (ref 96–108)
CHLORIDE SERPL-SCNC: 97 MMOL/L — SIGNIFICANT CHANGE UP (ref 96–108)
CO2 BLDA-SCNC: 4 MMOL/L — LOW (ref 19–24)
CO2 SERPL-SCNC: 11 MMOL/L — LOW (ref 22–31)
CO2 SERPL-SCNC: 25 MMOL/L — SIGNIFICANT CHANGE UP (ref 22–31)
CO2 SERPL-SCNC: <5 MMOL/L — CRITICAL LOW (ref 22–31)
COVID-19 SPIKE DOMAIN AB INTERP: POSITIVE
COVID-19 SPIKE DOMAIN ANTIBODY RESULT: >250 U/ML — HIGH
CREAT SERPL-MCNC: 1.7 MG/DL — HIGH (ref 0.5–1.3)
CREAT SERPL-MCNC: 2.38 MG/DL — HIGH (ref 0.5–1.3)
CREAT SERPL-MCNC: 2.7 MG/DL — HIGH (ref 0.5–1.3)
EOSINOPHIL # BLD AUTO: 0.01 K/UL — SIGNIFICANT CHANGE UP (ref 0–0.5)
EOSINOPHIL NFR BLD AUTO: 0.1 % — SIGNIFICANT CHANGE UP (ref 0–6)
GLUCOSE BLDC GLUCOMTR-MCNC: 146 MG/DL — HIGH (ref 70–99)
GLUCOSE BLDC GLUCOMTR-MCNC: 162 MG/DL — HIGH (ref 70–99)
GLUCOSE BLDC GLUCOMTR-MCNC: 175 MG/DL — HIGH (ref 70–99)
GLUCOSE BLDC GLUCOMTR-MCNC: 50 MG/DL — CRITICAL LOW (ref 70–99)
GLUCOSE SERPL-MCNC: 149 MG/DL — HIGH (ref 70–99)
GLUCOSE SERPL-MCNC: 309 MG/DL — HIGH (ref 70–99)
GLUCOSE SERPL-MCNC: 40 MG/DL — CRITICAL LOW (ref 70–99)
HCO3 BLDA-SCNC: 3 MMOL/L — CRITICAL LOW (ref 21–28)
HCT VFR BLD CALC: 36.8 % — LOW (ref 39–50)
HGB BLD-MCNC: 10.9 G/DL — LOW (ref 13–17)
HOROWITZ INDEX BLDA+IHG-RTO: 21 — SIGNIFICANT CHANGE UP
IMM GRANULOCYTES NFR BLD AUTO: 5 % — HIGH (ref 0–1.5)
INR BLD: 2.72 RATIO — HIGH (ref 0.88–1.16)
LACTATE SERPL-SCNC: 10.1 MMOL/L — CRITICAL HIGH (ref 0.7–2)
LACTATE SERPL-SCNC: 21.6 MMOL/L — CRITICAL HIGH (ref 0.7–2)
LYMPHOCYTES # BLD AUTO: 1.18 K/UL — SIGNIFICANT CHANGE UP (ref 1–3.3)
LYMPHOCYTES # BLD AUTO: 6.3 % — LOW (ref 13–44)
MCHC RBC-ENTMCNC: 28.8 PG — SIGNIFICANT CHANGE UP (ref 27–34)
MCHC RBC-ENTMCNC: 29.6 GM/DL — LOW (ref 32–36)
MCV RBC AUTO: 97.4 FL — SIGNIFICANT CHANGE UP (ref 80–100)
MONOCYTES # BLD AUTO: 2.46 K/UL — HIGH (ref 0–0.9)
MONOCYTES NFR BLD AUTO: 13.2 % — SIGNIFICANT CHANGE UP (ref 2–14)
NEUTROPHILS # BLD AUTO: 14.01 K/UL — HIGH (ref 1.8–7.4)
NEUTROPHILS NFR BLD AUTO: 75 % — SIGNIFICANT CHANGE UP (ref 43–77)
NRBC # BLD: 0 /100 WBCS — SIGNIFICANT CHANGE UP (ref 0–0)
PCO2 BLDA: <19 MMHG — LOW (ref 35–48)
PH BLDA: 7.09 — CRITICAL LOW (ref 7.35–7.45)
PLATELET # BLD AUTO: 298 K/UL — SIGNIFICANT CHANGE UP (ref 150–400)
PO2 BLDA: 110 MMHG — HIGH (ref 83–108)
POTASSIUM SERPL-MCNC: 3.9 MMOL/L — SIGNIFICANT CHANGE UP (ref 3.5–5.3)
POTASSIUM SERPL-MCNC: 6.6 MMOL/L — CRITICAL HIGH (ref 3.5–5.3)
POTASSIUM SERPL-MCNC: 7.3 MMOL/L — CRITICAL HIGH (ref 3.5–5.3)
POTASSIUM SERPL-SCNC: 3.9 MMOL/L — SIGNIFICANT CHANGE UP (ref 3.5–5.3)
POTASSIUM SERPL-SCNC: 6.6 MMOL/L — CRITICAL HIGH (ref 3.5–5.3)
POTASSIUM SERPL-SCNC: 7.3 MMOL/L — CRITICAL HIGH (ref 3.5–5.3)
PROT SERPL-MCNC: 5.1 G/DL — LOW (ref 6–8.3)
PROT SERPL-MCNC: 6.2 G/DL — SIGNIFICANT CHANGE UP (ref 6–8.3)
PROTHROM AB SERPL-ACNC: 31.4 SEC — HIGH (ref 10.6–13.6)
RBC # BLD: 3.78 M/UL — LOW (ref 4.2–5.8)
RBC # FLD: 22.3 % — HIGH (ref 10.3–14.5)
SAO2 % BLDA: 97.8 % — SIGNIFICANT CHANGE UP (ref 94–98)
SARS-COV-2 IGG+IGM SERPL QL IA: >250 U/ML — HIGH
SARS-COV-2 IGG+IGM SERPL QL IA: POSITIVE
SODIUM SERPL-SCNC: 132 MMOL/L — LOW (ref 135–145)
SODIUM SERPL-SCNC: 136 MMOL/L — SIGNIFICANT CHANGE UP (ref 135–145)
SODIUM SERPL-SCNC: 139 MMOL/L — SIGNIFICANT CHANGE UP (ref 135–145)
TROPONIN I SERPL-MCNC: 0.11 NG/ML — HIGH (ref 0.02–0.06)
WBC # BLD: 18.67 K/UL — HIGH (ref 3.8–10.5)
WBC # FLD AUTO: 18.67 K/UL — HIGH (ref 3.8–10.5)

## 2021-08-10 PROCEDURE — 36556 INSERT NON-TUNNEL CV CATH: CPT | Mod: RT

## 2021-08-10 PROCEDURE — 99223 1ST HOSP IP/OBS HIGH 75: CPT

## 2021-08-10 PROCEDURE — 99233 SBSQ HOSP IP/OBS HIGH 50: CPT

## 2021-08-10 PROCEDURE — 93010 ELECTROCARDIOGRAM REPORT: CPT

## 2021-08-10 PROCEDURE — 93306 TTE W/DOPPLER COMPLETE: CPT | Mod: 26

## 2021-08-10 PROCEDURE — 71045 X-RAY EXAM CHEST 1 VIEW: CPT | Mod: 26

## 2021-08-10 RX ORDER — SODIUM BICARBONATE 1 MEQ/ML
0.37 SYRINGE (ML) INTRAVENOUS
Qty: 150 | Refills: 0 | Status: DISCONTINUED | OUTPATIENT
Start: 2021-08-10 | End: 2021-08-11

## 2021-08-10 RX ORDER — CALCIUM GLUCONATE 100 MG/ML
1 VIAL (ML) INTRAVENOUS ONCE
Refills: 0 | Status: COMPLETED | OUTPATIENT
Start: 2021-08-10 | End: 2021-08-10

## 2021-08-10 RX ORDER — INSULIN HUMAN 100 [IU]/ML
10 INJECTION, SOLUTION SUBCUTANEOUS ONCE
Refills: 0 | Status: COMPLETED | OUTPATIENT
Start: 2021-08-10 | End: 2021-08-10

## 2021-08-10 RX ORDER — MIDODRINE HYDROCHLORIDE 2.5 MG/1
5 TABLET ORAL THREE TIMES A DAY
Refills: 0 | Status: DISCONTINUED | OUTPATIENT
Start: 2021-08-10 | End: 2021-08-12

## 2021-08-10 RX ORDER — CHLORHEXIDINE GLUCONATE 213 G/1000ML
1 SOLUTION TOPICAL
Refills: 0 | Status: DISCONTINUED | OUTPATIENT
Start: 2021-08-10 | End: 2021-08-12

## 2021-08-10 RX ORDER — SODIUM POLYSTYRENE SULFONATE 4.1 MEQ/G
30 POWDER, FOR SUSPENSION ORAL ONCE
Refills: 0 | Status: COMPLETED | OUTPATIENT
Start: 2021-08-10 | End: 2021-08-10

## 2021-08-10 RX ORDER — SODIUM CHLORIDE 9 MG/ML
10 INJECTION INTRAMUSCULAR; INTRAVENOUS; SUBCUTANEOUS
Refills: 0 | Status: DISCONTINUED | OUTPATIENT
Start: 2021-08-10 | End: 2021-08-12

## 2021-08-10 RX ORDER — DEXTROSE 50 % IN WATER 50 %
50 SYRINGE (ML) INTRAVENOUS ONCE
Refills: 0 | Status: COMPLETED | OUTPATIENT
Start: 2021-08-10 | End: 2021-08-10

## 2021-08-10 RX ORDER — SODIUM BICARBONATE 1 MEQ/ML
100 SYRINGE (ML) INTRAVENOUS ONCE
Refills: 0 | Status: COMPLETED | OUTPATIENT
Start: 2021-08-10 | End: 2021-08-10

## 2021-08-10 RX ADMIN — Medication 50 MILLILITER(S): at 08:45

## 2021-08-10 RX ADMIN — Medication 50 MILLILITER(S): at 13:24

## 2021-08-10 RX ADMIN — HEPARIN SODIUM 5000 UNIT(S): 5000 INJECTION INTRAVENOUS; SUBCUTANEOUS at 05:29

## 2021-08-10 RX ADMIN — Medication 100 MILLIEQUIVALENT(S): at 10:45

## 2021-08-10 RX ADMIN — Medication 100 GRAM(S): at 09:24

## 2021-08-10 RX ADMIN — Medication 150 MEQ/KG/HR: at 19:30

## 2021-08-10 RX ADMIN — INSULIN HUMAN 10 UNIT(S): 100 INJECTION, SOLUTION SUBCUTANEOUS at 13:25

## 2021-08-10 RX ADMIN — Medication 150 MEQ/KG/HR: at 10:45

## 2021-08-10 RX ADMIN — MIDODRINE HYDROCHLORIDE 5 MILLIGRAM(S): 2.5 TABLET ORAL at 16:47

## 2021-08-10 RX ADMIN — SODIUM POLYSTYRENE SULFONATE 30 GRAM(S): 4.1 POWDER, FOR SUSPENSION ORAL at 09:24

## 2021-08-10 NOTE — CONSULT NOTE ADULT - ASSESSMENT
71 year old prior COVID positive male with extensive PMH including metastatic colon adenocarcinoma with reoccurrence in 1/21, HTN, CVA, dyslipidemia, OA, DREW, GERD, depression 71 year old prior COVID positive male with extensive PMH including metastatic colon adenocarcinoma with reoccurrence in 1/21, HTN, CVA, dyslipidemia, OA, DREW, GERD, depression now presenting with acute metabolic acidosis secondary to acute kidney failure

## 2021-08-10 NOTE — SWALLOW BEDSIDE ASSESSMENT ADULT - SWALLOW EVAL: DIAGNOSIS
Patient awake and alert, A&O Pt awake and alert, A&O x 3. Pt presents with moderate oral dysphagia. Pt administered trials of ice chips, thin liquids, puree and soft and bite size solids. Pt presenting with Xerostomia prior to PO, however, denied oral care. RN and MD were present during assessment. Oral phase deficits marked by prolonged and incomplete mastication observed with soft and bite size solids resulting in incomplete bolus formation/manipulation. Suspect delayed A-P transport and swallow initiation. Pharyngeal phase characterized by consistent hyolaryngeal elevation/excursion appreciated upon palpation. Cough x 1 post swallow with soft and bite size, suspect due to moderate diffuse oral residue with pocketing noted in bilateral sulci. Pt required multiple cued liquid washes to clear oral residue. Pt refused trials of minced and moist solids. Minimal trials administered as patient refused. No overt signes of penetration/aspiration with puree/thin.

## 2021-08-10 NOTE — CONSULT NOTE ADULT - PROBLEM SELECTOR RECOMMENDATION 4
SCD's only, INR elevated due to liver failure  Protonix 40mg daily for GI  Cleared for puree with thin liquids, ice chips only for now due to acute illness  Bedrest  Currently Full Code, palliative consult for further goals of care

## 2021-08-10 NOTE — CONSULT NOTE ADULT - PROBLEM SELECTOR RECOMMENDATION 2
D/W Dr. Motley 8/9/21-patient to f/u with her in ambulatory-to discuss management options at this point. Palliative treatment intent.  I s/w patient's son Wilberto osuna am via phone-his questions answered at this time-he stated he will call Dr. Motley for f/u.
With associated liver failure, hyperbilirubinemia and jaundice.  Minimal ascites on CT likely malignant.  GI consulted, continue to trend LFT's and ammonia level, supportive care.   Dr. Motley was to follow up with him in ambulatory to discuss management options at this point. Palliative treatment intent.

## 2021-08-10 NOTE — SWALLOW BEDSIDE ASSESSMENT ADULT - COMMENTS
WBC: 18.67    CT Chest: 8/9/21: LOWER CHEST: Small layering right pleural effusion slightly increased with right basilar atelectatic changes.  IMPRESSION:  Significantly limited by lack of IV contrast.  No specific acute inflammatory or obstructive pathology.  Extensive hepatic metastases grossly similar to prior.  Normal volume abdominal pelvic ascites slightly changed. Cannot exclude malignant ascites.  Borderline to minimally enlarged retroperitoneal lymph nodes similar to prior possibly metastatic.  Additional findings as discussed

## 2021-08-10 NOTE — SWALLOW BEDSIDE ASSESSMENT ADULT - SWALLOW EVAL: CRITERIA FOR SKILLED INTERVENTION MET
follow up pending goals of care conversation/demonstrates skilled criteria for swallowing intervention

## 2021-08-10 NOTE — CONSULT NOTE ADULT - ASSESSMENT
72 y/o male pmh Colon Ca with mets to liver and lung and resection in 2014. His first colonoscopy was done by Dr. Coto in 2014, and patient was noted to have colon cancer. He was in remission up until now. CT abdomen shows multiple liver lesions and patient is now in liver and kidney failure.

## 2021-08-10 NOTE — CONSULT NOTE ADULT - SUBJECTIVE AND OBJECTIVE BOX
MORALES MORILLO  71y  Male  Admitting: DENG Moreau    HPI:  72yo male with hx of Metastatic Colon Adenocarcinoma diagnosed 10years ago subsequently in remission, HTN, symptomatic COVID-19 infection in early 2021, who presented to the ED with complaints of jaundice associated with b/l LE swelling. Pt was noted on routine follow up to be significantly anemic a few weeks PTA, s/p PRBC transfusion. Since the transfusion, pt noted LE swelling and increased abdominal girth.  He denied any pain or discomfort. Denied cp, palpitations, sob, abd pain, N/V, fever, chills.   Of note, pt is scheduled to see his oncologist, Dr. Motley, this week to follow up on his CT scan he recently had done.    PAST MEDICAL & SURGICAL HISTORY:  Hypertension    CVA (cerebral vascular accident)  2005 -- wife and patient state no residual    Hyperlipidemia    Osteoarthritis    Carotid artery occlusion  left side in November 2013  Left CEA 11/2013    Arthropathy  left hip &gt; right hip    Colon cancer  had surgery in March 2014 with mets to liver-- received chemotherapy    Liver cancer  METS from the colon -- has received chemotherapy    Depression    Reflux    Clostridium difficile infection  on short term vancomycin    Snoring    Lumbar stenosis    DJD (degenerative joint disease)    Atherosclerosis    Cataract of left eye    Lung metastasis    S/P carotid endarterectomy  11/2013  left side    H/O skin graft  1984    Colon cancer  diagnosed in March 2014 with subsequent colon cancer followed with chemotherapy    Colon cancer  [facility] 2014 for chemotherapy due to colon cancer with METS to liver    H/O resection of liver  2015      HEALTH ISSUES - PROBLEM Dx:  Jaundice    Hyperbilirubinemia    Adenocarcinoma of colon metastatic to liver    Acute kidney injury    Essential hypertension    Prophylactic measure    ACP (advance care planning)    Hyperkalemia      MEDICATIONS  (STANDING):  calcium gluconate IVPB 1 Gram(s) IV Intermittent once  heparin   Injectable 5000 Unit(s) SubCutaneous every 8 hours  sodium polystyrene sulfonate Suspension 30 Gram(s) Oral once    MEDICATIONS  (PRN):  ondansetron Injectable 4 milliGRAM(s) IV Push every 8 hours PRN Nausea and/or Vomiting    Allergies    No Known Allergies    FAMILY HISTORY:  Family history of colon cancer (Sibling)    Family history of stomach cancer (Sibling)        SOCIAL HISTORY: No EtOH, no tobacco    REVIEW OF SYSTEMS:    CONSTITUTIONAL: No fevers or chills  EYES/ENT: No visual changes;  No vertigo or throat pain   NECK: No pain or stiffness  RESPIRATORY: No cough, wheezing, hemoptysis; No shortness of breath  CARDIOVASCULAR: No chest pain or palpitations  GASTROINTESTINAL: No nausea, vomiting, or hematemesis; No melena or hematochezia.  GENITOURINARY: No hematuria  NEUROLOGICAL: No numbness   SKIN: No itching, burning, rashes, or lesions   All other review of systems is negative unless indicated above.    Height (cm): 167.6 (08-09 @ 16:54)  Weight (kg): 61.507 (08-09 @ 16:54)  BMI (kg/m2): 21.9 (08-09 @ 16:54)  BSA (m2): 1.7 (08-09 @ 16:54)    T(F): 97.5 (08-10-21 @ 05:25), Max: 98 (08-09-21 @ 16:27)  HR: 104 (08-10-21 @ 05:25)  BP: 118/50 (08-10-21 @ 05:25)  RR: 18 (08-10-21 @ 05:25)  SpO2: 97% (08-10-21 @ 05:25)    GENERAL: NAD, well-developed  HEAD:  Atraumatic, Normocephalic  EYES: EOMI, PERRLA, conjunctiva and sclera icteric  NECK: Supple, No JVD  CHEST/LUNG: Clear to auscultation; No wheeze ant.  HEART: Regular rate and rhythm; No murmurs, rubs, or gallops  ABDOMEN: Softly distended, Nontender, Bowel sounds present  EXTREMITIES:  1+pedal edema; no calf tenderness  NEUROLOGY: awake, alert  SKIN: No rashes       Labs:             10.9   18.67 )-----------( 298      ( 08-10 @ 05:50 )             36.8                10.3   12.23 )-----------( 241      ( 08-09 @ 11:20 )             33.2       08-10    136  |  102  |  64<H>  ----------------------------<  40<LL>  6.6<HH>   |  11<L>  |  2.38<H>    Ca    9.5      10 Aug 2021 05:50    TPro  6.2  /  Alb  2.3<L>  /  TBili  11.2<H>  /  DBili  x   /  AST  3023<H>  /  ALT  845<H>  /  AlkPhos  562<H>  08-10      PT/INR - ( 09 Aug 2021 11:20 )   PT: 16.9 sec;   INR: 1.42 ratio         PTT - ( 09 Aug 2021 11:20 )  PTT:31.3 sec      Consultant notes reviewed : YES [x ] ; NO [ ]  Case discussed with attending physician / consultant: YES [ x] ; NO [ ]    Radiology and additional tests:  < from: CT Abdomen and Pelvis No Cont (08.09.21 @ 14:23) >  IMPRESSION:  Significantly limited by lack of IV contrast.  No specific acute inflammatoryor obstructive pathology.  Extensive hepatic metastases grossly similar to prior.  Normal volume abdominal pelvic ascites slightly changed. Cannot exclude malignant ascites.  Borderline to minimally enlarged retroperitoneal lymph nodes similar to prior possibly metastatic.  Additional findings as discussed    < end of copied text >  < from: CT Chest w/ IV Cont (07.29.21 @ 09:31) >  IMPRESSION:  Interval development of numerous hepatic metastases and possibly splenic metastases.    Small volume ascites and possible carcinomatosis.      < end of copied text >

## 2021-08-10 NOTE — CONSULT NOTE ADULT - ATTENDING COMMENTS
Patient seen and examined with Komal Wilkes NP.  Agree with assessment and plan as above.    Elderly male with history of metastatic colon cancer or liver now with jaundice and elevated liver tests.  No abdominal pain, fever, nausea or vomiting.  Imaging does not show any biliary obstruction.    VSS.  Abdomen soft, nontender to palpation    Supportive care  No role for biliary stent
patient seen and examined    patient need urgent HD  doubt acute infection  renal consult called  GI consult called.   d/w family about acute hyperkalemia issue.   will need palliative care to discuss over all poor prognosis , multiple GOC discussions had with family today by hospitalist team and they wanted full code.   suspect metastatic cancer to liver has lead to liver disfunction and severe lactic acidosis.     will follow

## 2021-08-10 NOTE — SWALLOW BEDSIDE ASSESSMENT ADULT - SLP GENERAL OBSERVATIONS
Alert and oriented x 3. Difficulty following basic directions, however, made wants/needs known. Patient reported nausea and lethargy. RN and MD were present.

## 2021-08-10 NOTE — CONSULT NOTE ADULT - PROBLEM SELECTOR RECOMMENDATION 9
most likely due to liver cancer  no obstructing mass noted so CBD stent not indicated and would contribute no benefit   Supportive care  Discussed with patient, son, and wife at bedside

## 2021-08-10 NOTE — SWALLOW BEDSIDE ASSESSMENT ADULT - ORAL PREPARATORY PHASE
Within functional limits Decreased mastication ability Decreased mastication ability/Bolus falls into left lateral sulci/Bolus falls into right lateral sulci

## 2021-08-10 NOTE — CONSULT NOTE ADULT - ASSESSMENT
70yo male with hx of Metastatic Colon Adenocarcinoma diagnosed 10years ago subsequently in remission, HTN, symptomatic COVID-19 infection in early 2021, who presented to the ED with complaints of jaundice associated with b/l LE swelling. Pt was noted on routine follow up to be significantly anemic a few weeks PTA, s/p PRBC transfusion. Since the transfusion, pt noted LE swelling and increased abdominal girth.  He denied any pain or discomfort. Denied cp, palpitations, sob, abd pain, N/V, fever, chills.   Of note, pt is scheduled to see his oncologist, Dr. Motley, this week to follow up on his CT scan he recently had done.

## 2021-08-10 NOTE — PROVIDER CONTACT NOTE (HYPOGLYCEMIA EVENT) - NS PROVIDER CONTACT SITUATION-HYPO
Patient not diabetic. Serum glucose was 40, patient asymptomatic. POCT revealed BG 50. Md Thomas notified verbally on floor.

## 2021-08-10 NOTE — CONSULT NOTE ADULT - PROBLEM SELECTOR RECOMMENDATION 5
From Liver failure  no evidence of acute infection  doubt sepsis as not hypotensive or show signs and symptoms   with such severe acidosis likely cause of hyperkalemia  likely due to severe metastatic liver disease and wosening jaundice

## 2021-08-10 NOTE — SWALLOW BEDSIDE ASSESSMENT ADULT - SLP PERTINENT HISTORY OF CURRENT PROBLEM
70yo male with hx of Metastatic Colon Adenocarcinoma diagnosed 10years ago was in remission, HTN, symptomatic COVID-19 infection in early 2021, presented to the ED with complaints of jaundice associated with b/l LE Swelling. Pt accompanied by his wife who is also providing hx. Per both of them, pt was noted on routine follow up to be significant anemic a few weeks ago. He was scheduled for 2unit PRBC transfusion. Since the transfusion, pt states he has noted b/l le swelling as well as his belly. He denies any pain or discomfort. Pt or family cannot recall how long he has been noted to be yellow. He does endorse decrease in appetite. Denies cp, palpitations, sob, abd pain, N/V, fever, chills. Of note, pt is scheduled to see his oncologist, Dr. Banks, in 2 days to follow up on his CT scan he recently had done to assess for recurrence of his cancer.

## 2021-08-10 NOTE — SWALLOW BEDSIDE ASSESSMENT ADULT - ORAL PHASE
Decreased anterior-posterior movement of the bolus/Delayed oral transit time/Stasis in lateral sulci/Palatal stasis/Lingual stasis Decreased anterior-posterior movement of the bolus Decreased anterior-posterior movement of the bolus/Delayed oral transit time

## 2021-08-10 NOTE — CONSULT NOTE ADULT - PROBLEM SELECTOR RECOMMENDATION 9
with hyperkalemia refractory to pharmacologic treatment, metabolic acidosis and T-wave changes on EKG.  Patient will require urgent HD today, renal consulted.  Beltran with strict I&O's, monitor electrolytes with hyperkalemia refractory to pharmacologic treatment, metabolic acidosis and T-wave changes on EKG.  Patient will require urgent HD today, renal consulted.  Beltran with strict I&O's, monitor electrolytes  - Start bicarb drip  - s/p calcium, d10, insulin, Kayexalate  -

## 2021-08-10 NOTE — CONSULT NOTE ADULT - SUBJECTIVE AND OBJECTIVE BOX
NEPHROLOGY CONSULTATION    CHIEF COMPLAINT: jaundice associated with b/l LE Swelling    HPI:  Pt is 72 yo male with hx of Metastatic Colon Adenocarcinoma diagnosed 10 years ago, HTN, symptomatic COVID-19 infection in early , presented to the ED with complaints of jaundice associated with b/l LE Swelling. Pt was noted on routine follow up to be significant anemic a few weeks ago. He was scheduled for 2unit PRBC transfusion. No cp, palpitations, sob, abd pain, N/V, fever, chills on adm 21. Today noted to have KARYNA w/hyperkalemia and AG metabolic acidosis. Transferred to ICU for RRT.    ROS:  as above    Allergies:  No Known Allergies    PAST MEDICAL & SURGICAL HISTORY:  Hypertension  CVA (cerebral vascular accident)   -- wife and patient state no residual  Hyperlipidemia  Osteoarthritis  Carotid artery occlusion  left side in 2013  Left CEA 2013  Arthropathy  left hip &gt; right hip  Colon cancer  had surgery in 2014 with mets to liver-- received chemotherapy  Depression  sts was depressed &quot;I lost my brother while going to this&quot;  Reflux  Clostridium difficile infection  on short term vancomyacin  DREW precautions -- responds affirmatively to STOP BANG questionnaire -- admits to loud snoring; age &gt; 50; h/o htn; gender, male; h/o htn  Lumbar stenosis  DJD (degenerative joint disease)  Atherosclerosis  Cataract of left eye  Lung metastasis  S/P carotid endarterectomy  2013  left side  H/O skin graft    H/O resection of liver      SOCIAL HISTORY:  negative    FAMILY HISTORY:  Family history of colon cancer (Sibling)  Family history of stomach cancer (Sibling)    MEDICATIONS  (STANDING):  dextrose 50% Injectable 50 milliLiter(s) IV Push once  heparin   Injectable 5000 Unit(s) SubCutaneous every 8 hours  insulin regular  human recombinant 10 Unit(s) IV Push once  sodium bicarbonate  Infusion 0.366 mEq/kG/Hr (150 mL/Hr) IV Continuous <Continuous>  sodium bicarbonate  Injectable 100 milliEquivalent(s) IV Push once    Vital Signs Last 24 Hrs  T(C): 36.4 (08-10-21 @ 05:25), Max: 36.7 (21 @ 16:27)  T(F): 97.5 (08-10-21 @ 05:25), Max: 98 (21 @ 16:27)  HR: 104 (08-10-21 @ 05:25) (79 - 104)  BP: 118/50 (08-10-21 @ 05:25) (111/71 - 130/58)  BP(mean): 77 (21 @ 16:27) (77 - 77)  RR: 18 (08-10-21 @ 05:25) (14 - 18)  SpO2: 97% (08-10-21 @ 05:25) (96% - 98%)    LABS:                        10.9   18.67 )-----------( 298      ( 10 Aug 2021 05:50 )             36.8     08-10    132<L>  |  97  |  66<H>  ----------------------------<  309<H>  7.3<HH>   |  <5<LL>  |  2.70<H>    Ca    8.9      10 Aug 2021 08:50    TPro  5.1<L>  /  Alb  2.0<L>  /  TBili  9.2<H>  /  DBili  8.0<H>  /  AST  x   /  ALT  940<H>  /  AlkPhos  479<H>  0810    Urinalysis Basic - ( 09 Aug 2021 13:05 )    Color: Montserrat / Appearance: Clear / S.020 / pH: x  Gluc: x / Ketone: Negative  / Bili: Moderate / Urobili: 8   Blood: x / Protein: 30 mg/dL / Nitrite: Negative   Leuk Esterase: Trace / RBC: 0-4 /HPF / WBC 0-2 /HPF   Sq Epi: x / Non Sq Epi: Neg.-Few / Bacteria: Trace /HPF    LIVER FUNCTIONS - ( 10 Aug 2021 08:50 )  Alb: 2.0 g/dL / Pro: 5.1 g/dL / ALK PHOS: 479 U/L / ALT: 940 U/L / AST: x     / GGT: x           PT/INR - ( 09 Aug 2021 11:20 )   PT: 16.9 sec;   INR: 1.42 ratio      PTT - ( 09 Aug 2021 11:20 )  PTT:31.3 sec  Urinalysis Basic - ( 09 Aug 2021 13:05 )    Color: Montserrat / Appearance: Clear / S.020 / pH: x  Gluc: x / Ketone: Negative  / Bili: Moderate / Urobili: 8   Blood: x / Protein: 30 mg/dL / Nitrite: Negative   Leuk Esterase: Trace / RBC: 0-4 /HPF / WBC 0-2 /HPF   Sq Epi: x / Non Sq Epi: Neg.-Few / Bacteria: Trace /HPF    A/P:    To start RRT once access is in place  Full consult to follow  D/w ICU team    [phone] NEPHROLOGY CONSULTATION    CHIEF COMPLAINT: jaundice associated with b/l LE Swelling    HPI:  Pt is 72 yo male with hx of Metastatic Colon Adenocarcinoma diagnosed 10 years ago, HTN, symptomatic COVID-19 infection in early , presented to the ED with complaints of jaundice associated with b/l LE Swelling. Pt was noted on routine follow up to be significant anemic a few weeks ago. He was scheduled for 2unit PRBC transfusion. No cp, palpitations, sob, abd pain, N/V, fever, chills on adm 21. Today noted to have KARYNA w/hyperkalemia and AG metabolic acidosis, severe lactic acidosis. Transferred to ICU for RRT. Awake, alert, denies CP, c/o SOB, no N/V/D, no F/C. Beltran w/poor UO.    ROS:  as above    Allergies:  No Known Allergies    PAST MEDICAL & SURGICAL HISTORY:  Hypertension  CVA (cerebral vascular accident)   -- wife and patient state no residual  Hyperlipidemia  Osteoarthritis  Carotid artery occlusion  left side in 2013  Left CEA 2013  Arthropathy  left hip &gt; right hip  Colon cancer  had surgery in 2014 with mets to liver-- received chemotherapy  Depression  sts was depressed &quot;I lost my brother while going to this&quot;  Reflux  Clostridium difficile infection  on short term vancomyacin  DREW precautions -- responds affirmatively to STOP BANG questionnaire -- admits to loud snoring; age &gt; 50; h/o htn; gender, male; h/o htn  Lumbar stenosis  DJD (degenerative joint disease)  Atherosclerosis  Cataract of left eye  Lung metastasis  S/P carotid endarterectomy  2013  left side  H/O skin graft    H/O resection of liver      SOCIAL HISTORY:  negative    FAMILY HISTORY:  Family history of colon cancer (Sibling)  Family history of stomach cancer (Sibling)    MEDICATIONS  (STANDING):  dextrose 50% Injectable 50 milliLiter(s) IV Push once  heparin   Injectable 5000 Unit(s) SubCutaneous every 8 hours  insulin regular  human recombinant 10 Unit(s) IV Push once  sodium bicarbonate  Infusion 0.366 mEq/kG/Hr (150 mL/Hr) IV Continuous <Continuous>  sodium bicarbonate  Injectable 100 milliEquivalent(s) IV Push once    Vital Signs Last 24 Hrs  T(C): 36.4 (08-10-21 @ 05:25), Max: 36.4 (08-10-21 @ 05:25)  T(F): 97.5 (08-10-21 @ 05:25), Max: 97.5 (08-10-21 @ 05:25)  HR: 93 (08-10-21 @ 16:00) (86 - 107)  BP: 95/37 (08-10-21 @ 16:00) (95/37 - 125/40)  BP(mean): 44 (08-10-21 @ 16:00) (44 - 63)  RR: 17 (08-10-21 @ 16:00) (17 - 30)  SpO2: 99% (08-10-21 @ 16:00) (97% - 100%)    I&O's Detail    10 Aug 2021 07:01  -  10 Aug 2021 17:24  --------------------------------------------------------  IN:    Sodium Bicarbonate: 1050 mL  Total IN: 1050 mL    OUT:    Indwelling Catheter - Urethral (mL): 160 mL  Total OUT: 160 mL    Total NET: 890 mL    card - s1s2  lungs - b/l air entry  abd - soft, ND  extr - no edema  skin - jaundice    LABS:                        10.9   18.67 )-----------( 298      ( 10 Aug 2021 05:50 )             36.8     08-10    132<L>  |  97  |  66<H>  ----------------------------<  309<H>  7.3<HH>   |  <5<LL>  |  2.70<H>    Ca    8.9      10 Aug 2021 08:50    TPro  5.1<L>  /  Alb  2.0<L>  /  TBili  9.2<H>  /  DBili  8.0<H>  /  AST  x   /  ALT  940<H>  /  AlkPhos  479<H>  08-10    Urinalysis Basic - ( 09 Aug 2021 13:05 )    Color: Montserrat / Appearance: Clear / S.020 / pH: x  Gluc: x / Ketone: Negative  / Bili: Moderate / Urobili: 8   Blood: x / Protein: 30 mg/dL / Nitrite: Negative   Leuk Esterase: Trace / RBC: 0-4 /HPF / WBC 0-2 /HPF   Sq Epi: x / Non Sq Epi: Neg.-Few / Bacteria: Trace /HPF    LIVER FUNCTIONS - ( 10 Aug 2021 08:50 )  Alb: 2.0 g/dL / Pro: 5.1 g/dL / ALK PHOS: 479 U/L / ALT: 940 U/L / AST: x     / GGT: x           PT/INR - ( 09 Aug 2021 11:20 )   PT: 16.9 sec;   INR: 1.42 ratio      PTT - ( 09 Aug 2021 11:20 )  PTT:31.3 sec  Urinalysis Basic - ( 09 Aug 2021 13:05 )    Color: Montserrat / Appearance: Clear / S.020 / pH: x  Gluc: x / Ketone: Negative  / Bili: Moderate / Urobili: 8   Blood: x / Protein: 30 mg/dL / Nitrite: Negative   Leuk Esterase: Trace / RBC: 0-4 /HPF / WBC 0-2 /HPF   Sq Epi: x / Non Sq Epi: Neg.-Few / Bacteria: Trace /HPF    A/P:    End stage metastatic colon ca  Liver failure, renal failure in setting of above  Lactic acidosis  Notes reviewed, patient is not a candidate for further cancer treatment  Prognosis is very poor overall  Long d/w pt and family  Patient and family are aware of poor prognosis, considering comfort care, but not ready to make a decision and would like a trial of HD  HD daily as tolerates  Close f/u of lytes, UO  Avoid nephrotoxins, hepatotoxins  Avoid hypotension  NPO, IVF w/Bicarb  Pan-cx  D/w ICU team    [phone]

## 2021-08-10 NOTE — CONSULT NOTE ADULT - SUBJECTIVE AND OBJECTIVE BOX
INTERVAL HPI/OVERNIGHT EVENTS:  HPI:    70 y/o male pmh colon ca with mets to liver and lung (), colon, lung, and liver resection, HTN, COVID  who is admitted with increased lethargy and jaundice. Patient seen and examined at bedside. Patients son states he was doing well and has been in remission for . Patient sone states patient developed increased lethargy over the last month and became jaundice. He had follow up blood work with PCP, Dr. Figueroa, and was noted to have abnormal blood work. A recent follow up CT showed liver lesions. He was too see his oncologist this week but got progessively worse so he presented to ER. Patient denies abdominal pain, nausea, vomiting, fever, chills or BRBPR. He does not appear to be confused. He complains of decreased appetite.       MEDICATIONS  (STANDING):  chlorhexidine 4% Liquid 1 Application(s) Topical <User Schedule>  dextrose 50% Injectable 50 milliLiter(s) IV Push once  sodium bicarbonate  Infusion 0.366 mEq/kG/Hr (150 mL/Hr) IV Continuous <Continuous>    MEDICATIONS  (PRN):  ondansetron Injectable 4 milliGRAM(s) IV Push every 8 hours PRN Nausea and/or Vomiting  sodium chloride 0.9% lock flush 10 milliLiter(s) IV Push every 1 hour PRN Pre/post blood products, medications, blood draw, and to maintain line patency      Allergies    No Known Allergies    Intolerances        PAST MEDICAL & SURGICAL HISTORY:  Hypertension    CVA (cerebral vascular accident)   -- wife and patient state no residual    Hyperlipidemia    Osteoarthritis    Carotid artery occlusion  left side in 2013  Left CEA 2013    Arthropathy  left hip &gt; right hip    Colon cancer  had surgery in 2014 with mets to liver-- received chemotherapy    Liver cancer  METS from the colon -- has received chemotherapy    Depression  sts was depressed &quot;I lost my brother while going to this&quot;    Reflux    Clostridium difficile infection  on short term vancomyacin    Snoring  DREW precautions -- responds affirmatively to STOP BANG questionnaire -- admits to loud snoring; age &gt; 50; h/o htn; gender, male; h/o htn    Lumbar stenosis    DJD (degenerative joint disease)    Atherosclerosis    Cataract of left eye    Lung metastasis    S/P carotid endarterectomy  2013  left side    H/O skin graft  1984    Colon cancer  diagnosed in 2014 with subsequent colon cancer followed with chemotherapy    Colon cancer  infusaport  for chemotherapy due to colon cancer with METS to liver    H/O resection of liver          REVIEW OF SYSTEMS: negative unless indicated in HPI    Non smoker   No ETOH abuse     PHYSICAL EXAM:   Vital Signs:  Vital Signs Last 24 Hrs  T(C): 36.4 (10 Aug 2021 05:25), Max: 36.7 (09 Aug 2021 16:27)  T(F): 97.5 (10 Aug 2021 05:25), Max: 98 (09 Aug 2021 16:27)  HR: 104 (10 Aug 2021 05:25) (79 - 104)  BP: 118/50 (10 Aug 2021 05:25) (111/71 - 130/58)  BP(mean): 77 (09 Aug 2021 16:27) (77 - 77)  RR: 18 (10 Aug 2021 05:25) (14 - 18)  SpO2: 97% (10 Aug 2021 05:25) (96% - 98%)  Daily Height in cm: 167.64 (09 Aug 2021 16:54)    Daily I&O's Summary      GENERAL:  Appears stated age,   HEENT:  NC/AT,  conjunctivae clear and pink, sclera +icterus  CHEST:  Full & symmetric excursion, + increased effort, breath sounds clear  HEART:  Regular rhythm, S1, S2,  ABDOMEN:  Soft, non-tender,distended, normoactive bowel sounds,   EXTEREMITIES:  no  edema  SKIN:  No rash/warm/dry/ jaundice   NEURO:  Alert, oriented, no asterixis, no tremor, no encephalopathy      LABS:                        10.9   18.67 )-----------( 298      ( 10 Aug 2021 05:50 )             36.8     08-10    132<L>  |  97  |  66<H>  ----------------------------<  309<H>  7.3<HH>   |  <5<LL>  |  2.70<H>    Ca    8.9      10 Aug 2021 08:50    TPro  5.1<L>  /  Alb  2.0<L>  /  TBili  9.2<H>  /  DBili  8.0<H>  /  AST  4252<H>  /  ALT  940<H>  /  AlkPhos  479<H>  08-10    PT/INR - ( 10 Aug 2021 13:15 )   PT: 31.4 sec;   INR: 2.72 ratio         PTT - ( 10 Aug 2021 13:15 )  PTT:35.5 sec  Urinalysis Basic - ( 09 Aug 2021 13:05 )    Color: Montserrat / Appearance: Clear / S.020 / pH: x  Gluc: x / Ketone: Negative  / Bili: Moderate / Urobili: 8   Blood: x / Protein: 30 mg/dL / Nitrite: Negative   Leuk Esterase: Trace / RBC: 0-4 /HPF / WBC 0-2 /HPF   Sq Epi: x / Non Sq Epi: Neg.-Few / Bacteria: Trace /HPF    Lipase, Serum: 284 U/L ( @ 11:20)    RADIOLOGY & ADDITIONAL TESTS:    EXAM:  CT ABDOMEN AND PELVIS      PROCEDURE DATE:  2021        INTERPRETATION:  CLINICAL INFORMATION: Leg swelling. Colon cancer    COMPARISON: 2021.    CONTRAST/COMPLICATIONS:  IV Contrast: NONE  Oral Contrast: NONE  Complications: None reported at time of study completion    PROCEDURE:  CT of the Abdomen and Pelvis was performed.  Sagittal and coronal reformats were performed.    FINDINGS:  Lack of IV contrast limits evaluation vascular structures and solid organ parenchyma  LOWER CHEST: Small layering right pleural effusion slightly increased with right basilar atelectatic changes..    LIVER: Multifocal intrahepatic hypoattenuating lesions consistent with metastatic disease, not well defined on this noncontrast study..  BILE DUCTS: Normal caliber.  GALLBLADDER: Within normal limits.  SPLEEN: Previously described hypodense foci not well defined on this noncontrast.  PANCREAS: Within normal limits.  ADRENALS: Within normal limits.  KIDNEYS/URETERS: Within normal limits.    BLADDER: Within normal limits.  REPRODUCTIVE ORGANS: Normal prostate    BOWEL: No bowel obstruction or inflammation. Bowel anastomosis midabdomen. Appendix no appendicitis  PERITONEUM: Small volume abdominal pelvic ascites not significant changed. Cannot exclude malignant ascites  VESSELS: Nonaneurysmal. The portal vein and remainder of vascular structures not adequately assessed on this noncontrast study.  RETROPERITONEUM/LYMPH NODES: Borderline to minimally enlarged retroperitoneal para-aortic paracaval lymph nodes similar to prior possiblly metastatic.  ABDOMINAL WALL: Within normal limits.  BONES: Stable nonspecific sclerotic focus right L5 pedicle    IMPRESSION:  Significantly limited by lack of IV contrast.  No specific acute inflammatory or obstructive pathology.  Extensive hepatic metastases grossly similar to prior.  Normal volume abdominal pelvic ascites slightly changed. Cannot exclude malignant ascites.  Borderline to minimally enlarged retroperitoneal lymph nodes similar to prior possibly metastatic.  Additional findings as discussed

## 2021-08-10 NOTE — CONSULT NOTE ADULT - SUBJECTIVE AND OBJECTIVE BOX
Source:  Patient, family, and chart  Reliability:  Good    CC: "My legs are getting swollen"  71 year old prior COVID positive male with extensive PMH including metastatic colon adenocarcinoma with reoccurrence in , HTN, CVA, dyslipidemia, OA, DREW, GERD, depression who presented to the ED at [facility] yesterday with complaints of jaundice and associated with bilateral LE swelling.  Patient was noted on routine follow up to be significantly anemic a few weeks ago and transfused 2 units PRBC. Since the transfusion, patient states he has noted progressive swelling in both his lower extremities and his abdomen.  Family and patient unsure of onset of jaundice.  Denied abdominal pain, chest pain, nausea, vomiting, change in bowel habits, change in urinary frequency, dysuria, fevers, chills, palpitations, irregular HR, orthopnea, change in weight    On evaluation in ED found to be hyperkalemic, was refractory to pharmacological treatment.  Progressively hyperkalemic and acidotic, Now transferred to ICU for further care and management.    Patient currently states that he did not rest well last night because he felt SOB and could not get comfortable in bed, otherwise offers no complaints at this time.    PMH  Hypertension  Colon Cancer with liver metastasis 2014 treated with surgery and chemotherapy  CVA (cerebral vascular accident)  - no residual  Hyperlipidemia  Osteoarthritis  GERD  Depression  DREW  Prior C. diff infection  OA, lumbar stenosis    PSH  2013  left side carotid endarterectomy   H/O skin graft  2014 Hemicolectomy for colon cancer with [id number] placement   Liver resection    Allergies  No known allergies or intolerances    Current Medications  dextrose 50% Injectable 50 milliLiter(s) IV Push once  heparin   Injectable 5000 Unit(s) SubCutaneous every 8 hours  insulin regular  human recombinant 10 Unit(s) IV Push once  sodium bicarbonate  Infusion 0.366 mEq/kG/Hr (150 mL/Hr) IV Continuous <Continuous>  sodium bicarbonate  Injectable 100 milliEquivalent(s) IV Push once  ondansetron Injectable 4 milliGRAM(s) IV Push every 8 hours PRN Nausea and/or Vomiting    Home Medications  aspirin 81 mg oral tablet: 1 tab(s) orally once a day - last dose 2016 (09 Aug 2021 16:07)  metoprolol succinate 25 mg oral tablet, extended release: 1 tab(s) orally once a day (09 Aug 2021 16:07)  Vitamin B12:  (09 Aug 2021 16:07)  Vitamin C: 1 tab(s) orally once a day (09 Aug 2021 16:07)  Vitamin D3: 1 tab(s) orally once a day (09 Aug 2021 16:07)    Psoc  Quit cigarette smoking   No recent ETOH use  Lives in private home with wife    Pfam    Family history of colon cancer (Sibling)    Family history of stomach cancer (Sibling)    Review of Systems  CONSTITUTIONAL: See HPI  ENT: Denies dysphagia, difficulty chewing, tinnitus, blurred vision, double vision  RESPIRATORY: Denies cough, wheeze, hemoptysis, shortness of breath  CARDIOVASCULAR: Denies chest pain, palpitations irregular HR  GASTROINTESTINAL:  See HPI  GENITOURINARY: Denies dysuria, hematuria, urinary frequency, urinary incontinence  NEUROLOGICAL: Denies headaches, syncope, near syncope, dizziness, lightheadedness, headaches, seizures  SKIN: Denies rashes, masses, bruising, lesions   MUSCULOSKELETAL: Denies history of OA or gout, joint swelling  PSYCHIATRIC: Denies , anxiety, mood swings, or difficulty sleeping      Labs                        10.9   18.67 )-----------( 298      ( 10 Aug 2021 05:50 )             36.8     132<L>  |  97  |  66<H>  ----------------------------<  309<H>  7.3<HH>   |  <5<LL>  |  2.70<H>    Ca    8.9      10 Aug 2021 08:50  TPro  6.2  /  Alb  2.3<L>  /  TBili  11.2<H>  /  DBili  x   /  AST  3023<H>  /  ALT  845<H>  /  AlkPhos  562<H>  10    Glucose Trend  08-10-21 @ 09:02   -  -- -- 162<H>  08-10-21 @ 08:50   -  -- 309<H> --  08-10-21 @ 08:28   -  -- -- 50<LL>  08-10-21 @ 05:50   -  -- 40<LL> --  21 @ 11:20   -  -- 138<H> --    ABG - ( 10 Aug 2021 10:05 )  pH, Arterial: 7.09  pH, Blood: x     /  pCO2: <19   /  pO2: 110   / HCO3: 3     / Base Excess: -26.5 /  SaO2: 97.8      Urinalysis Basic - ( 09 Aug 2021 13:05 )    Color: Montserrat / Appearance: Clear / S.020 / pH: x  Gluc: x / Ketone: Negative  / Bili: Moderate / Urobili: 8   Blood: x / Protein: 30 mg/dL / Nitrite: Negative   Leuk Esterase: Trace / RBC: 0-4 /HPF / WBC 0-2 /HPF   Sq Epi: x / Non Sq Epi: Neg.-Few / Bacteria: Trace /HPF    Respiratory Viral Panel with COVID-19 by KAHLIL (21 @ 12:00)   Rapid RVP Result: NotDetec   SARS-CoV-2: NotDetec: This Respiratory Panel uses polymerase chain reaction (PCR) to detect for   adenovirus; coronavirus (HKU1, NL63, 229E, OC43); human metapneumovirus   (hMPV); human enterovirus/rhinovirus (Entero/RV); influenza A; influenza   A/H1; influenza A/H3; influenza A/H1-2009; influenza B; parainfluenza   viruses 1, 2, 3, 4; respiratory syncytial virus; Mycoplasma pneumoniae;   Chlamydophila pneumoniae; and SARS-CoV-2.   Adenovirus (RapRVP): NotDetec   Influenza A (RapRVP): NotDetec   Influenza AH1  (RapRVP): NotDetec   Influenza AH1 (RapRVP): NotDetec   Influenza AH3 (RapRVP): NotDetec   Influenza B (RapRVP): NotDetec   Parainfluenza 1 (RapRVP): NotDetec   Parainfluenza 2 (RapRVP): NotDetec   Parainfluenza 3 (RapRVP): NotDetec   Parainfluenza 4 (RapRVP): NotDetec   Resp Syncytial Virus (RapRVP): NotDetec   Chlamydia pneumoniae (RapRVP): NotDetec   Mycoplasma pneumoniae (RapRVP): NotDetec   Entero/Rhinovirus (RapRVP): NotDetec   hMPV (RapRVP): NotDetec   Coronavirus (229E,HKU1,NL63,OC43): NotDetec   Radiology    EXAM:  CT ABDOMEN AND PELVIS    PROCEDURE DATE:  2021    INTERPRETATION:  CLINICAL INFORMATION: Leg swelling. Colon cancer  COMPARISON: 2021.  CONTRAST/COMPLICATIONS:  IV Contrast: NONE  Oral Contrast: NONE  Complications: Nonereported at time of study completion    PROCEDURE:  CT of the Abdomen and Pelvis was performed.  Sagittal and coronal reformats were performed.    FINDINGS:  Lack of IV contrast limits evaluation vascular structures and solid organ parenchyma  LOWER CHEST: Small layering right pleural effusion slightly increased with right basilar atelectatic changes..    LIVER: Multifocal intrahepatic hypoattenuating lesions consistent with metastatic disease, not well defined on this noncontrast study..  BILE DUCTS: Normal caliber.  GALLBLADDER: Within normal limits.  SPLEEN: Previously described hypodense foci not well defined on this noncontrast.  PANCREAS: Within normal limits.  ADRENALS: Within normal limits.  KIDNEYS/URETERS: Within normal limits.    BLADDER: Within normal limits.  REPRODUCTIVE ORGANS: Normal prostate    BOWEL: No bowel obstruction or inflammation. Bowel anastomosis midabdomen. Appendix no appendicitis  PERITONEUM: Small volume abdominal pelvic ascites not significant changed. Cannot exclude malignant ascites  VESSELS: Nonaneurysmal. The portal vein and remainder of vascular structures not adequately assessed on this noncontrast study.  RETROPERITONEUM/LYMPH NODES: Borderline to minimally enlarged retroperitoneal para-aortic paracaval lymph nodes similar to prior possiblly metastatic.  ABDOMINAL WALL: Within normal limits.  BONES: Stable nonspecific sclerotic focus right L5 pedicle    IMPRESSION:  Significantly limited by lack of IV contrast.  No specific acute inflammatoryor obstructive pathology.  Extensive hepatic metastases grossly similar to prior.  Normal volume abdominal pelvic ascites slightly changed. Cannot exclude malignant ascites.  Borderline to minimally enlarged retroperitoneal lymph nodes similar to prior possibly metastatic.  Additional findings as discussed    SOBIA GOEL MD; Attending Radiologist  This document has been electronically signed. Aug  9 2021  2:51PM      Vital Signs Last 24 Hrs  T(C): 36.4 (10 Aug 2021 05:25), Max: 36.7 (09 Aug 2021 16:27)  T(F): 97.5 (10 Aug 2021 05:25), Max: 98 (09 Aug 2021 16:27)  HR: 104 (10 Aug 2021 05:25) (79 - 104)  BP: 118/50 (10 Aug 2021 05:25) (111/71 - 146/71)  BP(mean): 77 (09 Aug 2021 16:27) (77 - 77)  RR: 18 (10 Aug 2021 05:25) (14 - 18)  SpO2: 97% RA (10 Aug 2021 05:25) (96% - 98%)    Physical Exam  Gen:  WN/WD Male resting in bed in mild respiratory distress  ENT:  NC/AT,  Sclera icteric,  no JVD noted  Thorax:  Symmetric, no retractions  Lung:  CTA b/l  CV:  S1, S2. RRR  Abd:  Softly distended, NT/ND.  BS normoactive, no masses to palp  Extrem:  +1 edema in LE's bilaterally DP/radial pulses +2  Neuro:  A&O x 3, no gross motor/sensory deficits   Source:  Patient, family, and chart  Reliability:  Good    CC: "My legs are getting swollen"  71 year old prior COVID positive male with extensive PMH including metastatic colon adenocarcinoma with reoccurrence in , HTN, CVA, dyslipidemia, OA, DREW, GERD, depression who presented to the ED at [facility] yesterday with complaints of jaundice and associated with bilateral LE swelling.  Patient was noted on routine follow up to be significantly anemic a few weeks ago and transfused 2 units PRBC. Since the transfusion, patient states he has noted progressive swelling in both his lower extremities and his abdomen.  Family and patient unsure of onset of jaundice.  Denied abdominal pain, chest pain, nausea, vomiting, change in bowel habits, change in urinary frequency, dysuria, fevers, chills, palpitations, irregular HR, orthopnea, change in weight    On evaluation in ED found to be hyperkalemic, was refractory to pharmacological treatment.  Progressively hyperkalemic and acidotic, Now transferred to ICU for further care and management.    Patient currently states that he did not rest well last night because he felt SOB and could not get comfortable in bed, otherwise offers no complaints at this time.    PMH  Hypertension  Colon Cancer with liver metastasis 2014 treated with surgery and chemotherapy  CVA (cerebral vascular accident)  - no residual  Hyperlipidemia  Osteoarthritis  GERD  Depression  DREW  Prior C. diff infection  OA, lumbar stenosis    PSH  2013  left side carotid endarterectomy   H/O skin graft  2014 Hemicolectomy for colon cancer with [id number] placement   Liver resection    Allergies  No known allergies or intolerances    Current Medications  dextrose 50% Injectable 50 milliLiter(s) IV Push once  heparin   Injectable 5000 Unit(s) SubCutaneous every 8 hours  insulin regular  human recombinant 10 Unit(s) IV Push once  sodium bicarbonate  Infusion 0.366 mEq/kG/Hr (150 mL/Hr) IV Continuous <Continuous>  sodium bicarbonate  Injectable 100 milliEquivalent(s) IV Push once  ondansetron Injectable 4 milliGRAM(s) IV Push every 8 hours PRN Nausea and/or Vomiting    Home Medications  aspirin 81 mg oral tablet: 1 tab(s) orally once a day - last dose 2016 (09 Aug 2021 16:07)  metoprolol succinate 25 mg oral tablet, extended release: 1 tab(s) orally once a day (09 Aug 2021 16:07)  Vitamin B12:  (09 Aug 2021 16:07)  Vitamin C: 1 tab(s) orally once a day (09 Aug 2021 16:07)  Vitamin D3: 1 tab(s) orally once a day (09 Aug 2021 16:07)    Psoc  Quit cigarette smoking   No recent ETOH use  Lives in private home with wife    Pfam    Family history of colon cancer (Sibling)    Family history of stomach cancer (Sibling)    Review of Systems  CONSTITUTIONAL: See HPI  ENT: Denies dysphagia, difficulty chewing, tinnitus, blurred vision, double vision  RESPIRATORY: Denies cough, wheeze, hemoptysis, shortness of breath  CARDIOVASCULAR: Denies chest pain, palpitations irregular HR  GASTROINTESTINAL:  See HPI  GENITOURINARY: Denies dysuria, hematuria, urinary frequency, urinary incontinence  NEUROLOGICAL: Denies headaches, syncope, near syncope, dizziness, lightheadedness, headaches, seizures  SKIN: Denies rashes, masses, bruising, lesions   MUSCULOSKELETAL: Denies history of OA or gout, joint swelling  PSYCHIATRIC: Denies , anxiety, mood swings, or difficulty sleeping      Labs                        10.9   18.67 )-----------( 298      ( 10 Aug 2021 05:50 )             36.8     132<L>  |  97  |  66<H>  ----------------------------<  309<H>  7.3<HH>   |  <5<LL>  |  2.70<H>    Ca    8.9      10 Aug 2021 08:50  TPro  6.2  /  Alb  2.3<L>  /  TBili  11.2<H>  /  DBili  x   /  AST  3023<H>  /  ALT  845<H>  /  AlkPhos  562<H>  10    Glucose Trend  08-10-21 @ 09:02   -  -- -- 162<H>  08-10-21 @ 08:50   -  -- 309<H> --  08-10-21 @ 08:28   -  -- -- 50<LL>  08-10-21 @ 05:50   -  -- 40<LL> --  21 @ 11:20   -  -- 138<H> --    ABG - ( 10 Aug 2021 10:05 )  pH, Arterial: 7.09  pH, Blood: x     /  pCO2: <19   /  pO2: 110   / HCO3: 3     / Base Excess: -26.5 /  SaO2: 97.8      Urinalysis Basic - ( 09 Aug 2021 13:05 )    Color: Montserrat / Appearance: Clear / S.020 / pH: x  Gluc: x / Ketone: Negative  / Bili: Moderate / Urobili: 8   Blood: x / Protein: 30 mg/dL / Nitrite: Negative   Leuk Esterase: Trace / RBC: 0-4 /HPF / WBC 0-2 /HPF   Sq Epi: x / Non Sq Epi: Neg.-Few / Bacteria: Trace /HPF    Trend Bun/Cr  08-10-21 @ 08:50  BUN/CR -  66<H> / 2.70<H>  08-10-21 @ 05:50  BUN/CR -  64<H> / 2.38<H>  21 @ 11:20  BUN/CR -  50<H> / 1.70<H>  21 @ 06:45  BUN/CR -  -- / 0.96  21 @ 06:15  BUN/CR -  32<H> / 1.37<H>  21 @ 07:16  BUN/CR -  23 / 0.95  21 @ 07:10  BUN/CR -  28<H> / 0.88  01-15-21 @ 05:30  BUN/CR -  31<H> / 0.82  21 @ 07:15  BUN/CR -  37<H> / 0.81  21 @ 07:19  BUN/CR -  37<H> / 0.87  21 @ 06:45  BUN/CR -  -- / 0.96  21 @ 07:07  BUN/CR -  -- / 1.10    Trend Potassium  08-10-21 @ 08:50   -  7.3<HH>  08-10-21 @ 05:50   -  6.6<HH>  21 @ 11:20   -  5.6<H>    Lactic Acid Trend  08-10-21 @ 11:30   -   21.6<HH>    ABG Trend  08-10-21 @ 10:05   - 7.09<LL>/<19<L>/110<H>/97.8    Respiratory Viral Panel with COVID-19 by KAHLIL (21 @ 12:00)   Rapid RVP Result: NotDetec   SARS-CoV-2: NotDetec: This Respiratory Panel uses polymerase chain reaction (PCR) to detect for   adenovirus; coronavirus (HKU1, NL63, 229E, OC43); human metapneumovirus   (hMPV); human enterovirus/rhinovirus (Entero/RV); influenza A; influenza   A/H1; influenza A/H3; influenza A/H1-2009; influenza B; parainfluenza   viruses 1, 2, 3, 4; respiratory syncytial virus; Mycoplasma pneumoniae;   Chlamydophila pneumoniae; and SARS-CoV-2.   Adenovirus (RapRVP): NotDetec   Influenza A (RapRVP): NotDetec   Influenza AH1  (RapRVP): NotDetec   Influenza AH1 (RapRVP): NotDetec   Influenza AH3 (RapRVP): NotDetec   Influenza B (RapRVP): NotDetec   Parainfluenza 1 (RapRVP): NotDetec   Parainfluenza 2 (RapRVP): NotDetec   Parainfluenza 3 (RapRVP): NotDetec   Parainfluenza 4 (RapRVP): NotDetec   Resp Syncytial Virus (RapRVP): NotDetec   Chlamydia pneumoniae (RapRVP): NotDetec   Mycoplasma pneumoniae (RapRVP): NotDetec   Entero/Rhinovirus (RapRVP): NotDetec   hMPV (RapRVP): NotDetec   Coronavirus (229E,HKU1,NL63,OC43): NotDetec   Radiology    EXAM:  CT ABDOMEN AND PELVIS    PROCEDURE DATE:  2021    INTERPRETATION:  CLINICAL INFORMATION: Leg swelling. Colon cancer  COMPARISON: 2021.  CONTRAST/COMPLICATIONS:  IV Contrast: NONE  Oral Contrast: NONE  Complications: Nonereported at time of study completion    PROCEDURE:  CT of the Abdomen and Pelvis was performed.  Sagittal and coronal reformats were performed.    FINDINGS:  Lack of IV contrast limits evaluation vascular structures and solid organ parenchyma  LOWER CHEST: Small layering right pleural effusion slightly increased with right basilar atelectatic changes..    LIVER: Multifocal intrahepatic hypoattenuating lesions consistent with metastatic disease, not well defined on this noncontrast study..  BILE DUCTS: Normal caliber.  GALLBLADDER: Within normal limits.  SPLEEN: Previously described hypodense foci not well defined on this noncontrast.  PANCREAS: Within normal limits.  ADRENALS: Within normal limits.  KIDNEYS/URETERS: Within normal limits.    BLADDER: Within normal limits.  REPRODUCTIVE ORGANS: Normal prostate    BOWEL: No bowel obstruction or inflammation. Bowel anastomosis midabdomen. Appendix no appendicitis  PERITONEUM: Small volume abdominal pelvic ascites not significant changed. Cannot exclude malignant ascites  VESSELS: Nonaneurysmal. The portal vein and remainder of vascular structures not adequately assessed on this noncontrast study.  RETROPERITONEUM/LYMPH NODES: Borderline to minimally enlarged retroperitoneal para-aortic paracaval lymph nodes similar to prior possiblly metastatic.  ABDOMINAL WALL: Within normal limits.  BONES: Stable nonspecific sclerotic focus right L5 pedicle    IMPRESSION:  Significantly limited by lack of IV contrast.  No specific acute inflammatoryor obstructive pathology.  Extensive hepatic metastases grossly similar to prior.  Normal volume abdominal pelvic ascites slightly changed. Cannot exclude malignant ascites.  Borderline to minimally enlarged retroperitoneal lymph nodes similar to prior possibly metastatic.  Additional findings as discussed    SOBIA GOEL MD; Attending Radiologist  This document has been electronically signed. Aug  9 2021  2:51PM      Vital Signs Last 24 Hrs  T(C): 36.4 (10 Aug 2021 05:25), Max: 36.7 (09 Aug 2021 16:27)  T(F): 97.5 (10 Aug 2021 05:25), Max: 98 (09 Aug 2021 16:27)  HR: 104 (10 Aug 2021 05:25) (79 - 104)  BP: 118/50 (10 Aug 2021 05:25) (111/71 - 146/71)  BP(mean): 77 (09 Aug 2021 16:27) (77 - 77)  RR: 18 (10 Aug 2021 05:25) (14 - 18)  SpO2: 97% RA (10 Aug 2021 05:25) (96% - 98%)    Physical Exam  Gen:  WN/WD Male resting in bed in mild respiratory distress  ENT:  NC/AT,  Sclera icteric,  no JVD noted  Thorax:  Symmetric, no retractions  Lung:  CTA b/l  CV:  S1, S2. RRR  Abd:  Softly distended, NT/ND.  BS normoactive, no masses to palp  Extrem:  +1 edema in LE's bilaterally DP/radial pulses +2  Neuro:  A&O x 3, no gross motor/sensory deficits

## 2021-08-10 NOTE — SWALLOW BEDSIDE ASSESSMENT ADULT - SWALLOW EVAL: RECOMMENDED FEEDING/EATING TECHNIQUES
alternate food with liquid/check mouth frequently for oral residue/pocketing/crush medication (when feasible)/oral hygiene/position upright (90 degrees)/small sips/bites

## 2021-08-11 ENCOUNTER — APPOINTMENT (OUTPATIENT)
Age: 71
End: 2021-08-11

## 2021-08-11 LAB
-  AMPICILLIN: SIGNIFICANT CHANGE UP
-  CIPROFLOXACIN: SIGNIFICANT CHANGE UP
-  LEVOFLOXACIN: SIGNIFICANT CHANGE UP
-  NITROFURANTOIN: SIGNIFICANT CHANGE UP
-  TETRACYCLINE: SIGNIFICANT CHANGE UP
-  VANCOMYCIN: SIGNIFICANT CHANGE UP
ALBUMIN SERPL ELPH-MCNC: 2.3 G/DL — LOW (ref 3.3–5)
ALP SERPL-CCNC: 817 U/L — HIGH (ref 40–120)
ALT FLD-CCNC: 3479 U/L — HIGH (ref 10–45)
AMMONIA BLD-MCNC: 17 UMOL/L — SIGNIFICANT CHANGE UP (ref 11–55)
ANION GAP SERPL CALC-SCNC: 12 MMOL/L — SIGNIFICANT CHANGE UP (ref 5–17)
APTT BLD: 31.1 SEC — SIGNIFICANT CHANGE UP (ref 27.5–35.5)
AST SERPL-CCNC: 6400 U/L — HIGH (ref 10–40)
BILIRUB SERPL-MCNC: 12.9 MG/DL — HIGH (ref 0.2–1.2)
BUN SERPL-MCNC: 43 MG/DL — HIGH (ref 7–23)
CALCIUM SERPL-MCNC: 7.6 MG/DL — LOW (ref 8.4–10.5)
CHLORIDE SERPL-SCNC: 97 MMOL/L — SIGNIFICANT CHANGE UP (ref 96–108)
CO2 SERPL-SCNC: 29 MMOL/L — SIGNIFICANT CHANGE UP (ref 22–31)
CREAT SERPL-MCNC: 2.14 MG/DL — HIGH (ref 0.5–1.3)
CULTURE RESULTS: SIGNIFICANT CHANGE UP
GLUCOSE SERPL-MCNC: 86 MG/DL — SIGNIFICANT CHANGE UP (ref 70–99)
HBV CORE AB SER-ACNC: REACTIVE
HBV SURFACE AB SER-ACNC: <3 MIU/ML — LOW
HBV SURFACE AG SER-ACNC: SIGNIFICANT CHANGE UP
HCT VFR BLD CALC: 33.2 % — LOW (ref 39–50)
HCV AB S/CO SERPL IA: 0.15 S/CO — SIGNIFICANT CHANGE UP (ref 0–0.99)
HCV AB SERPL-IMP: SIGNIFICANT CHANGE UP
HGB BLD-MCNC: 10.8 G/DL — LOW (ref 13–17)
INR BLD: 2.87 RATIO — HIGH (ref 0.88–1.16)
LACTATE SERPL-SCNC: 9.6 MMOL/L — CRITICAL HIGH (ref 0.7–2)
MAGNESIUM SERPL-MCNC: 1.9 MG/DL — SIGNIFICANT CHANGE UP (ref 1.6–2.6)
MCHC RBC-ENTMCNC: 28.6 PG — SIGNIFICANT CHANGE UP (ref 27–34)
MCHC RBC-ENTMCNC: 32.5 GM/DL — SIGNIFICANT CHANGE UP (ref 32–36)
MCV RBC AUTO: 88.1 FL — SIGNIFICANT CHANGE UP (ref 80–100)
METHOD TYPE: SIGNIFICANT CHANGE UP
NRBC # BLD: 2 /100 WBCS — HIGH (ref 0–0)
ORGANISM # SPEC MICROSCOPIC CNT: SIGNIFICANT CHANGE UP
ORGANISM # SPEC MICROSCOPIC CNT: SIGNIFICANT CHANGE UP
PHOSPHATE SERPL-MCNC: 4 MG/DL — SIGNIFICANT CHANGE UP (ref 2.5–4.5)
PLATELET # BLD AUTO: 147 K/UL — LOW (ref 150–400)
POTASSIUM SERPL-MCNC: 4 MMOL/L — SIGNIFICANT CHANGE UP (ref 3.5–5.3)
POTASSIUM SERPL-SCNC: 4 MMOL/L — SIGNIFICANT CHANGE UP (ref 3.5–5.3)
PROT SERPL-MCNC: 5.6 G/DL — LOW (ref 6–8.3)
PROTHROM AB SERPL-ACNC: 33 SEC — HIGH (ref 10.6–13.6)
RBC # BLD: 3.77 M/UL — LOW (ref 4.2–5.8)
RBC # FLD: 21.8 % — HIGH (ref 10.3–14.5)
SODIUM SERPL-SCNC: 138 MMOL/L — SIGNIFICANT CHANGE UP (ref 135–145)
SPECIMEN SOURCE: SIGNIFICANT CHANGE UP
TROPONIN I SERPL-MCNC: 0.17 NG/ML — HIGH (ref 0.02–0.06)
WBC # BLD: 15.07 K/UL — HIGH (ref 3.8–10.5)
WBC # FLD AUTO: 15.07 K/UL — HIGH (ref 3.8–10.5)

## 2021-08-11 PROCEDURE — 99232 SBSQ HOSP IP/OBS MODERATE 35: CPT

## 2021-08-11 PROCEDURE — 99233 SBSQ HOSP IP/OBS HIGH 50: CPT

## 2021-08-11 RX ORDER — ACETYLCYSTEINE 200 MG/ML
6 VIAL (ML) MISCELLANEOUS ONCE
Refills: 0 | Status: COMPLETED | OUTPATIENT
Start: 2021-08-11 | End: 2021-08-11

## 2021-08-11 RX ORDER — ACETYLCYSTEINE 200 MG/ML
3.2 VIAL (ML) MISCELLANEOUS ONCE
Refills: 0 | Status: COMPLETED | OUTPATIENT
Start: 2021-08-11 | End: 2021-08-11

## 2021-08-11 RX ORDER — ALBUMIN HUMAN 25 %
50 VIAL (ML) INTRAVENOUS EVERY 6 HOURS
Refills: 0 | Status: DISCONTINUED | OUTPATIENT
Start: 2021-08-11 | End: 2021-08-12

## 2021-08-11 RX ORDER — ACETYLCYSTEINE 200 MG/ML
10 VIAL (ML) MISCELLANEOUS ONCE
Refills: 0 | Status: COMPLETED | OUTPATIENT
Start: 2021-08-11 | End: 2021-08-11

## 2021-08-11 RX ORDER — OCTREOTIDE ACETATE 200 UG/ML
100 INJECTION, SOLUTION INTRAVENOUS; SUBCUTANEOUS EVERY 8 HOURS
Refills: 0 | Status: DISCONTINUED | OUTPATIENT
Start: 2021-08-11 | End: 2021-08-12

## 2021-08-11 RX ADMIN — Medication 150 MEQ/KG/HR: at 03:29

## 2021-08-11 RX ADMIN — Medication 64.38 GRAM(S): at 19:43

## 2021-08-11 RX ADMIN — MIDODRINE HYDROCHLORIDE 5 MILLIGRAM(S): 2.5 TABLET ORAL at 05:14

## 2021-08-11 RX ADMIN — OCTREOTIDE ACETATE 100 MICROGRAM(S): 200 INJECTION, SOLUTION INTRAVENOUS; SUBCUTANEOUS at 21:07

## 2021-08-11 RX ADMIN — Medication 129 GRAM(S): at 15:17

## 2021-08-11 RX ADMIN — MIDODRINE HYDROCHLORIDE 5 MILLIGRAM(S): 2.5 TABLET ORAL at 11:12

## 2021-08-11 RX ADMIN — Medication 50 MILLILITER(S): at 23:20

## 2021-08-11 RX ADMIN — Medication 300 GRAM(S): at 13:08

## 2021-08-11 RX ADMIN — Medication 50 MILLILITER(S): at 17:55

## 2021-08-11 RX ADMIN — CHLORHEXIDINE GLUCONATE 1 APPLICATION(S): 213 SOLUTION TOPICAL at 06:15

## 2021-08-11 RX ADMIN — MIDODRINE HYDROCHLORIDE 5 MILLIGRAM(S): 2.5 TABLET ORAL at 17:55

## 2021-08-11 RX ADMIN — OCTREOTIDE ACETATE 100 MICROGRAM(S): 200 INJECTION, SOLUTION INTRAVENOUS; SUBCUTANEOUS at 13:41

## 2021-08-11 NOTE — DIETITIAN INITIAL EVALUATION ADULT. - PERTINENT MEDS FT
MEDICATIONS  (STANDING):  chlorhexidine 4% Liquid 1 Application(s) Topical <User Schedule>  midodrine. 5 milliGRAM(s) Oral three times a day    MEDICATIONS  (PRN):  ondansetron Injectable 4 milliGRAM(s) IV Push every 8 hours PRN Nausea and/or Vomiting  sodium chloride 0.9% lock flush 10 milliLiter(s) IV Push every 1 hour PRN Pre/post blood products, medications, blood draw, and to maintain line patency

## 2021-08-11 NOTE — DIETITIAN INITIAL EVALUATION ADULT. - OTHER INFO
72yo male with hx of Metastatic Colon Adenocarcinoma diagnosed 10years ago was in remission, HTN, symptomatic COVID-19 infection in early 2021, presented to the ED with complaints of jaundice associated with b/l LE Swelling. Patient with (+) jaundice, diet. SLP eval noted suggesting pureed with thin liquids , diet advanced from NPO to pureed this morning ? po intake at present , Patient s/p 2 units PRBC's , & emergent HD .(+2) LE edema, (+) BM (8/10) Skin is intact , Oncology is noted to be initiating palliative chemo soon as per MD . As per EMR , previous hospitalization patient was noted consuming % of meals , and weight was spjuv020.9/60.9kg , weight appears unchanged however patient is noted with (+2) LE edema . Patient to receive additional HD session today as per MD.

## 2021-08-11 NOTE — DIETITIAN INITIAL EVALUATION ADULT. - PERTINENT LABORATORY DATA
Date: 11 Aug 2021 05:30  Hemoglobin 10.8   Hematocrit 33.2     Date: 08-11  Sodium 138  Potassium 4.0  Glucose Serum 86  BUN 43<H>  Creatinine 2.14<H>   Elevated LFT's     ACCUCHECK  POCT Blood Glucose.: 175 mg/dL (10 Aug 2021 17:29)  POCT Blood Glucose.: 146 mg/dL (10 Aug 2021 12:56)  POCT Blood Glucose.: 162 mg/dL (10 Aug 2021 09:02)  POCT Blood Glucose.: 50 mg/dL (10 Aug 2021 08:28)

## 2021-08-11 NOTE — PROGRESS NOTE ADULT - CONVERSATION DETAILS
Case reviewed with family and Kemi Fitzgerald and Alexandra.  All are aware that patient has extremly poor prognosis and is going to pass away soon.  He does not understand what is going on currently.  His family members know that he would want to be home if possible and are asking for home hospice care.  They understand that hospice care RN will be calling them soon to make arrangements for home hospice care.
I updated patient and sons Wilberto and Александр (via conference call) at bedside. Patient to be transferred to ICU for management of hyperkalemia.   Patient and sons want to remain Full Code at this time  Discussed patient's overall clinical progress since admission, reviewed most recent labs and medications with patient and family. The opportunity for questions was provided and all questions asked were answered.

## 2021-08-11 NOTE — PROGRESS NOTE ADULT - PROBLEM SELECTOR PLAN 2
D/W Dr. Motley 8/10/21 and updated her regarding patient status (she also s/w family).  Favor best supportive care at this time-multi-organ dysfunction. Palliative care f/u. Patient appears comfortable currently.

## 2021-08-12 ENCOUNTER — TRANSCRIPTION ENCOUNTER (OUTPATIENT)
Age: 71
End: 2021-08-12

## 2021-08-12 VITALS
RESPIRATION RATE: 36 BRPM | SYSTOLIC BLOOD PRESSURE: 145 MMHG | TEMPERATURE: 98 F | HEART RATE: 95 BPM | DIASTOLIC BLOOD PRESSURE: 51 MMHG | OXYGEN SATURATION: 96 %

## 2021-08-12 LAB
ALBUMIN SERPL ELPH-MCNC: 2.2 G/DL — LOW (ref 3.3–5)
ALP SERPL-CCNC: 484 U/L — HIGH (ref 40–120)
ALT FLD-CCNC: 2546 U/L — HIGH (ref 10–45)
ANION GAP SERPL CALC-SCNC: 22 MMOL/L — HIGH (ref 5–17)
AST SERPL-CCNC: 7868 U/L — HIGH (ref 10–40)
BASOPHILS # BLD AUTO: 0 K/UL — SIGNIFICANT CHANGE UP (ref 0–0.2)
BASOPHILS NFR BLD AUTO: 0 % — SIGNIFICANT CHANGE UP (ref 0–2)
BILIRUB SERPL-MCNC: 13.5 MG/DL — HIGH (ref 0.2–1.2)
BUN SERPL-MCNC: 36 MG/DL — HIGH (ref 7–23)
CALCIUM SERPL-MCNC: 7.2 MG/DL — LOW (ref 8.4–10.5)
CHLORIDE SERPL-SCNC: 93 MMOL/L — LOW (ref 96–108)
CO2 SERPL-SCNC: 22 MMOL/L — SIGNIFICANT CHANGE UP (ref 22–31)
CREAT SERPL-MCNC: 2.81 MG/DL — HIGH (ref 0.5–1.3)
EOSINOPHIL # BLD AUTO: 0 K/UL — SIGNIFICANT CHANGE UP (ref 0–0.5)
EOSINOPHIL NFR BLD AUTO: 0 % — SIGNIFICANT CHANGE UP (ref 0–6)
GLUCOSE BLDC GLUCOMTR-MCNC: 110 MG/DL — HIGH (ref 70–99)
GLUCOSE BLDC GLUCOMTR-MCNC: 175 MG/DL — HIGH (ref 70–99)
GLUCOSE BLDC GLUCOMTR-MCNC: 62 MG/DL — LOW (ref 70–99)
GLUCOSE BLDC GLUCOMTR-MCNC: 66 MG/DL — LOW (ref 70–99)
GLUCOSE BLDC GLUCOMTR-MCNC: 70 MG/DL — SIGNIFICANT CHANGE UP (ref 70–99)
GLUCOSE SERPL-MCNC: 42 MG/DL — CRITICAL LOW (ref 70–99)
HCT VFR BLD CALC: 25 % — LOW (ref 39–50)
HGB BLD-MCNC: 8.2 G/DL — LOW (ref 13–17)
HYPOCHROMIA BLD QL: SIGNIFICANT CHANGE UP
LYMPHOCYTES # BLD AUTO: 1.36 K/UL — SIGNIFICANT CHANGE UP (ref 1–3.3)
LYMPHOCYTES # BLD AUTO: 6 % — LOW (ref 13–44)
MACROCYTES BLD QL: SLIGHT — SIGNIFICANT CHANGE UP
MAGNESIUM SERPL-MCNC: 2 MG/DL — SIGNIFICANT CHANGE UP (ref 1.6–2.6)
MANUAL SMEAR VERIFICATION: SIGNIFICANT CHANGE UP
MCHC RBC-ENTMCNC: 29.6 PG — SIGNIFICANT CHANGE UP (ref 27–34)
MCHC RBC-ENTMCNC: 32.8 GM/DL — SIGNIFICANT CHANGE UP (ref 32–36)
MCV RBC AUTO: 90.3 FL — SIGNIFICANT CHANGE UP (ref 80–100)
METAMYELOCYTES # FLD: 1 % — HIGH (ref 0–0)
MONOCYTES # BLD AUTO: 2.03 K/UL — HIGH (ref 0–0.9)
MONOCYTES NFR BLD AUTO: 9 % — SIGNIFICANT CHANGE UP (ref 2–14)
NEUTROPHILS # BLD AUTO: 18.98 K/UL — HIGH (ref 1.8–7.4)
NEUTROPHILS NFR BLD AUTO: 79 % — HIGH (ref 43–77)
NEUTS BAND # BLD: 5 % — SIGNIFICANT CHANGE UP (ref 0–8)
NRBC # BLD: 3 /100 — HIGH (ref 0–0)
PHOSPHATE SERPL-MCNC: 5.6 MG/DL — HIGH (ref 2.5–4.5)
PLAT MORPH BLD: NORMAL — SIGNIFICANT CHANGE UP
PLATELET # BLD AUTO: 93 K/UL — LOW (ref 150–400)
POLYCHROMASIA BLD QL SMEAR: SIGNIFICANT CHANGE UP
POTASSIUM SERPL-MCNC: 3.9 MMOL/L — SIGNIFICANT CHANGE UP (ref 3.5–5.3)
POTASSIUM SERPL-SCNC: 3.9 MMOL/L — SIGNIFICANT CHANGE UP (ref 3.5–5.3)
PROT SERPL-MCNC: 4.5 G/DL — LOW (ref 6–8.3)
RBC # BLD: 2.77 M/UL — LOW (ref 4.2–5.8)
RBC # FLD: 23.2 % — HIGH (ref 10.3–14.5)
RBC BLD AUTO: ABNORMAL
SODIUM SERPL-SCNC: 137 MMOL/L — SIGNIFICANT CHANGE UP (ref 135–145)
TROPONIN I SERPL-MCNC: 0.24 NG/ML — HIGH (ref 0.02–0.06)
WBC # BLD: 22.59 K/UL — HIGH (ref 3.8–10.5)
WBC # FLD AUTO: 22.59 K/UL — HIGH (ref 3.8–10.5)

## 2021-08-12 PROCEDURE — 83735 ASSAY OF MAGNESIUM: CPT

## 2021-08-12 PROCEDURE — 81001 URINALYSIS AUTO W/SCOPE: CPT

## 2021-08-12 PROCEDURE — 0225U NFCT DS DNA&RNA 21 SARSCOV2: CPT

## 2021-08-12 PROCEDURE — 93010 ELECTROCARDIOGRAM REPORT: CPT

## 2021-08-12 PROCEDURE — 99285 EMERGENCY DEPT VISIT HI MDM: CPT

## 2021-08-12 PROCEDURE — 83880 ASSAY OF NATRIURETIC PEPTIDE: CPT

## 2021-08-12 PROCEDURE — 80076 HEPATIC FUNCTION PANEL: CPT

## 2021-08-12 PROCEDURE — 93306 TTE W/DOPPLER COMPLETE: CPT

## 2021-08-12 PROCEDURE — 86901 BLOOD TYPING SEROLOGIC RH(D): CPT

## 2021-08-12 PROCEDURE — 82962 GLUCOSE BLOOD TEST: CPT

## 2021-08-12 PROCEDURE — 85730 THROMBOPLASTIN TIME PARTIAL: CPT

## 2021-08-12 PROCEDURE — 83690 ASSAY OF LIPASE: CPT

## 2021-08-12 PROCEDURE — 86704 HEP B CORE ANTIBODY TOTAL: CPT

## 2021-08-12 PROCEDURE — 92610 EVALUATE SWALLOWING FUNCTION: CPT

## 2021-08-12 PROCEDURE — 99232 SBSQ HOSP IP/OBS MODERATE 35: CPT

## 2021-08-12 PROCEDURE — 99261: CPT

## 2021-08-12 PROCEDURE — 84484 ASSAY OF TROPONIN QUANT: CPT

## 2021-08-12 PROCEDURE — 87186 SC STD MICRODIL/AGAR DIL: CPT

## 2021-08-12 PROCEDURE — 82803 BLOOD GASES ANY COMBINATION: CPT

## 2021-08-12 PROCEDURE — 86706 HEP B SURFACE ANTIBODY: CPT

## 2021-08-12 PROCEDURE — 86769 SARS-COV-2 COVID-19 ANTIBODY: CPT

## 2021-08-12 PROCEDURE — 36000 PLACE NEEDLE IN VEIN: CPT

## 2021-08-12 PROCEDURE — 85027 COMPLETE CBC AUTOMATED: CPT

## 2021-08-12 PROCEDURE — 87340 HEPATITIS B SURFACE AG IA: CPT

## 2021-08-12 PROCEDURE — 82140 ASSAY OF AMMONIA: CPT

## 2021-08-12 PROCEDURE — 84100 ASSAY OF PHOSPHORUS: CPT

## 2021-08-12 PROCEDURE — 93005 ELECTROCARDIOGRAM TRACING: CPT

## 2021-08-12 PROCEDURE — 80048 BASIC METABOLIC PNL TOTAL CA: CPT

## 2021-08-12 PROCEDURE — 92526 ORAL FUNCTION THERAPY: CPT

## 2021-08-12 PROCEDURE — 86850 RBC ANTIBODY SCREEN: CPT

## 2021-08-12 PROCEDURE — 86900 BLOOD TYPING SEROLOGIC ABO: CPT

## 2021-08-12 PROCEDURE — 87086 URINE CULTURE/COLONY COUNT: CPT

## 2021-08-12 PROCEDURE — 86803 HEPATITIS C AB TEST: CPT

## 2021-08-12 PROCEDURE — 71045 X-RAY EXAM CHEST 1 VIEW: CPT

## 2021-08-12 PROCEDURE — P9047: CPT

## 2021-08-12 PROCEDURE — 36415 COLL VENOUS BLD VENIPUNCTURE: CPT

## 2021-08-12 PROCEDURE — 83605 ASSAY OF LACTIC ACID: CPT

## 2021-08-12 PROCEDURE — 74176 CT ABD & PELVIS W/O CONTRAST: CPT | Mod: MA

## 2021-08-12 PROCEDURE — 80053 COMPREHEN METABOLIC PANEL: CPT

## 2021-08-12 PROCEDURE — 85025 COMPLETE CBC W/AUTO DIFF WBC: CPT

## 2021-08-12 PROCEDURE — 85610 PROTHROMBIN TIME: CPT

## 2021-08-12 RX ORDER — DEXTROSE 50 % IN WATER 50 %
50 SYRINGE (ML) INTRAVENOUS ONCE
Refills: 0 | Status: COMPLETED | OUTPATIENT
Start: 2021-08-12 | End: 2021-08-12

## 2021-08-12 RX ORDER — MORPHINE SULFATE 50 MG/1
1 CAPSULE, EXTENDED RELEASE ORAL ONCE
Refills: 0 | Status: DISCONTINUED | OUTPATIENT
Start: 2021-08-12 | End: 2021-08-12

## 2021-08-12 RX ORDER — ASPIRIN/CALCIUM CARB/MAGNESIUM 324 MG
81 TABLET ORAL DAILY
Refills: 0 | Status: DISCONTINUED | OUTPATIENT
Start: 2021-08-12 | End: 2021-08-12

## 2021-08-12 RX ORDER — METOPROLOL TARTRATE 50 MG
12.5 TABLET ORAL
Qty: 60 | Refills: 0
Start: 2021-08-12

## 2021-08-12 RX ORDER — PREGABALIN 225 MG/1
0 CAPSULE ORAL
Qty: 0 | Refills: 0 | DISCHARGE

## 2021-08-12 RX ORDER — CHOLECALCIFEROL (VITAMIN D3) 125 MCG
1 CAPSULE ORAL
Qty: 0 | Refills: 0 | DISCHARGE

## 2021-08-12 RX ORDER — METOPROLOL TARTRATE 50 MG
12.5 TABLET ORAL EVERY 12 HOURS
Refills: 0 | Status: DISCONTINUED | OUTPATIENT
Start: 2021-08-12 | End: 2021-08-12

## 2021-08-12 RX ORDER — METOPROLOL TARTRATE 50 MG
25 TABLET ORAL DAILY
Refills: 0 | Status: DISCONTINUED | OUTPATIENT
Start: 2021-08-12 | End: 2021-08-12

## 2021-08-12 RX ORDER — METOPROLOL TARTRATE 50 MG
1 TABLET ORAL
Qty: 0 | Refills: 0 | DISCHARGE

## 2021-08-12 RX ORDER — ASCORBIC ACID 60 MG
1 TABLET,CHEWABLE ORAL
Qty: 0 | Refills: 0 | DISCHARGE

## 2021-08-12 RX ORDER — METOPROLOL TARTRATE 50 MG
0.5 TABLET ORAL
Qty: 30 | Refills: 0
Start: 2021-08-12 | End: 2021-09-10

## 2021-08-12 RX ADMIN — OCTREOTIDE ACETATE 100 MICROGRAM(S): 200 INJECTION, SOLUTION INTRAVENOUS; SUBCUTANEOUS at 05:11

## 2021-08-12 RX ADMIN — Medication 12.5 MILLIGRAM(S): at 11:42

## 2021-08-12 RX ADMIN — OCTREOTIDE ACETATE 100 MICROGRAM(S): 200 INJECTION, SOLUTION INTRAVENOUS; SUBCUTANEOUS at 15:54

## 2021-08-12 RX ADMIN — Medication 81 MILLIGRAM(S): at 11:42

## 2021-08-12 RX ADMIN — MIDODRINE HYDROCHLORIDE 5 MILLIGRAM(S): 2.5 TABLET ORAL at 05:11

## 2021-08-12 RX ADMIN — Medication 50 MILLILITER(S): at 05:12

## 2021-08-12 RX ADMIN — Medication 50 MILLILITER(S): at 11:42

## 2021-08-12 RX ADMIN — MORPHINE SULFATE 1 MILLIGRAM(S): 50 CAPSULE, EXTENDED RELEASE ORAL at 15:30

## 2021-08-12 RX ADMIN — MORPHINE SULFATE 1 MILLIGRAM(S): 50 CAPSULE, EXTENDED RELEASE ORAL at 15:45

## 2021-08-12 RX ADMIN — CHLORHEXIDINE GLUCONATE 1 APPLICATION(S): 213 SOLUTION TOPICAL at 05:10

## 2021-08-12 RX ADMIN — Medication 50 MILLILITER(S): at 09:03

## 2021-08-12 NOTE — DISCHARGE NOTE PROVIDER - HOSPITAL COURSE
History of Present Illness:   72yo male with hx of Metastatic Colon Adenocarcinoma diagnosed 10years ago was in remission, HTN, symptomatic COVID-19 infection in early 2021, presented to the ED with complaints of jaundice associated with b/l LE Swelling. Pt accompanied by his wife who is also providing hx. Per both of them, pt was noted on routine follow up to be significant anemic a few weeks ago. He was scheduled for 2unit PRBC transfusion. Since the transfusion, pt states he has noted b/l le swelling as well as his belly. He denies any pain or discomfort. Pt or family cannot recall how long he has been noted to be yellow. He does endorse decrease in appetite. Denies cp, palpitations, sob, abd pain, N/V, fever, chills.   Of note, pt is scheduled to see his oncologist, Dr. Banks, in 2 days to follow up on his CT scan he recently had done to assess for recurrence of his cancer. History of Present Illness:   71 year old male with history or Metastatic Colon Adenocarcinoma diagnosed 10years ago was in remission but now with mets to Liver, HTN, symptomatic COVID-19 infection in early 2021, presented to the ED with complaints of jaundice associated with b/l LE Swelling. Initial workup found to have acute renal failure and hosptial course he developed worsening liver failure. Patient was transferred to ICU for emergent HD as his potassium was critical. While in ICU patient liver function worsened with worsening lactic acidosis. Patient also noted to have dysphagia and was given a puree diet  extensive  discussion had with family about Liver function and renal function, understand patient poor prognosis and limited medical therapy, they wanted to take patient home on home hospice.    History of Present Illness:   71 year old male with history or Metastatic Colon Adenocarcinoma diagnosed 10years ago was in remission but now with mets to Liver, HTN, symptomatic COVID-19 infection in early 2021, presented to the ED with complaints of jaundice associated with b/l LE Swelling. Initial workup found to have acute renal failure and hosptial course he developed worsening liver failure. Patient was transferred to ICU for emergent HD as his potassium was critical. While in ICU patient liver function worsened with worsening lactic acidosis. Patient also noted to have dysphagia and was given a puree diet  extensive  discussion had with family about Liver function and renal function, understand patient poor prognosis and limited medical therapy, they wanted to take patient home on home hospice.     For full account of hospital course please refer to actual medical records. As this is a brief summery.

## 2021-08-12 NOTE — PROGRESS NOTE ADULT - ASSESSMENT
72yo male with hx of Metastatic Colon Adenocarcinoma diagnosed 10years ago subsequently in remission, HTN, symptomatic COVID-19 infection in early 2021, who presented to the ED with complaints of jaundice associated with b/l LE swelling. Pt was noted on routine follow up to be significantly anemic a few weeks PTA, s/p PRBC transfusion. Since the transfusion, pt noted LE swelling and increased abdominal girth.  He denied any pain or discomfort. Denied cp, palpitations, sob, abd pain, N/V, fever, chills.   Of note, pt is scheduled to see his oncologist, Dr. Motley, this week to follow up on his CT scan he recently had done.
Palliative:  [facility]
Physical Examination:  GENERAL:               Alert, Oriented, No acute distress.    HEENT:                    No JVD, Moist MM  PULM:                     Bilateral air entry, Clear to auscultation bilaterally, no significant sputum production, No Rales, No Rhonchi, No Wheezing  CVS:                         S1, S2,  +Murmur  ABD:                        Soft, hepatomegally nondistended, nontender, normoactive bowel sounds,   EXT:                         ++ edema, nontender, No Cyanosis or Clubbing   Vascular:                Warm Extremities, Normal Capillary refill,    NEURO:                  Alert, oriented, interactive, nonfocal, follows commands  PSYC:                      Calm, + Insight.        Assessment  Acute liver failure and Acute liver failure likely due to metastatic colon cancer to liver  Lactic acidosis due to above  doubt acute infection/Sepsis    underlying HTN, DM2, Chronic Diastolic CHF         Plan  Family transitioned care to Comfort care yesterday and want to continue inpatient treatment till go home on home hospice.  f/u am labs  Plan for HD as per Renal , possible today  Octrotide/midodrine/albumin for hepato renal syndrome    Appreciate heme/onc input  Appreciate GI input  Both have no intervention  s/p Nac, f/u labs    Palliative care f/u  continue polanco    OOB to chair    PMD:	Dr Banks			                   Notified(Date): 8/11/21  Family Updated:  Family called msg left on [phone] and [phone]		                                 Date:  8/11/21  will discuss with family once i discuss with CM    Sedation & Analgesia:	as above  Diet/Nutrition:		as tolerated  GI PPx:			PPI    DVT Ppx:		venodynes      Activity:		               advance as tolerated  Head of Bed:               35-45  Glycemic Control:        n/a    Lines:  CENTRAL LINE: 	[x ] YES [ ] NO	                    LOCATION:   	                       DATE INSERTED:   	                    REMOVE:  [ ] YES [x ] NO    //remove when no longer having hd  A-LINE:  	                [ ] YES [ ] NO                      LOCATION:   	                       DATE INSERTED: 		            REMOVE:  [ ] YES [ ] NO    POLANCO: 		        [x ] YES [ ] NO  		                                       DATE INSERTED:		            REMOVE:  [ ] YES [x NO  //comfort    Restraints were deemed necessary to prevent removal of life-sustaining devices [  ] YES   [ x  ]  NO    Disposition: ICU monitoring    Goals of Care: DNR/I   Prognosis poor
72yo male with hx of Metastatic Colon Adenocarcinoma diagnosed 10years ago subsequently in remission, HTN, symptomatic COVID-19 infection in early 2021, who presented to the ED with complaints of jaundice associated with b/l LE swelling. Pt was noted on routine follow up to be significantly anemic a few weeks PTA, s/p PRBC transfusion. Since the transfusion, pt noted LE swelling and increased abdominal girth.  He denied any pain or discomfort. Denied cp, palpitations, sob, abd pain, N/V, fever, chills.   Of note, pt is scheduled to see his oncologist, Dr. Motley, this week to follow up on his CT scan he recently had done.
Palliative:  Pt is clinically stable for transfer home for home hospice.  Family aware.
PLAN:    -now DNR/DNI, goal to maximize medical treatment and for patient to return home  -will continue with HD as per renal recs  -no longer on bicarb drip  -was started on octreotide  -albumin 25% for   -maitnain midodrine and titrate dose to maintain MAP 65-70  -AM labs 
PLAN:  -patient S/p HD, with marked improvement in labs  -will f/u VBG now  -daily discusisons with renal team regarding furether HD  -will maitanin bicarb drip overnight, likely stop tomorrow AM  -on midodrine, maintain and titrate doses every 8 hours for goal MAP 65-70  -follow/trend UOP goal >0.5 cc/kg/hr  -lactate still 10, howewver with underlying liver mets/dz unlikely patient will be able to clear Lactate. WIll most likely need further HD sessions   -INR 2.72, currently no need for DVt prophylaxis given elevated INR, if INR drops to less than 2 will start heparin.   -AM labs 
Physical Examination:  GENERAL:               Alert, Oriented, No acute distress.    HEENT:                    No JVD, Moist MM  PULM:                     Bilateral air entry, Clear to auscultation bilaterally, no significant sputum production, No Rales, No Rhonchi, No Wheezing  CVS:                         S1, S2,  +Murmur  ABD:                        Soft, hepatomegally nondistended, nontender, normoactive bowel sounds,   EXT:                         + edema, nontender, No Cyanosis or Clubbing   Vascular:                Warm Extremities, Normal Capillary refill,    NEURO:                  Alert, oriented, interactive, nonfocal, follows commands  PSYC:                      Calm, + Insight.        Assessment  Acute liver failure and Acute liver failure likely due to metastatic colon cancer to liver  Lactic acidosis due to above  doubt acute infection/Sepsis    underlying HTN, DM2, Chronic Diastolic CHF         Plan  Plan for HD as per Renal  Hold IVF  Midodrine for BP support  Appreciate heme/onc input  Appreciate GI input  Both have no intervention  as Liver function worsening will give NAC  Palliative care f/u  continue polanco      PMD:	Dr Banks			                   Notified(Date): 8/11/21  Family Updated:  Family called msg left on [phone] and [phone]		                                 Date:  8/11/21      Sedation & Analgesia:	as above  Diet/Nutrition:		as tolerated  GI PPx:			PPI    DVT Ppx:		venodynes      Activity:		               advance as tolerated  Head of Bed:               35-45  Glycemic Control:        n/a    Lines:  CENTRAL LINE: 	[x ] YES [ ] NO	                    LOCATION:   	                       DATE INSERTED:   	                    REMOVE:  [ ] YES [x ] NO    A-LINE:  	                [ ] YES [ ] NO                      LOCATION:   	                       DATE INSERTED: 		            REMOVE:  [ ] YES [ ] NO    POLANCO: 		        [x ] YES [ ] NO  		                                       DATE INSERTED:		            REMOVE:  [ ] YES [x NO      Restraints were deemed necessary to prevent removal of life-sustaining devices [  ] YES   [ x  ]  NO    Disposition: ICU monitoring    Goals of Care: Full code   Prognosis poor
72yo male with hx of Metastatic Colon Adenocarcinoma diagnosed 10years ago was in remission, HTN, symptomatic COVID-19 infection in early 2021, presented to the ED with complaints of jaundice associated with b/l LE Swelling, noted to have nonobstructive hyperbilirubinemia/Jaundice w/ mild ascites.     # Hyperkalemia   # acute kidney injury on Chronic Kidney Disease 3  # AG metabolic acidosis from renal failure and type 2 lactic acidosis   - repeat BMP showing worsening of K   - Cardiac monitoring. Calcium infusion. Insulin dextrose per protocol  - Renal consult  - ICU consulted, pt to be transferred to ICU    #Jaundice  #Hyperbilirubinemia - worsening   #Transaminitis  #Metastatic Colon adenocarcinoma  -CT abd/pelvis with normal biliary duct caliber. Extensive metastatic disease identified   -Prior hx of metastatic disease to liver and lungs s/p lung mets resection in 2017  -Discussed case with Oncology, Dr. Yates; updated Dr. Motley     #B/L LE swelling  -Likely secondary to hypoalbuminemia leading to mild ascites and LE edema  -Leg elevation and compression stocks    #HTN  -Home med Metoprolol XL 25mg daily  -Hold as normotensive  -Monitor    #DVT ppx  HSQ    Updated family and Oncologist. D/w Dr. Fitzgerald in ICU who will accept pt for higher level of care

## 2021-08-12 NOTE — PROGRESS NOTE ADULT - REASON FOR ADMISSION
B/L LE swelling

## 2021-08-12 NOTE — PROGRESS NOTE ADULT - PROBLEM SELECTOR PROBLEM 2
Hyperbilirubinemia
Adenocarcinoma of colon metastatic to liver
Adenocarcinoma of colon metastatic to liver

## 2021-08-12 NOTE — HOSPICE CARE NOTE - CONVESATION DETAILS
DME delivered, consents completed. Pt scheduled for d/c today. This writer reviewed chart. Pt with elevated RR/HR. Pt to receive doses of Roxanol prior to leaving the hospital. Comfort pack medications sent to [facility] STAT (within 4 hours).   Ambulance scheduled for 8:30pm to give enough time for the comfort pack to arrive home. Family aware and in agreement.  Please contact [facility] (Red team) if pt is unstable for transfer home. 24/hr # [phone].

## 2021-08-12 NOTE — PROVIDER CONTACT NOTE (EICU) - SITUATION
eLerted by bedside team.  Request for CBC with diff for AM labs.  Order placed.  To be followed up by bedside team.

## 2021-08-12 NOTE — DISCHARGE NOTE PROVIDER - NSDCMRMEDTOKEN_GEN_ALL_CORE_FT
aspirin 81 mg oral tablet: 1 tab(s) orally once a day - last dose 12/29/2016  metoprolol succinate 25 mg oral tablet, extended release: 1 tab(s) orally once a day  Vitamin B12:   Vitamin C: 1 tab(s) orally once a day  Vitamin D3: 1 tab(s) orally once a day   aspirin 81 mg oral tablet: 1 tab(s) orally once a day - last dose 12/29/2016  metoprolol tartrate 25 mg oral tablet: 0.5 tab(s) orally 2 times a day

## 2021-08-12 NOTE — PROVIDER CONTACT NOTE (HYPOGLYCEMIA EVENT) - NS PROVIDER CONTACT NOTE-TREATMENT-HYPO
4 oz Fruit Juice (Specify quantity, date/time) 4 oz Fruit Juice (Specify quantity, date/time)/Dextrose 50% 25 Grams IV Push

## 2021-08-12 NOTE — PROVIDER CONTACT NOTE (HYPOGLYCEMIA EVENT) - NS PROVIDER CONTACT SITUATION-HYPO
Patient had critical serum glucose of 42  Fingerstick 66, then repeated to 62.  4 oz apple juice given Patient had critical serum glucose of 42  Fingerstick 66, then repeated to 62.  4 oz apple juice given  Repeat in 15 min 70, patient refusing to drink/eat, 50mg D50 given as ordered per MD Patient had critical serum glucose of 42  Fingerstick 66, then repeated to 62.  4 oz apple juice given  Repeat in 15 min 70, patient refusing to drink/eat, 50mg D50 given as ordered per MD.

## 2021-08-12 NOTE — PROGRESS NOTE ADULT - PROBLEM SELECTOR PLAN 2
Favor best supportive care at this time-multi-organ dysfunction. Palliative care f/u noted. Plans for Hospice care in progress. Patient appears comfortable currently.

## 2021-08-12 NOTE — PROVIDER CONTACT NOTE (HYPOGLYCEMIA EVENT) - NS PROVIDER CONTACT BACKGROUND-HYPO
Age: 71y    Gender: Male    POCT Blood Glucose:  62 mg/dL (08-12-21 @ 08:45)  66 mg/dL (08-12-21 @ 08:44)      eMAR:  octreotide  Injectable   100 MICROGram(s) SubCutaneous (08-12-21 @ 05:11)   100 MICROGram(s) SubCutaneous (08-11-21 @ 21:07)   100 MICROGram(s) SubCutaneous (08-11-21 @ 13:41)

## 2021-08-12 NOTE — DISCHARGE NOTE PROVIDER - NSDCCPCAREPLAN_GEN_ALL_CORE_FT
PRINCIPAL DISCHARGE DIAGNOSIS  Diagnosis: Liver failure  Assessment and Plan of Treatment:       SECONDARY DISCHARGE DIAGNOSES  Diagnosis: Anasarca  Assessment and Plan of Treatment:     Diagnosis: Hyperbilirubinemia  Assessment and Plan of Treatment:     Diagnosis: Hyperkalemia  Assessment and Plan of Treatment:     Diagnosis: Dysphagia  Assessment and Plan of Treatment:     Diagnosis: Elevated troponin  Assessment and Plan of Treatment:

## 2021-08-12 NOTE — DISCHARGE NOTE NURSING/CASE MANAGEMENT/SOCIAL WORK - PATIENT PORTAL LINK FT
You can access the FollowMyHealth Patient Portal offered by [facility] by registering at the following website: [email or web address]. By joining [facility]’s FollowMyHealth portal, you will also be able to view your health information using other applications (apps) compatible with our system.

## 2021-08-12 NOTE — PROVIDER CONTACT NOTE (CRITICAL VALUE NOTIFICATION) - TEST AND RESULT REPORTED:
K+6.6 Glucose is 40
serum glucose 42  troponin 0.241
troponin 0.106
Co2 < 5, k 7.3
Lactate 21.6
lactate level elevated

## 2021-08-12 NOTE — PROVIDER CONTACT NOTE (CRITICAL VALUE NOTIFICATION) - ACTION/TREATMENT ORDERED:
Will order Kayexalate and follow the protocol
will begin hyperkalemia protocol, orders to follow
Repeat lactate
repeat fingerstick to be done
Continue present care.

## 2021-08-12 NOTE — PROGRESS NOTE ADULT - SUBJECTIVE AND OBJECTIVE BOX
Seen in ICU, awake, weak    Vital Signs Last 24 Hrs  T(C): 37.1 (08-11-21 @ 20:00), Max: 37.2 (08-11-21 @ 10:00)  T(F): 98.8 (08-11-21 @ 20:00), Max: 99 (08-11-21 @ 10:00)  HR: 96 (08-11-21 @ 21:00) (86 - 119)  BP: 128/50 (08-11-21 @ 21:00) (86/40 - 131/52)  BP(mean): 70 (08-11-21 @ 21:00) (46 - 102)  RR: 21 (08-11-21 @ 21:00) (18 - 30)  SpO2: 96% (08-11-21 @ 21:00) (91% - 100%)    I&O's Detail    10 Aug 2021 07:01  -  11 Aug 2021 07:00  --------------------------------------------------------  IN:    Sodium Bicarbonate: 3000 mL  Total IN: 3000 mL    OUT:    Indwelling Catheter - Urethral (mL): 265 mL    Other (mL): 0 mL  Total OUT: 265 mL    Total NET: 2735 mL    11 Aug 2021 07:01  -  11 Aug 2021 21:43  --------------------------------------------------------  IN:    Albumin 25%  -  50 mL: 50 mL    IV PiggyBack: 749.9 mL    Oral Fluid: 120 mL    Sodium Bicarbonate: 150 mL  Total IN: 1069.9 mL    OUT:    Indwelling Catheter - Urethral (mL): 37 mL    Other (mL): 0 mL  Total OUT: 37 mL    Total NET: 1032.9 mL    card - s1s2  lungs - b/l air entry  abd - soft, ND  extr - sm edema  skin - jaundice                        10.8   15.07 )-----------( 147      ( 11 Aug 2021 05:30 )             33.2     11 Aug 2021 05:30    138    |  97     |  43     ----------------------------<  86     4.0     |  29     |  2.14     Ca    7.6        11 Aug 2021 05:30  Phos  4.0       11 Aug 2021 05:30  Mg     1.9       11 Aug 2021 05:30    TPro  5.6    /  Alb  2.3    /  TBili  12.9   /  DBili  x      /  AST  6400   /  ALT  3479   /  AlkPhos  817    11 Aug 2021 05:30    LIVER FUNCTIONS - ( 11 Aug 2021 05:30 )  Alb: 2.3 g/dL / Pro: 5.6 g/dL / ALK PHOS: 817 U/L / ALT: 3479 U/L / AST: 6400 U/L / GGT: x           PT/INR - ( 11 Aug 2021 05:30 )   PT: 33.0 sec;   INR: 2.87 ratio      Culture - Urine (collected 09 Aug 2021 13:05)  Source: Clean Catch Clean Catch (Midstream)  Preliminary Report (10 Aug 2021 21:21):    50,000 - 99,000 CFU/mL Enterococcus species    albumin human 25% IVPB 50 milliLiter(s) IV Intermittent every 6 hours  chlorhexidine 4% Liquid 1 Application(s) Topical <User Schedule>  midodrine. 5 milliGRAM(s) Oral three times a day  octreotide  Injectable 100 MICROGram(s) SubCutaneous every 8 hours  ondansetron Injectable 4 milliGRAM(s) IV Push every 8 hours PRN  sodium chloride 0.9% lock flush 10 milliLiter(s) IV Push every 1 hour PRN    A/P:    End stage metastatic colon ca  Liver failure, renal failure w/poor UO in setting of above  Lactic acidosis  Notes reviewed, patient is not a candidate for further cancer treatment  Prognosis is very poor overall  Long d/w pt and family  Patient and family are aware of poor prognosis, considering comfort care, but not ready to make a decision and would like a trial of HD  HD daily as tolerates  Close f/u of lytes, UO  Avoid nephrotoxins, hepatotoxins  Avoid hypotension  Tx for HRS  Pan-cx  D/w ICU team    [phone]
Follow-up Pall Progress Note    Joseph Boothe MD  [phone]    No new respiratory events overnight.      Medications:  Vital Signs Last 24 Hrs  T(C): 37.5 (12 Aug 2021 09:00), Max: 37.5 (12 Aug 2021 09:00)  T(F): 99.5 (12 Aug 2021 09:00), Max: 99.5 (12 Aug 2021 09:00)  HR: 102 (12 Aug 2021 14:00) (87 - 144)  BP: 92/54 (12 Aug 2021 14:00) (92/54 - 187/133)  BP(mean): 63 (12 Aug 2021 14:00) (53 - 148)  RR: 30 (12 Aug 2021 14:00) (21 - 45)  SpO2: 95% (12 Aug 2021 14:00) (92% - 97%)          08-11 @ 07:01  -  08-12 @ 07:00  --------------------------------------------------------  IN: 2007.1 mL / OUT: 377 mL / NET: 1630.1 mL        LABS:                        8.2    22.59 )-----------( 93       ( 12 Aug 2021 06:50 )             25.0     08-12    137  |  93<L>  |  36<H>  ----------------------------<  42<LL>  3.9   |  22  |  2.81<H>    Ca    7.2<L>      12 Aug 2021 06:25  Phos  5.6     08-12  Mg     2.0     08-12    TPro  4.5<L>  /  Alb  2.2<L>  /  TBili  13.5<H>  /  DBili  x   /  AST  7868<H>  /  ALT  2546<H>  /  AlkPhos  484<H>  08-12      PT/INR - ( 11 Aug 2021 05:30 )   PT: 33.0 sec;   INR: 2.87 ratio         PTT - ( 11 Aug 2021 05:30 )  PTT:31.1 sec        CULTURES:  Culture Results:   50,000 - 99,000 CFU/mL Enterococcus faecalis (08-09 @ 13:05)    Most recent blood culture -- 08-09 @ 13:05   Enterococcus faecalis Enterococcus faecalis Clean Catch Clean Catch (Midstream) 08-09 @ 13:05      Physical Examination:  PULM: Clear to auscultation bilaterally, no significant sputum production  CVS: Regular rate and rhythm, no murmurs, rubs, or gallops        
Seen in ICU    Vital Signs Last 24 Hrs  T(C): 37.5 (08-12-21 @ 09:00), Max: 37.5 (08-12-21 @ 09:00)  T(F): 99.5 (08-12-21 @ 09:00), Max: 99.5 (08-12-21 @ 09:00)  HR: 99 (08-12-21 @ 17:00) (96 - 144)  BP: 132/58 (08-12-21 @ 17:00) (92/54 - 187/133)  BP(mean): 74 (08-12-21 @ 17:00) (53 - 148)  RR: 41 (08-12-21 @ 17:00) (21 - 45)  SpO2: 95% (08-12-21 @ 17:00) (92% - 97%)    I&O's Detail    11 Aug 2021 07:01  -  12 Aug 2021 07:00  --------------------------------------------------------  IN:    Albumin 25%  -  50 mL: 150 mL    IV PiggyBack: 1587.1 mL    Oral Fluid: 120 mL    Sodium Bicarbonate: 150 mL  Total IN: 2007.1 mL    OUT:    Indwelling Catheter - Urethral (mL): 377 mL    Other (mL): 0 mL  Total OUT: 377 mL    Total NET: 1630.1 mL    12 Aug 2021 07:01  -  12 Aug 2021 18:20  --------------------------------------------------------  IN:    IV PiggyBack: 128.8 mL  Total IN: 128.8 mL    OUT:  Total OUT: 0 mL    Total NET: 128.8 mL    card - s1s2  lungs - b/l air entry  abd - soft, ND  extr - sm edema  skin - jaundice                                 8.2    22.59 )-----------( 93       ( 12 Aug 2021 06:50 )             25.0     12 Aug 2021 06:25    137    |  93     |  36     ----------------------------<  42     3.9     |  22     |  2.81     Ca    7.2        12 Aug 2021 06:25  Phos  5.6       12 Aug 2021 06:25  Mg     2.0       12 Aug 2021 06:25    TPro  4.5    /  Alb  2.2    /  TBili  13.5   /  DBili  x      /  AST  7868   /  ALT  2546   /  AlkPhos  484    12 Aug 2021 06:25    LIVER FUNCTIONS - ( 12 Aug 2021 06:25 )  Alb: 2.2 g/dL / Pro: 4.5 g/dL / ALK PHOS: 484 U/L / ALT: 2546 U/L / AST: 7868 U/L / GGT: x           PT/INR - ( 11 Aug 2021 05:30 )   PT: 33.0 sec;   INR: 2.87 ratio      albumin human 25% IVPB 50 milliLiter(s) IV Intermittent every 6 hours  aspirin  chewable 81 milliGRAM(s) Oral daily  chlorhexidine 4% Liquid 1 Application(s) Topical <User Schedule>  metoprolol tartrate 12.5 milliGRAM(s) Oral every 12 hours  octreotide  Injectable 100 MICROGram(s) SubCutaneous every 8 hours  ondansetron Injectable 4 milliGRAM(s) IV Push every 8 hours PRN  sodium chloride 0.9% lock flush 10 milliLiter(s) IV Push every 1 hour PRN    A/P:    End stage metastatic colon ca  Liver failure, renal failure w/poor UO in setting of above  Lactic acidosis  Notes reviewed, patient is not a candidate for further cancer treatment  Prognosis is very poor overall  Long d/w pt and family  No further HD per family wishes  Plan for hospice    [phone]
Patient is a 71y old  Male who presents with a chief complaint of B/L LE swelling (10 Aug 2021 19:26)      BRIEF HOSPITAL COURSE:     71M w/ colon CA, mets to liver, transferred from medical floor to ICU after labs revealed severe metabolic acidosis, acute renal failure, and patient was deemed candidate for emergent hemodilaysis as his K+ level renee to >7 with EKG changes.     Events last 24 hours:   -patient Now S/P HD, rpeat labs show amrked improvement.    K+ 7.3 --> 3.9  Serum CO2 <5 --> 25   Scr 2.7 --> 1.7   lactate 21.6 --> 10.1    -patient remains on bicarb drip  will need repeat ABG, pH prior to HD was 7.09     PAST MEDICAL & SURGICAL HISTORY:  Hypertension    CVA (cerebral vascular accident)   -- wife and patient state no residual    Hyperlipidemia    Osteoarthritis    Carotid artery occlusion  left side in 2013  Left CEA 2013    Arthropathy  left hip &gt; right hip    Colon cancer  had surgery in 2014 with mets to liver-- received chemotherapy    Liver cancer  METS from the colon -- has received chemotherapy    Depression  sts was depressed &quot;I lost my brother while going to this&quot;    Reflux    Clostridium difficile infection  on short term vancomyacin    Snoring  DRWE precautions -- responds affirmatively to STOP BANG questionnaire -- admits to loud snoring; age &gt; 50; h/o htn; gender, male; h/o htn    Lumbar stenosis    DJD (degenerative joint disease)    Atherosclerosis    Cataract of left eye    Lung metastasis    S/P carotid endarterectomy  2013  left side    H/O skin graft      Colon cancer  diagnosed in 2014 with subsequent colon cancer followed with chemotherapy    Colon cancer  [facility]  for chemotherapy due to colon cancer with METS to liver    H/O resection of liver          Review of Systems:  CONSTITUTIONAL: No fever, chills, or fatigue  EYES: No eye pain, visual disturbances, or discharge  ENMT:  No difficulty hearing, tinnitus, vertigo; No sinus or throat pain  NECK: No pain or stiffness  RESPIRATORY: No cough, wheezing, chills or hemoptysis; No shortness of breath  CARDIOVASCULAR: No chest pain, palpitations, dizziness, or leg swelling  GASTROINTESTINAL: No abdominal or epigastric pain. No nausea, vomiting, or hematemesis; No diarrhea or constipation. No melena or hematochezia.  GENITOURINARY: No dysuria, frequency, hematuria, or incontinence  NEUROLOGICAL: No headaches, memory loss, loss of strength, numbness, or tremors  SKIN: No itching, burning, rashes, or lesions   MUSCULOSKELETAL: No joint pain or swelling; No muscle, back, or extremity pain  PSYCHIATRIC: No depression, anxiety, mood swings, or difficulty sleeping      Medications:    midodrine. 5 milliGRAM(s) Oral three times a day      ondansetron Injectable 4 milliGRAM(s) IV Push every 8 hours PRN              sodium bicarbonate  Infusion 0.366 mEq/kG/Hr IV Continuous <Continuous>  sodium chloride 0.9% lock flush 10 milliLiter(s) IV Push every 1 hour PRN      chlorhexidine 4% Liquid 1 Application(s) Topical <User Schedule>            ICU Vital Signs Last 24 Hrs  T(C): 36.6 (10 Aug 2021 18:30), Max: 36.6 (10 Aug 2021 18:30)  T(F): 97.8 (10 Aug 2021 18:30), Max: 97.8 (10 Aug 2021 18:30)  HR: 90 (10 Aug 2021 20:00) (86 - 107)  BP: 105/40 (10 Aug 2021 20:00) (90/32 - 125/40)  BP(mean): 56 (10 Aug 2021 20:00) (44 - 73)  RR: 20 (10 Aug 2021 20:00) (17 - 30)  SpO2: 99% (10 Aug 2021 20:00) (97% - 100%)      ABG - ( 10 Aug 2021 10:05 )  pH, Arterial: 7.09  pH, Blood: x     /  pCO2: <19   /  pO2: 110   / HCO3: 3     / Base Excess: -26.5 /  SaO2: 97.8                I&O's Detail    10 Aug 2021 07:01  -  10 Aug 2021 20:49  --------------------------------------------------------  IN:    Sodium Bicarbonate: 1050 mL  Total IN: 1050 mL    OUT:    Indwelling Catheter - Urethral (mL): 160 mL    Other (mL): 0 mL  Total OUT: 160 mL    Total NET: 890 mL            LABS:                        10.9   18.67 )-----------( 298      ( 10 Aug 2021 05:50 )             36.8     08-10    139  |  100  |  35<H>  ----------------------------<  149<H>  3.9   |  25  |  1.70<H>    Ca    7.8<L>      10 Aug 2021 20:00    TPro  5.1<L>  /  Alb  2.0<L>  /  TBili  9.2<H>  /  DBili  8.0<H>  /  AST  4252<H>  /  ALT  940<H>  /  AlkPhos  479<H>  08-10      CARDIAC MARKERS ( 10 Aug 2021 13:15 )  .106 ng/mL / x     / x     / x     / x          CAPILLARY BLOOD GLUCOSE      POCT Blood Glucose.: 175 mg/dL (10 Aug 2021 17:29)    PT/INR - ( 10 Aug 2021 13:15 )   PT: 31.4 sec;   INR: 2.72 ratio         PTT - ( 10 Aug 2021 13:15 )  PTT:35.5 sec  Urinalysis Basic - ( 09 Aug 2021 13:05 )    Color: [place]err[place] / Appearance: Clear / S.020 / pH: x  Gluc: x / Ketone: Negative  / Bili: Moderate / Urobili: 8   Blood: x / Protein: 30 mg/dL / Nitrite: Negative   Leuk Esterase: Trace / RBC: 0-4 /HPF / WBC 0-2 /HPF   Sq Epi: x / Non Sq Epi: Neg.-Few / Bacteria: Trace /HPF      CULTURES:  Rapid RVP Result: NotDetec (21 @ 12:00)      Physical Examination:    General: No acute distress.  Alert, oriented, interactive, nonfocal    HEENT: Pupils equal, reactive to light.  Symmetric.    PULM: Clear to auscultation bilaterally, no significant sputum production    CVS: Regular rate and rhythm, no murmurs, rubs, or gallops    ABD: Soft, nondistended, nontender, normoactive bowel sounds, no masses    EXT: No edema, nontender    SKIN: Warm and well perfused, no rashes noted.    RADIOLOGY: ***    CRITICAL CARE TIME SPENT:  minutes of critical care time spent providing medical care for patient's acute illness/conditions that impairs at least one vital organ system and/or poses a high risk of imminent or life threatening deterioration in the patient's condition. It includes time spent evaluating and treating the patient's acute illness as well as time spent reviewing labs, radiology, discussing goals of care with patient and/or patient's family, and discussing the case with a multidisciplinary team, including the eICU, in an effort to prevent further life threatening deterioration or end organ damage. This time is independent of any procedures performed.      
Patient is a 71y old  Male who presents with a chief complaint of B/L LE swelling (11 Aug 2021 19:33)      BRIEF HOSPITAL COURSE:     71M w/ colon CA, mets to liver, transferred from medical floor to ICU after labs revealed severe metabolic acidosis, acute renal failure, and patient was deemed candidate for emergent hemodilaysis as his K+ level renee to >7 with EKG changes.     Events last 24 hours:     -now post second HD session today  -patient is now DNR/DNI    PAST MEDICAL & SURGICAL HISTORY:  Hypertension    CVA (cerebral vascular accident)  2005 -- wife and patient state no residual    Hyperlipidemia    Osteoarthritis    Carotid artery occlusion  left side in November 2013  Left CEA 11/2013    Arthropathy  left hip &gt; right hip    Colon cancer  had surgery in March 2014 with mets to liver-- received chemotherapy    Liver cancer  METS from the colon -- has received chemotherapy    Depression  sts was depressed &quot;I lost my brother while going to this&quot;    Reflux    Clostridium difficile infection  on short term vancomyacin    Snoring  DREW precautions -- responds affirmatively to STOP BANG questionnaire -- admits to loud snoring; age &gt; 50; h/o htn; gender, male; h/o htn    Lumbar stenosis    DJD (degenerative joint disease)    Atherosclerosis    Cataract of left eye    Lung metastasis    S/P carotid endarterectomy  11/2013  left side    H/O skin graft  1984    Colon cancer  diagnosed in March 2014 with subsequent colon cancer followed with chemotherapy    Colon cancer  [facility] 2014 for chemotherapy due to colon cancer with METS to liver    H/O resection of liver  2015        Review of Systems:  CONSTITUTIONAL: No fever, chills, or fatigue  EYES: No eye pain, visual disturbances, or discharge  ENMT:  No difficulty hearing, tinnitus, vertigo; No sinus or throat pain  NECK: No pain or stiffness  RESPIRATORY: No cough, wheezing, chills or hemoptysis; No shortness of breath  CARDIOVASCULAR: No chest pain, palpitations, dizziness, or leg swelling  GASTROINTESTINAL: No abdominal or epigastric pain. No nausea, vomiting, or hematemesis; No diarrhea or constipation. No melena or hematochezia.  GENITOURINARY: No dysuria, frequency, hematuria, or incontinence  NEUROLOGICAL: No headaches, memory loss, loss of strength, numbness, or tremors  SKIN: No itching, burning, rashes, or lesions   MUSCULOSKELETAL: No joint pain or swelling; No muscle, back, or extremity pain  PSYCHIATRIC: No depression, anxiety, mood swings, or difficulty sleeping      Medications:    midodrine. 5 milliGRAM(s) Oral three times a day      ondansetron Injectable 4 milliGRAM(s) IV Push every 8 hours PRN            octreotide  Injectable 100 MICROGram(s) SubCutaneous every 8 hours    albumin human 25% IVPB 50 milliLiter(s) IV Intermittent every 6 hours  sodium chloride 0.9% lock flush 10 milliLiter(s) IV Push every 1 hour PRN      chlorhexidine 4% Liquid 1 Application(s) Topical <User Schedule>            ICU Vital Signs Last 24 Hrs  T(C): 36.7 (11 Aug 2021 18:00), Max: 37.2 (11 Aug 2021 10:00)  T(F): 98.1 (11 Aug 2021 18:00), Max: 99 (11 Aug 2021 10:00)  HR: 97 (11 Aug 2021 19:00) (86 - 119)  BP: 131/52 (11 Aug 2021 19:00) (85/35 - 131/52)  BP(mean): 71 (11 Aug 2021 19:00) (46 - 102)  RR: 28 (11 Aug 2021 19:00) (18 - 30)  SpO2: 96% (11 Aug 2021 19:00) (91% - 100%)      ABG - ( 10 Aug 2021 10:05 )  pH, Arterial: 7.09  pH, Blood: x     /  pCO2: <19   /  pO2: 110   / HCO3: 3     / Base Excess: -26.5 /  SaO2: 97.8                I&O's Detail    10 Aug 2021 07:01  -  11 Aug 2021 07:00  --------------------------------------------------------  IN:    Sodium Bicarbonate: 3000 mL  Total IN: 3000 mL    OUT:    Indwelling Catheter - Urethral (mL): 265 mL    Other (mL): 0 mL  Total OUT: 265 mL    Total NET: 2735 mL      11 Aug 2021 07:01  -  11 Aug 2021 20:02  --------------------------------------------------------  IN:    Albumin 25%  -  50 mL: 50 mL    IV PiggyBack: 749.9 mL    Oral Fluid: 120 mL    Sodium Bicarbonate: 150 mL  Total IN: 1069.9 mL    OUT:    Indwelling Catheter - Urethral (mL): 37 mL    Other (mL): 0 mL  Total OUT: 37 mL    Total NET: 1032.9 mL            LABS:                        10.8   15.07 )-----------( 147      ( 11 Aug 2021 05:30 )             33.2     08-11    138  |  97  |  43<H>  ----------------------------<  86  4.0   |  29  |  2.14<H>    Ca    7.6<L>      11 Aug 2021 05:30  Phos  4.0     08-11  Mg     1.9     08-11    TPro  5.6<L>  /  Alb  2.3<L>  /  TBili  12.9<H>  /  DBili  x   /  AST  6400<H>  /  ALT  3479<H>  /  AlkPhos  817<H>  08-11      CARDIAC MARKERS ( 11 Aug 2021 05:30 )  .171 ng/mL / x     / x     / x     / x      CARDIAC MARKERS ( 10 Aug 2021 13:15 )  .106 ng/mL / x     / x     / x     / x          CAPILLARY BLOOD GLUCOSE      POCT Blood Glucose.: 175 mg/dL (10 Aug 2021 17:29)    PT/INR - ( 11 Aug 2021 05:30 )   PT: 33.0 sec;   INR: 2.87 ratio         PTT - ( 11 Aug 2021 05:30 )  PTT:31.1 sec    CULTURES:  Culture Results:   50,000 - 99,000 CFU/mL Enterococcus species (08-09-21 @ 13:05)  Rapid RVP Result: NotDetec (08-09-21 @ 12:00)      Physical Examination:    General: No acute distress.  Alert, oriented, interactive, nonfocal    HEENT: Pupils equal, reactive to light.  Symmetric.    PULM: Clear to auscultation bilaterally, no significant sputum production    CVS: Regular rate and rhythm, no murmurs, rubs, or gallops    ABD: Soft, nondistended, nontender, normoactive bowel sounds, no masses    EXT: No edema, nontender    SKIN: Warm and well perfused, no rashes noted.        
Follow-up Critical Care Progress Note  Chief Complaint : Jaundice      patient seen and examined  feels well  wants to get out of bed.        Allergies :No Known Allergies      PAST MEDICAL & SURGICAL HISTORY:  Hypertension    CVA (cerebral vascular accident)  2005 -- wife and patient state no residual    Hyperlipidemia    Osteoarthritis    Carotid artery occlusion  left side in November 2013  Left CEA 11/2013    Arthropathy  left hip &gt; right hip    Colon cancer  had surgery in March 2014 with mets to liver-- received chemotherapy    Liver cancer  METS from the colon -- has received chemotherapy    Depression  sts was depressed &quot;I lost my brother while going to this&quot;    Reflux    Clostridium difficile infection  on short term vancomyacin    Snoring  DREW precautions -- responds affirmatively to STOP BANG questionnaire -- admits to loud snoring; age &gt; 50; h/o htn; gender, male; h/o htn    Lumbar stenosis    DJD (degenerative joint disease)    Atherosclerosis    Cataract of left eye    Lung metastasis    S/P carotid endarterectomy  11/2013  left side    H/O skin graft  1984    Colon cancer  diagnosed in March 2014 with subsequent colon cancer followed with chemotherapy    Colon cancer  [facility] 2014 for chemotherapy due to colon cancer with METS to liver    H/O resection of liver  2015        Medications:  MEDICATIONS  (STANDING):  albumin human 25% IVPB 50 milliLiter(s) IV Intermittent every 6 hours  chlorhexidine 4% Liquid 1 Application(s) Topical <User Schedule>  midodrine. 5 milliGRAM(s) Oral three times a day  octreotide  Injectable 100 MICROGram(s) SubCutaneous every 8 hours    MEDICATIONS  (PRN):  ondansetron Injectable 4 milliGRAM(s) IV Push every 8 hours PRN Nausea and/or Vomiting  sodium chloride 0.9% lock flush 10 milliLiter(s) IV Push every 1 hour PRN Pre/post blood products, medications, blood draw, and to maintain line patency    COVID  08-09-21 @ 12:00  COVID -   Wilic  01-07-21 @ 14:30  COVID -   Detected<!!>  10-03-20 @ 15:15  COVID -   NotBoc       Trend Cardiac Enzymes  08-11-21 @ 05:30  JMQ-[id number]-LMGKY-CPKMM/Trop I - -- - --  - --  -  --  /  .171<H>  08-10-21 @ 13:15  [id number]-[id number]-[id number]-CPKMM/Trop I - -- - --  - --  -  --  /  .106<H>    Trend BNP  08-09-21 @ 11:20   -  282  01-07-21 @ 14:30   -  332<H>    Procalcitonin Trend  01-18-21 @ 06:15   -   0.05  01-07-21 @ 14:30   -   0.15<H>    WBC Trend  08-12-21 @ 06:50   -  22.59<H>  08-11-21 @ 05:30   -  15.07<H>  08-10-21 @ 05:50   -  18.67<H>  08-09-21 @ 11:20   -  12.23<H>    H/H Trend  08-12-21 @ 06:50   -   8.2<L>/ 25.0<L>  08-11-21 @ 05:30   -   10.8<L>/ 33.2<L>  08-10-21 @ 05:50   -   10.9<L>/ 36.8<L>  08-09-21 @ 11:20   -   10.3<L>/ 33.2<L>  07-29-21 @ 15:24   -   8.8<L>/ 28.8<L>    Stool Occult Blood    Platelet Trend  08-12-21 @ 06:50   -  93<L>  08-11-21 @ 05:30   -  147<L>  08-10-21 @ 05:50   -  298  08-09-21 @ 11:20   -  241    Trend Sodium  08-12-21 @ 06:25   -  137  08-11-21 @ 05:30   -  138  08-10-21 @ 20:00   -  139  08-10-21 @ 08:50   -  132<L>  08-10-21 @ 05:50   -  136  08-09-21 @ 11:20   -  135    Trend Potassium  08-12-21 @ 06:25   -  3.9  08-11-21 @ 05:30   -  4.0  08-10-21 @ 20:00   -  3.9  08-10-21 @ 08:50   -  7.3<HH>  08-10-21 @ 05:50   -  6.6<HH>  08-09-21 @ 11:20   -  5.6<H>    Trend Bun/Cr  08-12-21 @ 06:25  BUN/CR -  36<H> / 2.81<H>  08-11-21 @ 05:30  BUN/CR -  43<H> / 2.14<H>  08-10-21 @ 20:00  BUN/CR -  35<H> / 1.70<H>  08-10-21 @ 08:50  BUN/CR -  66<H> / 2.70<H>  08-10-21 @ 05:50  BUN/CR -  64<H> / 2.38<H>  08-09-21 @ 11:20  BUN/CR -  50<H> / 1.70<H>    Lactic Acid Trend  08-11-21 @ 05:30   -   9.6<HH>  08-10-21 @ 20:00   -   10.1<HH>  08-10-21 @ 11:30   -   21.6<HH>    ABG Trend  08-10-21 @ 10:05   - 7.09<LL>/<19<L>/110<H>/97.8  02-05-15 @ 02:37   - 7.39/34/78/95    Trend AST/ALT/ALK Phos/Bili  08-12-21 @ 06:25   --/--/484<H>/13.5<H>  08-11-21 @ 05:30   6400<H>/3479<H>/817<H>/12.9<H>  08-10-21 @ 08:50   4252<H>/940<H>/479<H>/9.2<H>  08-10-21 @ 05:50   3023<H>/845<H>/562<H>/11.2<H>  08-09-21 @ 11:20   577<H>/238<H>/550<H>/7.3<H>  01-19-21 @ 06:45   29/41/90/0.5  01-18-21 @ 06:15   86<H>/51<H>/100/0.7  01-17-21 @ 07:16   32/46<H>/83/0.5  01-16-21 @ 07:10   32/37/74/0.4  01-15-21 @ 05:30   27/35/76/0.4  01-14-21 @ 07:15   24/34/58/0.3  01-13-21 @ 07:19   30/38/59/0.4      Ammonia Trend  08-11-21 @ 05:30   -   17  08-10-21 @ 13:15   -   56<H>  08-09-21 @ 11:10   -   58<H>      Amylase / Lipase Trend  08-09-21 @ 11:20   -   -- / 284      Albumin Trend  08-12-21 @ 06:25   -   2.2<L>  08-11-21 @ 05:30   -   2.3<L>  08-10-21 @ 08:50   -   2.0<L>  08-10-21 @ 05:50   -   2.3<L>  08-09-21 @ 11:20   -   2.1<L>  01-19-21 @ 06:45   -   2.2<L>      PTT - PT - INR Trend  08-11-21 @ 05:30   -   31.1 - 33.0<H> - 2.87<H>  08-10-21 @ 13:15   -   35.5 - 31.4<H> - 2.72<H>  08-09-21 @ 11:20   -   31.3 - 16.9<H> - 1.42<H>  01-07-21 @ 14:30   -   29.5 - 15.4<H> - 1.29<H>  10-06-20 @ 10:12   -   -- - 12.8 - 1.10    Glucose Trend  08-11-21 @ 05:30   -  -- 86 --  08-10-21 @ 20:00   -  -- 149<H> --  08-10-21 @ 17:29   -  -- -- 175<H>  08-10-21 @ 12:56   -  -- -- 146<H>  08-10-21 @ 09:02   -  -- -- 162<H>  08-10-21 @ 08:50   -  -- 309<H> --  08-10-21 @ 08:28   -  -- -- 50<LL>  08-10-21 @ 05:50   -  -- 40<LL> --  08-09-21 @ 11:20   -  -- 138<H> --        LABS:                        8.2    22.59 )-----------( 93       ( 12 Aug 2021 06:50 )             25.0     08-12    137  |  93<L>  |  36<H>  ----------------------------<  x   3.9   |  22  |  2.81<H>    Ca    7.2<L>      12 Aug 2021 06:25  Phos  5.6     08-12  Mg     2.0     08-12    TPro  4.5<L>  /  Alb  2.2<L>  /  TBili  13.5<H>  /  DBili  x   /  AST  x   /  ALT  x   /  AlkPhos  484<H>  08-12           CULTURES: (if applicable)    Culture - Urine (collected 08-09-21 @ 13:05)  Source: Clean Catch Clean Catch (Midstream)  Final Report (08-11-21 @ 21:55):    50,000 - 99,000 CFU/mL Enterococcus faecalis  Organism: Enterococcus faecalis (08-11-21 @ 21:55)  Organism: Enterococcus faecalis (08-11-21 @ 21:55)      -  Ampicillin: S <=2 Predicts results to ampicillin/sulbactam, amoxacillin-clavulanate and  piperacillin-tazobactam.      -  Ciprofloxacin: S <=1      -  Levofloxacin: S <=1      -  Nitrofurantoin: S <=32 Should not be used to treat pyelonephritis.      -  Tetra/Doxy: R >8      -  Vancomycin: S 2      Method Type: JEANCARLOS      Rapid RVP Result: NotDetec (08-09-21 @ 12:00)        VITALS:  T(C): 36.5 (08-12-21 @ 04:00), Max: 37.2 (08-11-21 @ 10:00)  T(F): 97.7 (08-12-21 @ 04:00), Max: 99 (08-11-21 @ 10:00)  HR: 100 (08-12-21 @ 07:00) (87 - 132)  BP: 106/39 (08-12-21 @ 07:00) (89/43 - 151/58)  BP(mean): 56 (08-12-21 @ 07:00) (46 - 80)  ABP: --  ABP(mean): --  RR: 29 (08-12-21 @ 07:00) (20 - 31)  SpO2: 93% (08-12-21 @ 07:00) (91% - 100%)  CVP(mm Hg): --  CVP(cm H2O): --    Ins and Outs     08-11-21 @ 07:01  -  08-12-21 @ 07:00  --------------------------------------------------------  IN: 2007.1 mL / OUT: 377 mL / NET: 1630.1 mL        Height (cm): 167.6 (08-09-21 @ 16:54)  Weight (kg): 63.3 (08-10-21 @ 11:00)  BMI (kg/m2): 22.5 (08-10-21 @ 11:00)        I&O's Detail    11 Aug 2021 07:01  -  12 Aug 2021 07:00  --------------------------------------------------------  IN:    Albumin 25%  -  50 mL: 150 mL    IV PiggyBack: 1587.1 mL    Oral Fluid: 120 mL    Sodium Bicarbonate: 150 mL  Total IN: 2007.1 mL    OUT:    Indwelling Catheter - Urethral (mL): 377 mL    Other (mL): 0 mL  Total OUT: 377 mL    Total NET: 1630.1 mL           
Follow-up Pall Progress Note    Joseph Boothe MD  [phone]    No new respiratory events overnight.  Denies pain  Medications:  Vital Signs Last 24 Hrs  T(C): 37.2 (11 Aug 2021 10:00), Max: 37.2 (11 Aug 2021 10:00)  T(F): 99 (11 Aug 2021 10:00), Max: 99 (11 Aug 2021 10:00)  HR: 97 (11 Aug 2021 12:00) (86 - 119)  BP: 90/42 (11 Aug 2021 12:00) (85/35 - 129/44)  BP(mean): 52 (11 Aug 2021 12:00) (44 - 102)  RR: 25 (11 Aug 2021 12:00) (17 - 30)  SpO2: 100% (11 Aug 2021 12:00) (91% - 100%)    ABG - ( 10 Aug 2021 10:05 )  pH, Arterial: 7.09  pH, Blood: x     /  pCO2: <19   /  pO2: 110   / HCO3: 3     / Base Excess: -26.5 /  SaO2: 97.8                  08-10 @ 07:01  -  08-11 @ 07:00  --------------------------------------------------------  IN: 3000 mL / OUT: 265 mL / NET: 2735 mL        LABS:                        10.8   15.07 )-----------( 147      ( 11 Aug 2021 05:30 )             33.2     08-11    138  |  97  |  43<H>  ----------------------------<  86  4.0   |  29  |  2.14<H>    Ca    7.6<L>      11 Aug 2021 05:30  Phos  4.0     08-11  Mg     1.9     08-11    TPro  5.6<L>  /  Alb  2.3<L>  /  TBili  12.9<H>  /  DBili  x   /  AST  6400<H>  /  ALT  3479<H>  /  AlkPhos  817<H>  08-11      PT/INR - ( 11 Aug 2021 05:30 )   PT: 33.0 sec;   INR: 2.87 ratio         PTT - ( 11 Aug 2021 05:30 )  PTT:31.1 sec    Serum Pro-Brain Natriuretic Peptide: 282 pg/mL (08-09-21 @ 11:20)      CULTURES:  Culture Results:   50,000 - 99,000 CFU/mL Enterococcus species (08-09 @ 13:05)    Most recent blood culture -- 08-09 @ 13:05   -- -- Clean Catch Clean Catch (Midstream) 08-09 @ 13:05      Physical Examination:  PULM: Clear to auscultation bilaterally, no significant sputum production  CVS: Regular rate and rhythm, no murmurs, rubs, or gallops  Icteric    RADIOLOGY REVIEWED  CXR:    CT chest:    TTE:  
Follow-up Critical Care Progress Note  Chief Complaint : Jaundice    patient Alert, comfortable  did not make much urine yesterday  plan for HD again today  tolerating oral diet  comfortable.       Allergies :No Known Allergies      PAST MEDICAL & SURGICAL HISTORY:  Hypertension    CVA (cerebral vascular accident)  2005 -- wife and patient state no residual    Hyperlipidemia    Osteoarthritis    Carotid artery occlusion  left side in November 2013  Left CEA 11/2013    Arthropathy  left hip &gt; right hip    Colon cancer  had surgery in March 2014 with mets to liver-- received chemotherapy    Liver cancer  METS from the colon -- has received chemotherapy    Depression  sts was depressed &quot;I lost my brother while going to this&quot;    Reflux    Clostridium difficile infection  on short term vancomyacin    Snoring  DREW precautions -- responds affirmatively to STOP BANG questionnaire -- admits to loud snoring; age &gt; 50; h/o htn; gender, male; h/o htn    Lumbar stenosis    DJD (degenerative joint disease)    Atherosclerosis    Cataract of left eye    Lung metastasis    S/P carotid endarterectomy  11/2013  left side    H/O skin graft  1984    Colon cancer  diagnosed in March 2014 with subsequent colon cancer followed with chemotherapy    Colon cancer  [facility] 2014 for chemotherapy due to colon cancer with METS to liver    H/O resection of liver  2015        Medications:  MEDICATIONS  (STANDING):  chlorhexidine 4% Liquid 1 Application(s) Topical <User Schedule>  midodrine. 5 milliGRAM(s) Oral three times a day    MEDICATIONS  (PRN):  ondansetron Injectable 4 milliGRAM(s) IV Push every 8 hours PRN Nausea and/or Vomiting  sodium chloride 0.9% lock flush 10 milliLiter(s) IV Push every 1 hour PRN Pre/post blood products, medications, blood draw, and to maintain line patency    COVID  08-09-21 @ 12:00  COVID -   NotDetec  01-07-21 @ 14:30  COVID -   Detected<!!>  10-03-20 @ 15:15  COVID -   NotDetec     Trend Cardiac Enzymes  08-11-21 @ 05:30  IYN-VBOMX-WJVLJ-CPKMM/Trop I - -- - --  - --  -  --  /  .171<H>  08-10-21 @ 13:15  VOO-EYKXJ-TBSED-CPKMM/Trop I - -- - --  - --  -  --  /  .106<H>    Trend BNP  08-09-21 @ 11:20   -  282  01-07-21 @ 14:30   -  332<H>    Procalcitonin Trend  01-18-21 @ 06:15   -   0.05  01-07-21 @ 14:30   -   0.15<H>    WBC Trend  08-11-21 @ 05:30   -  15.07<H>  08-10-21 @ 05:50   -  18.67<H>  08-09-21 @ 11:20   -  12.23<H>    H/H Trend  08-11-21 @ 05:30   -   10.8<L>/ 33.2<L>  08-10-21 @ 05:50   -   10.9<L>/ 36.8<L>  08-09-21 @ 11:20   -   10.3<L>/ 33.2<L>  07-29-21 @ 15:24   -   8.8<L>/ 28.8<L>    Stool Occult Blood    Platelet Trend  08-11-21 @ 05:30   -  147<L>  08-10-21 @ 05:50   -  298  08-09-21 @ 11:20   -  241    Trend Sodium  08-11-21 @ 05:30   -  138  08-10-21 @ 20:00   -  139  08-10-21 @ 08:50   -  132<L>  08-10-21 @ 05:50   -  136  08-09-21 @ 11:20   -  135    Trend Potassium  08-11-21 @ 05:30   -  4.0  08-10-21 @ 20:00   -  3.9  08-10-21 @ 08:50   -  7.3<HH>  08-10-21 @ 05:50   -  6.6<HH>  08-09-21 @ 11:20   -  5.6<H>    Trend Bun/Cr  08-11-21 @ 05:30  BUN/CR -  43<H> / 2.14<H>  08-10-21 @ 20:00  BUN/CR -  35<H> / 1.70<H>  08-10-21 @ 08:50  BUN/CR -  66<H> / 2.70<H>  08-10-21 @ 05:50  BUN/CR -  64<H> / 2.38<H>  08-09-21 @ 11:20  BUN/CR -  50<H> / 1.70<H>    Lactic Acid Trend  08-11-21 @ 05:30   -   9.6<HH>  08-10-21 @ 20:00   -   10.1<HH>  08-10-21 @ 11:30   -   21.6<HH>    ABG Trend  08-10-21 @ 10:05   - 7.09<LL>/<19<L>/110<H>/97.8  02-05-15 @ 02:37   - 7.39/34/78/95    Trend AST/ALT/ALK Phos/Bili  08-11-21 @ 05:30   6400<H>/3479<H>/817<H>/12.9<H>  08-10-21 @ 08:50   4252<H>/940<H>/479<H>/9.2<H>  08-10-21 @ 05:50   3023<H>/845<H>/562<H>/11.2<H>  08-09-21 @ 11:20   577<H>/238<H>/550<H>/7.3<H>  01-19-21 @ 06:45   29/41/90/0.5  01-18-21 @ 06:15   86<H>/51<H>/100/0.7  01-17-21 @ 07:16   32/46<H>/83/0.5  01-16-21 @ 07:10   32/37/74/0.4  01-15-21 @ 05:30   27/35/76/0.4  01-14-21 @ 07:15   24/34/58/0.3  01-13-21 @ 07:19   30/38/59/0.4  01-12-21 @ 06:45   35/40/58/0.4      Ammonia Trend  08-11-21 @ 05:30   -   17  08-10-21 @ 13:15   -   56<H>  08-09-21 @ 11:10   -   58<H>      Amylase / Lipase Trend  08-09-21 @ 11:20   -   -- / 284      Albumin Trend  08-11-21 @ 05:30   -   2.3<L>  08-10-21 @ 08:50   -   2.0<L>  08-10-21 @ 05:50   -   2.3<L>  08-09-21 @ 11:20   -   2.1<L>  01-19-21 @ 06:45   -   2.2<L>  01-18-21 @ 06:15   -   2.8<L>      PTT - PT - INR Trend  08-11-21 @ 05:30   -   31.1 - 33.0<H> - 2.87<H>  08-10-21 @ 13:15   -   35.5 - 31.4<H> - 2.72<H>  08-09-21 @ 11:20   -   31.3 - 16.9<H> - 1.42<H>  01-07-21 @ 14:30   -   29.5 - 15.4<H> - 1.29<H>  10-06-20 @ 10:12   -   -- - 12.8 - 1.10    Glucose Trend  08-11-21 @ 05:30   -  -- 86 --  08-10-21 @ 20:00   -  -- 149<H> --  08-10-21 @ 17:29   -  -- -- 175<H>  08-10-21 @ 12:56   -  -- -- 146<H>  08-10-21 @ 09:02   -  -- -- 162<H>  08-10-21 @ 08:50   -  -- 309<H> --  08-10-21 @ 08:28   -  -- -- 50<LL>  08-10-21 @ 05:50   -  -- 40<LL> --  08-09-21 @ 11:20   -  -- 138<H> --        LABS:                        10.8   15.07 )-----------( 147      ( 11 Aug 2021 05:30 )             33.2     08-11    138  |  97  |  43<H>  ----------------------------<  86  4.0   |  29  |  2.14<H>    Ca    7.6<L>      11 Aug 2021 05:30  Phos  4.0     08-11  Mg     1.9     08-11    TPro  5.6<L>  /  Alb  2.3<L>  /  TBili  12.9<H>  /  DBili  x   /  AST  6400<H>  /  ALT  3479<H>  /  AlkPhos  817<H>  08-11           CULTURES: (if applicable)    Culture - Urine (collected 08-09-21 @ 13:05)  Source: Clean Catch Clean Catch (Midstream)  Preliminary Report (08-10-21 @ 21:21):    50,000 - 99,000 CFU/mL Enterococcus species      Rapid RVP Result: NotDetec (08-09-21 @ 12:00)      ABG - ( 10 Aug 2021 10:05 )  pH, Arterial: 7.09  pH, Blood: x     /  pCO2: <19   /  pO2: 110   / HCO3: 3     / Base Excess: -26.5 /  SaO2: 97.8             VITALS:  T(C): 37.2 (08-11-21 @ 10:00), Max: 37.2 (08-11-21 @ 10:00)  T(F): 99 (08-11-21 @ 10:00), Max: 99 (08-11-21 @ 10:00)  HR: 96 (08-11-21 @ 10:00) (86 - 119)  BP: 89/43 (08-11-21 @ 10:00) (85/35 - 129/44)  BP(mean): 51 (08-11-21 @ 08:00) (44 - 102)  ABP: --  ABP(mean): --  RR: 20 (08-11-21 @ 10:00) (17 - 30)  SpO2: 96% (08-11-21 @ 10:00) (92% - 100%)  CVP(mm Hg): --  CVP(cm H2O): --    Ins and Outs     08-10-21 @ 07:01  -  08-11-21 @ 07:00  --------------------------------------------------------  IN: 3000 mL / OUT: 265 mL / NET: 2735 mL    08-11-21 @ 07:01  -  08-11-21 @ 11:34  --------------------------------------------------------  IN: 150 mL / OUT: 5 mL / NET: 145 mL        Height (cm): 167.6 (08-09-21 @ 16:54)  Weight (kg): 63.3 (08-10-21 @ 11:00)  BMI (kg/m2): 22.5 (08-10-21 @ 11:00)        I&O's Detail    10 Aug 2021 07:01  -  11 Aug 2021 07:00  --------------------------------------------------------  IN:    Sodium Bicarbonate: 3000 mL  Total IN: 3000 mL    OUT:    Indwelling Catheter - Urethral (mL): 265 mL    Other (mL): 0 mL  Total OUT: 265 mL    Total NET: 2735 mL      11 Aug 2021 07:01  -  11 Aug 2021 11:34  --------------------------------------------------------  IN:    Sodium Bicarbonate: 150 mL  Total IN: 150 mL    OUT:    Indwelling Catheter - Urethral (mL): 5 mL  Total OUT: 5 mL    Total NET: 145 mL       
MORALES MORILLO   71y   Male    Admitting: DENG Moreau  HPI:  70yo male with hx of Metastatic Colon Adenocarcinoma diagnosed 10years ago was in remission, HTN, symptomatic COVID-19 infection in early 2021, presented to the ED with complaints of jaundice associated with b/l LE Swelling. Pt accompanied by his wife who is also providing hx. Per both of them, pt was noted on routine follow up to be significant anemic a few weeks ago. He was scheduled for 2unit PRBC transfusion. Since the transfusion, pt states he has noted b/l le swelling as well as his belly. He denies any pain or discomfort. Pt or family cannot recall how long he has been noted to be yellow. He does endorse decrease in appetite. Denies cp, palpitations, sob, abd pain, N/V, fever, chills.   Of note, pt is scheduled to see his oncologist, Dr. Banks, in 2 days to follow up on his CT scan he recently had done to assess for recurrence of his cancer.  (09 Aug 2021 16:08)    PAST MEDICAL & SURGICAL HISTORY:  Hypertension    CVA (cerebral vascular accident)  2005 -- wife and patient state no residual    Hyperlipidemia    Osteoarthritis    Carotid artery occlusion  left side in November 2013  Left CEA 11/2013    Arthropathy  left hip &gt; right hip    Colon cancer  had surgery in March 2014 with mets to liver-- received chemotherapy    Liver cancer  METS from the colon -- has received chemotherapy    Depression  sts was depressed &quot;I lost my brother while going to this&quot;    Reflux    Clostridium difficile infection  on short term vancomyacin    Snoring  DREW precautions -- responds affirmatively to STOP BANG questionnaire -- admits to loud snoring; age &gt; 50; h/o htn; gender, male; h/o htn    Lumbar stenosis    DJD (degenerative joint disease)    Atherosclerosis    Cataract of left eye    Lung metastasis    S/P carotid endarterectomy  11/2013  left side    H/O skin graft  1984    Colon cancer  diagnosed in March 2014 with subsequent colon cancer followed with chemotherapy    [facility] with [facility]S to liver    H/O resection of liver  2015      HEALTH ISSUES - PROBLEM Dx:  Jaundice  Jaundice    Adenocarcinoma of colon metastatic to liver  Adenocarcinoma of colon metastatic to liver    Hyperbilirubinemia  Hyperbilirubinemia    Essential hypertension  Essential hypertension    Prophylactic measure  Prophylactic measure    ACP (advance care planning)  ACP (advance care planning)    Hyperkalemia  Hyperkalemia    Diabetes    Acute CHF    Acute kidney failure    Lactic acidosis    Liver failure          MEDICATIONS  (STANDING):  albumin human 25% IVPB 50 milliLiter(s) IV Intermittent every 6 hours  chlorhexidine 4% Liquid 1 Application(s) Topical <User Schedule>  midodrine. 5 milliGRAM(s) Oral three times a day  octreotide  Injectable 100 MICROGram(s) SubCutaneous every 8 hours    MEDICATIONS  (PRN):  ondansetron Injectable 4 milliGRAM(s) IV Push every 8 hours PRN Nausea and/or Vomiting  sodium chloride 0.9% lock flush 10 milliLiter(s) IV Push every 1 hour PRN Pre/post blood products, medications, blood draw, and to maintain line patency    Allergies    No Known Allergies    Intolerances        INTERVAL HPI/OVERNIGHT EVENTS:  Patient S&E at bedside. No o/n events,     VITAL SIGNS:  T(F): 97.7 (08-12-21 @ 04:00)  HR: 100 (08-12-21 @ 07:00)  BP: 106/39 (08-12-21 @ 07:00)  RR: 29 (08-12-21 @ 07:00)  SpO2: 93% (08-12-21 @ 07:00)  Wt(kg): --    PHYSICAL EXAM:  Constitutional: NAD  Eyes: sclera non-icteric  Neck: no JVD  Respiratory: CTA b/l, good air entry b/l ant.  Cardiovascular: RRR, no M/R/G  Gastrointestinal: soft, NTND, no masses palpable, no hepatosplenomegaly  Extremities: no calf tenderness  Neurological: Awake, alert.    Labs:             8.2    22.59 )-----------( 93       ( 08-12 @ 06:50 )             25.0                10.8   15.07 )-----------( 147      ( 08-11 @ 05:30 )             33.2                10.9   18.67 )-----------( 298      ( 08-10 @ 05:50 )             36.8                10.3   12.23 )-----------( 241      ( 08-09 @ 11:20 )             33.2       08-12    137  |  93<L>  |  36<H>  ----------------------------<  x   3.9   |  22  |  2.81<H>    Ca    7.2<L>      12 Aug 2021 06:25  Phos  5.6     08-12  Mg     2.0     08-12    TPro  4.5<L>  /  Alb  2.2<L>  /  TBili  13.5<H>  /  DBili  x   /  AST  x   /  ALT  x   /  AlkPhos  484<H>  08-12    PT/INR - ( 11 Aug 2021 05:30 )   PT: 33.0 sec;   INR: 2.87 ratio         PTT - ( 11 Aug 2021 05:30 )  PTT:31.1 sec    Consultant notes reviewed : YES [x ] ; NO [ ]  
MORALES MORILLO   71y   Male    Admitting: DENG Moreau  HPI:  70yo male with hx of Metastatic Colon Adenocarcinoma diagnosed 10years ago subsequently in remission, HTN, symptomatic COVID-19 infection in early 2021, who presented to the ED with complaints of jaundice associated with b/l LE swelling. Pt was noted on routine follow up to be significantly anemic a few weeks PTA, s/p PRBC transfusion. Since the transfusion, pt noted LE swelling and increased abdominal girth.  He denied any pain or discomfort. Denied cp, palpitations, sob, abd pain, N/V, fever, chills.   Of note, pt was scheduled to see his oncologist, Dr. Motley, this week to follow up on his CT scan he recently had done.    PAST MEDICAL & SURGICAL HISTORY:  Hypertension    CVA (cerebral vascular accident)  2005 -- wife and patient state no residual    Hyperlipidemia    Osteoarthritis    Carotid artery occlusion  left side in November 2013  Left CEA 11/2013    Arthropathy  left hip &gt; right hip    Colon cancer  had surgery in March 2014 with mets to liver-- received chemotherapy    Liver cancer  METS from the colon -- has received chemotherapy    Depression    Reflux    Clostridium difficile infection  on short term vancomyacin    Snoring  DREW precautions -- responds affirmatively to STOP BANG questionnaire -- admits to loud snoring; age &gt; 50; h/o htn; gender, male; h/o htn    Lumbar stenosis    DJD (degenerative joint disease)    Atherosclerosis    Cataract of left eye    Lung metastasis    S/P carotid endarterectomy  11/2013  left side    H/O skin graft  1984    Colon cancer  diagnosed in March 2014 with subsequent colon cancer followed with chemotherapy    Colon cancer  [facility] 2014 for chemotherapy due to colon cancer with METS to liver    H/O resection of liver  2015      HEALTH ISSUES - PROBLEM Dx:  Jaundice    Hyperbilirubinemia    Adenocarcinoma of colon metastatic to liver    Essential hypertension    Prophylactic measure    ACP (advance care planning)    Hyperkalemia    Diabetes    Acute CHF    Acute kidney failure    Lactic acidosis    Liver failure    MEDICATIONS  (STANDING):  chlorhexidine 4% Liquid 1 Application(s) Topical <User Schedule>  midodrine. 5 milliGRAM(s) Oral three times a day  sodium bicarbonate  Infusion 0.366 mEq/kG/Hr (150 mL/Hr) IV Continuous <Continuous>    MEDICATIONS  (PRN):  ondansetron Injectable 4 milliGRAM(s) IV Push every 8 hours PRN Nausea and/or Vomiting  sodium chloride 0.9% lock flush 10 milliLiter(s) IV Push every 1 hour PRN Pre/post blood products, medications, blood draw, and to maintain line patency    Allergies    No Known Allergies    INTERVAL HPI/OVERNIGHT EVENTS:  Patient S&E at bedside. Weak; denies pain.    VITAL SIGNS:  T(F): 97.2 (08-10-21 @ 23:00)  HR: 117 (08-11-21 @ 06:00)  BP: 114/38 (08-11-21 @ 06:00)  RR: 30 (08-11-21 @ 06:00)  SpO2: 94% (08-11-21 @ 06:00)    PHYSICAL EXAM:  Constitutional: NAD  Eyes: sclera icteric  Neck: no JVD  Respiratory: decreased BS bases ant.  Cardiovascular: RRR, no M/R/G  Gastrointestinal: soft, NT; +BS  Extremities: no calf tenderness; +edema  Neurological: Awake, alert.    Labs:             10.8   15.07 )-----------( 147      ( 08-11 @ 05:30 )             33.2                10.9   18.67 )-----------( 298      ( 08-10 @ 05:50 )             36.8                10.3   12.23 )-----------( 241      ( 08-09 @ 11:20 )             33.2       08-11    138  |  97  |  43<H>  ----------------------------<  86  4.0   |  29  |  2.14<H>    Ca    7.6<L>      11 Aug 2021 05:30  Phos  4.0     08-11  Mg     1.9     08-11    TPro  5.6<L>  /  Alb  2.3<L>  /  TBili  12.9<H>  /  DBili  x   /  AST  x   /  ALT  3479<H>  /  AlkPhos  817<H>  08-11    PT/INR - ( 11 Aug 2021 05:30 )   PT: 33.0 sec;   INR: 2.87 ratio         PTT - ( 11 Aug 2021 05:30 )  PTT:31.1 sec      RADIOLOGY & ADDITIONAL TESTS:  < from: Xray Chest 1 View-PORTABLE IMMEDIATE (Xray Chest 1 View-PORTABLE IMMEDIATE .) (08.10.21 @ 14:17) >  IMPRESSION: Right jugular line inserted. Improved rightbase atelectatic process.    < end of copied text >    Consultant notes reviewed : YES [x ] ; NO [ ]  
Patient is a 71y old  Male who presents with a chief complaint of B/L LE swelling (10 Aug 2021 08:39)    Patient seen and examined at bedside. Admit from yesterday. Pt has no complaints at time of evaluation. +tachypneic on eval  Hyperkalemia 6.6 with no improvement post hyperK protocol; repeat 7.2. Markedly elevated LFTs.  ICU consulted    ALLERGIES:  No Known Allergies    MEDICATIONS  (STANDING):  dextrose 50% Injectable 50 milliLiter(s) IV Push once  heparin   Injectable 5000 Unit(s) SubCutaneous every 8 hours  insulin regular  human recombinant 10 Unit(s) IV Push once    MEDICATIONS  (PRN):  ondansetron Injectable 4 milliGRAM(s) IV Push every 8 hours PRN Nausea and/or Vomiting    Vital Signs Last 24 Hrs  T(F): 97.5 (10 Aug 2021 05:25), Max: 98 (09 Aug 2021 16:27)  HR: 104 (10 Aug 2021 05:25) (79 - 104)  BP: 118/50 (10 Aug 2021 05:25) (111/71 - 146/71)  RR: 18 (10 Aug 2021 05:25) (14 - 18)  SpO2: 97% (10 Aug 2021 05:25) (96% - 98%)  I&O's Summary    BMI (kg/m2): 21.9 (21 @ 16:54)    PHYSICAL EXAM:  General: NAD, A/O x 2-3. Jaundiced. Moderate respiratory distress  ENT: MMM, no tonsilar exudate  Neck: Supple, No JVD  Lungs: Clear to auscultation bilaterally, no wheezes. Good air entry bilaterally   Cardio: RRR, S1/S2, +systolic murmur  Abdomen: Soft, Nontender, +distension; Bowel sounds present  Extremities: No calf tenderness, +1 b/l LE piting edema up to mid-thigh    LABS:                        10.9   18.67 )-----------( 298      ( 10 Aug 2021 05:50 )             36.8       08-10    132  |  97  |  66  ----------------------------<  309  7.3   |  <5  |  2.70    Ca    8.9      10 Aug 2021 08:50    TPro  6.2  /  Alb  2.3  /  TBili  11.2  /  DBili  x   /  AST  3023  /  ALT  845  /  AlkPhos  562  08-10     eGFR if Non African American: 23 mL/min/1.73M2 (08-10-21 @ 08:50)  eGFR if : 26 mL/min/1.73M2 (08-10-21 @ 08:50)    PT/INR - ( 09 Aug 2021 11:20 )   PT: 16.9 sec;   INR: 1.42 ratio      PTT - ( 09 Aug 2021 11:20 )  PTT:31.3 sec     POCT Blood Glucose.: 162 mg/dL (10 Aug 2021 09:02)  POCT Blood Glucose.: 50 mg/dL (10 Aug 2021 08:28)    Urinalysis Basic - ( 09 Aug 2021 13:05 )    Color: Montserrat / Appearance: Clear / S.020 / pH: x  Gluc: x / Ketone: Negative  / Bili: Moderate / Urobili: 8   Blood: x / Protein: 30 mg/dL / Nitrite: Negative   Leuk Esterase: Trace / RBC: 0-4 /HPF / WBC 0-2 /HPF   Sq Epi: x / Non Sq Epi: Neg.-Few / Bacteria: Trace /HPF    RADIOLOGY & ADDITIONAL TESTS:     Care Discussed with Consultants/Other Providers: d/w Dr. Fitzgerald. Dr. Yates

## 2021-08-12 NOTE — PROGRESS NOTE ADULT - PROVIDER SPECIALTY LIST ADULT
Critical Care
Critical Care
Nephrology
Critical Care
Critical Care
Nephrology
Palliative Care
Heme/Onc
Hospitalist
Palliative Care
Heme/Onc

## 2021-08-12 NOTE — PROVIDER CONTACT NOTE (CRITICAL VALUE NOTIFICATION) - SITUATION
patient s/p admission for liver ca with mets, esrd
Co2 <, Potassium 7.3
Critical lab notification
lab @ 2000 done.lactate level 10.1. previous lactate level 20.1
Lactate 21.6

## 2021-08-12 NOTE — PROGRESS NOTE ADULT - PROBLEM SELECTOR PLAN 1
Clinically secondary to advanced liver metastases.
Clinically secondary to advanced liver metastases. GI consultation noted-no plans for biliary stent.

## 2021-08-13 ENCOUNTER — NON-APPOINTMENT (OUTPATIENT)
Age: 71
End: 2021-08-13

## 2021-09-01 DIAGNOSIS — R73.09 OTHER ABNORMAL GLUCOSE: ICD-10-CM

## 2021-09-10 ENCOUNTER — APPOINTMENT (OUTPATIENT)
Dept: FAMILY MEDICINE | Facility: CLINIC | Age: 71
End: 2021-09-10

## 2021-12-20 NOTE — PHYSICAL THERAPY INITIAL EVALUATION ADULT - NAME OF DISCHARGE PLANNER
Patient sent message requesting to establish care with  . Spoke with , patient can be scheduled with her.     Unable to reach patient by phone, message left for patient to call and we will schedule appointment with Jami Stevenson
ALEXANDR Reyes

## 2022-03-07 NOTE — BRIEF OPERATIVE NOTE - PRIMARY SURGEON
Alexander Alysia Mahmood MD.    [address] [facility] [id number]  Dept: [phone]  Dept Fax: [phone]: [address] [facility] Urology Office Note -     Patient:  Altaf Rodriguez  YOB: 1951    The patient is a 69 y.o. male who presents today for evaluation of the following problems:   Chief Complaint   Patient presents with    New Patient     had a RALRP done by Dr. Breezy Low in 2017    referred/consultation requested by Maciej Griffith. History of Present Illness:    Prostate cancer  S/p prostatectomy with mosqueda in past  Margins were negative  Slight bump in PSA    ED  Not bothered    TAMELA  Resolved  No issues with this    Summary of Previous Records:  Altaf Rodriguez is a 78 y.o. male was seen in follow up for prostate cancer. He  underwent Robotic-Assisted Laparoscopic Radical Prostatectomy (RALRP) and bilateral pelvic lymph node dissection 10/6/2017. Final pathology resulted pT2cN0, margins negative, Gleasons 3+4, perineural invasion. He is doing well. He was noting gross hematuria for a day and this resolved. He states he is ambulating well, appetite is well, bowel movements occurring. Pain is minimal, catheter is more discomforting than anything. He also denies night sweats, fatigue, malaise, nausea, vomiting, chest pain, SOB, fever, chills. Requested/reviewed records from Maciej Griffith office and/or outside [de-identified]    (Patient's old records have been requested, reviewed and pertinent findings summarized in today's note.)    Procedures Today: N/A      Last several PSA's:  Lab Results   Component Value Date    PSA 0.07 02/15/2022    PSA 0.04 08/19/2021    PSA 0.04 03/03/2021       Last total testosterone:  No results found for: TESTOSTERONE    Urinalysis today:  No results found for this visit on 03/07/22.     Last BUN and creatinine:  Lab Results   Component Value Date    BUN 16 02/15/2022     Lab Results   Component Value Date    CREATININE 1.5 02/15/2022         Imaging Reviewed during this Office Visit:   Jkaob Ramirez MD independently reviewed the images and verified the radiology reports from:    No results found. PAST MEDICAL, FAMILY AND SOCIAL HISTORY:  Past Medical History:   Diagnosis Date    Anxiety     Arthritis     Cancer ([facility] [address] 2017    prostate cancer 8-2017    COPD exacerbation ([facility] [address] 7/5/2020    Elevated PSA     Gout     History of heart artery stent 03/14/2019    LAD with Dr. Pretty Rivero at [facility] Hypertension      Past Surgical History:   Procedure Laterality Date    APPENDECTOMY  1970's    COLONOSCOPY  2009    OTHER SURGICAL HISTORY  2013    mole removal    PROSTATECTOMY N/A 10/6/2017    ROBOTIC RADICAL PROSTATECTOMY, BILATERAL PELVIC LYMPH NODE DISSECTION performed by Laurel Gonzalez MD at [address]     No family history on file.   Outpatient Medications Marked as Taking for the 3/7/22 encounter (Office Visit) with Mc Bradley MD   Medication Sig Dispense Refill    fluticasone (FLONASE) 50 MCG/ACT nasal spray 2 sprays by Each Nostril route daily 16 g 2    albuterol sulfate HFA (PROVENTIL HFA) 108 (90 Base) MCG/ACT inhaler Inhale 2 puffs into the lungs every 6 hours as needed for Wheezing 1 Inhaler 3    omeprazole (PRILOSEC) 20 MG delayed release capsule Take 1 capsule by mouth Daily 90 capsule 3    umeclidinium-vilanterol (ANORO ELLIPTA) 62.5-25 MCG/INH AEPB inhaler Inhale 1 puff into the lungs daily 30 puff 11    ZINC PO Take by mouth      NONFORMULARY Indications: joint lax       ondansetron (ZOFRAN ODT) 4 MG disintegrating tablet Take 1 tablet by mouth 4 times daily as needed for Nausea or Vomiting 20 tablet 0    aspirin 81 MG EC tablet Take 1 tablet by mouth nightly 30 tablet 1    allopurinol (ZYLOPRIM) 300 MG tablet Take 300 mg by mouth daily      metoprolol succinate (TOPROL XL) 25 MG extended release tablet orders of the defined types were placed in this encounter. Orders Placed:  No orders of the defined types were placed in this encounter.            Keke Page MD

## 2024-02-21 NOTE — PROGRESS NOTE ADULT - ASSESSMENT
Physical Examination:  GENERAL:               Alert, Oriented, No acute distress.    HEENT:                    No JVD, Moist MM  PULM:                     Bilateral air entry, diminishedto auscultation bilaterally, no significant sputum production, No Rales, No Rhonchi, No Wheezing  CVS:                         S1, S2,  +Murmur  ABD:                        Soft, nondistended, nontender, normoactive bowel sounds,   EXT:                         No edema, nontender, No Cyanosis mild Clubbing   NEURO:                  Alert, oriented, interactive, nonfocal, follows commands  PSYC:                      Calm, + Insight.      Assessment  1. COVID PNA  2. 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 h/o lung nodule  5. Ex smoker, underlying undiagnosed copd  6. h/o Colon cancer with mets to liver and lung    Plan  n/c o2 to maintain sat - try oxymizer  re does decadron x 10 days, may help with inflammatory process  check COVID biomarkers in am  continue symbicort    incentive spirometry  check CTA chest to r/o PE, evaluate copd, ILD from covid  d/w Dr. Willis   Suspect pt will need to go home on home o2.    "The patient is Stable - Low risk of patient condition declining or worsening    Shift Goals  Clinical Goals: Pt will remain free from falls and injury during shift.  Patient Goals: \"Home\"  Family Goals: KATIANA    Progress made toward(s) clinical / shift goals:  Remained free from injury during shift. Pt was given pain medication earlier in shift for physical signs of pain. He appeared to relax afterwards. Pt continues to be confused and have anxiety. He requires frequent redirection and reassurance. He is receiving insulin for elevated blood sugars. He requires 1:1 supervision and assistance during meals. He is unable to meet ADLs without staff assistance.    Brother called unit to state he wanted pt to go on hospice, this information was relayed to .      Problem: Pain - Standard  Goal: Alleviation of pain or a reduction in pain to the patient’s comfort goal  Outcome: Progressing          NOTE* Sepsis protocol ordered due to pop up screen alert.    "
